# Patient Record
Sex: FEMALE | Race: BLACK OR AFRICAN AMERICAN | Employment: OTHER | URBAN - METROPOLITAN AREA
[De-identification: names, ages, dates, MRNs, and addresses within clinical notes are randomized per-mention and may not be internally consistent; named-entity substitution may affect disease eponyms.]

---

## 2018-05-14 ENCOUNTER — OFFICE VISIT (OUTPATIENT)
Dept: NEUROLOGY | Age: 38
End: 2018-05-14

## 2018-05-14 VITALS
WEIGHT: 236.3 LBS | OXYGEN SATURATION: 97 % | TEMPERATURE: 98.4 F | DIASTOLIC BLOOD PRESSURE: 60 MMHG | HEART RATE: 98 BPM | RESPIRATION RATE: 18 BRPM | BODY MASS INDEX: 39.37 KG/M2 | HEIGHT: 65 IN | SYSTOLIC BLOOD PRESSURE: 104 MMHG

## 2018-05-14 DIAGNOSIS — M54.2 CERVICALGIA: ICD-10-CM

## 2018-05-14 DIAGNOSIS — G43.009 MIGRAINE WITHOUT AURA AND WITHOUT STATUS MIGRAINOSUS, NOT INTRACTABLE: Primary | ICD-10-CM

## 2018-05-14 RX ORDER — TOPIRAMATE 25 MG/1
TABLET ORAL
Qty: 30 TAB | Refills: 6 | Status: SHIPPED | OUTPATIENT
Start: 2018-05-14 | End: 2018-08-31

## 2018-05-14 RX ORDER — METHYLPREDNISOLONE 4 MG/1
TABLET ORAL
Qty: 1 DOSE PACK | Refills: 0 | Status: SHIPPED | OUTPATIENT
Start: 2018-05-14 | End: 2018-05-14 | Stop reason: SDUPTHER

## 2018-05-14 RX ORDER — TOPIRAMATE 25 MG/1
TABLET ORAL
Qty: 30 TAB | Refills: 6 | Status: SHIPPED | OUTPATIENT
Start: 2018-05-14 | End: 2018-05-14 | Stop reason: SDUPTHER

## 2018-05-14 RX ORDER — SUMATRIPTAN 100 MG/1
100 TABLET, FILM COATED ORAL
Qty: 12 TAB | Refills: 6 | Status: SHIPPED | OUTPATIENT
Start: 2018-05-14 | End: 2018-08-31 | Stop reason: SDUPTHER

## 2018-05-14 RX ORDER — METHYLPREDNISOLONE 4 MG/1
TABLET ORAL
Qty: 1 DOSE PACK | Refills: 0 | Status: SHIPPED | OUTPATIENT
Start: 2018-05-14 | End: 2018-05-31

## 2018-05-14 RX ORDER — SUMATRIPTAN 100 MG/1
100 TABLET, FILM COATED ORAL
Qty: 12 TAB | Refills: 6 | Status: SHIPPED | OUTPATIENT
Start: 2018-05-14 | End: 2018-05-14 | Stop reason: SDUPTHER

## 2018-05-14 RX ORDER — CYCLOBENZAPRINE HCL 10 MG
TABLET ORAL
COMMUNITY
End: 2018-08-31

## 2018-05-14 RX ORDER — LISINOPRIL AND HYDROCHLOROTHIAZIDE 10; 12.5 MG/1; MG/1
TABLET ORAL DAILY
COMMUNITY
End: 2019-11-25 | Stop reason: SDUPTHER

## 2018-05-14 RX ORDER — METHOCARBAMOL 500 MG/1
TABLET, FILM COATED ORAL AS NEEDED
COMMUNITY
End: 2018-10-24 | Stop reason: SDUPTHER

## 2018-05-14 NOTE — PROGRESS NOTES
Migraine headaches. Neurology Consult      Subjective:      Rishi Green is a 42 y.o. female who comes in with an interesting pain picture. Says she has had migraines since college. Is I understand it ended up seeing Dr. 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 Apparently there was extensive testing done although I do not have the benefit of that. She ended up having childbirth around the time and got out of the routine follow-up with Dr. Sam Martini. Headaches are always right-sided with a very strong periorbital supraorbital location. Can get a random 3-5 headaches per week and since April has been more intractable than not. There can be nausea and a pressure-like feeling. Interestingly has a right neck strain component and this can be present for several days. Has used Fioricet and recent suggestions to start Topamax given the evolution of recent headaches. Headaches enhance by light noise and smells and diminished by lying down. Family history negative for headaches. Stress includes 3 kids and when out on what sounds like medical leave in recent times with low back pain as well. Is on CPAP for obstructive sleep apnea. Saw a chiropractor who did imaging of her neck and said that there was straightening of her neck and a scoliotic pattern to her lower back? He had some proposals for her in terms of treatment but she is thinking them over. Is very familiar with gabapentin which she has used for low back discomfort and I see Robaxin and Flexeril and hydrocodone in her medical tab. As far as her neck goes I hear nothing today that promotes a radiculopathy agenda. Current Outpatient Prescriptions   Medication Sig Dispense Refill    butalb/acetaminophen/caffeine (FIORICET PO) Take 2 Tabs by mouth every four (4) hours as needed.  methocarbamol (ROBAXIN) 500 mg tablet Take  by mouth four (4) times daily.       lisinopril-hydroCHLOROthiazide (Coye Louis) 10-12.5 mg per tablet Take  by mouth daily.  cyclobenzaprine (FLEXERIL) 10 mg tablet Take  by mouth three (3) times daily as needed for Muscle Spasm(s).  levonorgestrel (MIRENA) 20 mcg/24 hr (5 years) IUD 1 Device by IntraUTERine route once.  methylPREDNISolone (MEDROL DOSEPACK) 4 mg tablet Per package instructions. .. . 1 Dose Pack 0    SUMAtriptan (IMITREX) 100 mg tablet Take 1 Tab by mouth once as needed for Migraine for up to 1 dose. Indications: Migraine 12 Tab 6    topiramate (TOPAMAX) 25 mg tablet 1 po qd  Indications: MIGRAINE PREVENTION 30 Tab 6    HYDROcodone-acetaminophen (NORCO) 5-325 mg per tablet Take 2 Tabs by mouth every six (6) hours as needed. 30 Tab 0      No Known Allergies  Past Medical History:   Diagnosis Date    Anxiety     Essential hypertension     chronic pt takes labetolol    Headache     Musculoskeletal disorder     Scoliosis     Snoring       Past Surgical History:   Procedure Laterality Date     DELIVERY ONLY      HX CARPAL TUNNEL RELEASE      HX GYN      HX ORTHOPAEDIC      HX OTHER SURGICAL      left knee surgery 2007    NEUROLOGICAL PROCEDURE UNLISTED        Social History     Social History    Marital status:      Spouse name: N/A    Number of children: N/A    Years of education: N/A     Occupational History    Not on file.      Social History Main Topics    Smoking status: Never Smoker    Smokeless tobacco: Never Used    Alcohol use Yes      Comment: wine    Drug use: No    Sexual activity: Yes     Partners: Male     Other Topics Concern    Not on file     Social History Narrative      Family History   Problem Relation Age of Onset    Diabetes Mother     Hypertension Mother     Cancer Mother     Neuropathy Mother     Hypertension Father     Cancer Father     Cancer Maternal Grandfather     Stroke Maternal Grandfather     Diabetes Paternal Grandmother     Stroke Paternal Grandmother     Heart Disease Paternal Grandmother     Heart Disease Paternal Uncle       Visit Vitals    /60  Comment: supine    Pulse 98    Temp 98.4 °F (36.9 °C) (Oral)    Resp 18    Ht 5' 5\" (1.651 m)    Wt 107.2 kg (236 lb 4.8 oz)    LMP  (LMP Unknown)    SpO2 97%    Breastfeeding No    BMI 39.32 kg/m2        Review of Systems:   A comprehensive review of systems was negative except for that written in the HPI. Neuro Exam:     Appearance: The patient is well developed, well nourished, provides a coherent history and is in no acute distress. Mental Status: Oriented to time, place and person. Mood and affect appropriate. Cranial Nerves:   Intact visual fields. Fundi are benign. JOEY, EOM's full, no nystagmus, no ptosis. Facial sensation is normal. Corneal reflexes are intact. Facial movement is symmetric. Hearing is normal bilaterally. Palate is midline with normal sternocleidomastoid and trapezius muscles are normal. Tongue is midline. Motor:  5/5 strength in upper and lower proximal and distal muscles. Normal bulk and tone. No fasciculations. Reflexes:   Deep tendon reflexes 2+/4 and symmetrical.   Sensory:   Normal to touch, pinprick and vibration. Gait:  Normal gait. Tremor:   No tremor noted. Cerebellar:  No cerebellar signs present. Neurovascular:  Normal heart sounds and regular rhythm, peripheral pulses intact, and no carotid bruits. Neck range of motion actually looks incredibly decent but is slow to accomplish. Assessment:   Migraine headaches. Would like to intervene with Medrol Dosepak as an acute rescue attempt and beyond that Imitrex as a regular go to medicine which could be used with over-the-counter remedies if and as needed for a better combination effect. Topamax will be a preventative drug 25 mg nightly and needs to be patient as it builds up in her system. She will have to decide if she is going to follow the chiropractors advice as to her neck symptomatology.   Gabapentin may prove helpful with the neck pain component as well as the Medrol Dosepak. Revisit 2 months. Plan:   Revisit 2 months.   Signed by :  Victorina Vasques MD

## 2018-05-14 NOTE — MR AVS SNAPSHOT
[facility] 
 
 
 [facility] [address] [place] [address] [phone] Patient: Carmen Varela MRN: [id number]:[id number] Visit Information Date & Time Provider Department Dept. Phone Encounter #  
 5/14/2018  2:00 PM Ab Joshua MD [facility] Neurology [facility] [phone] [id number] Follow-up Instructions Return in about 2 months (around 7/14/2018). Upcoming Health Maintenance Date Due DTaP/Tdap/Td series (1 - Tdap) 5/16/1999 PAP AKA CERVICAL CYTOLOGY 5/16/1999 Influenza Age 5 to Adult 8/1/2018 Allergies as of 5/14/2018  Review Complete On: 5/14/2018 By: Ab Joshua MD  
 No Known Allergies Current Immunizations  Never Reviewed No immunizations on file. Not reviewed this visit You Were Diagnosed With   
  
 Codes Comments Migraine without aura and without status migrainosus, not intractable    -  Primary ICD-10-CM: G43.009 ICD-9-CM: 346.10 Cervicalgia     ICD-10-CM: M54.2 ICD-9-CM: 723.1 Vitals BP Pulse Temp Resp Height(growth percentile) Weight(growth percentile) 104/60 98 98.4 °F (36.9 °C) (Oral) 18 5' 5\" (1.651 m) 236 lb 4.8 oz (107.2 kg) LMP SpO2 Breastfeeding? BMI OB Status Smoking Status (LMP Unknown) 97% No 39.32 kg/m2 IUD Never Smoker Vitals History BMI and BSA Data Body Mass Index Body Surface Area  
 39.32 kg/m 2 2.22 m 2 Your Updated Medication List  
  
   
This list is accurate as of 5/14/18  2:57 PM.  Always use your most recent med list.  
  
  
  
  
 cyclobenzaprine 10 mg tablet Commonly known as:  FLEXERIL Take  by mouth three (3) times daily as needed for Muscle Spasm(s). FIORICET PO Take 2 Tabs by mouth every four (4) hours as needed. HYDROcodone-acetaminophen 5-325 mg per tablet Commonly known as:  Fredrica Hassan Take 2 Tabs by mouth every six (6) hours as needed. lisinopril-hydroCHLOROthiazide 10-12.5 mg per tablet Commonly known as:  Melissa Dun Take  by mouth daily. methylPREDNISolone 4 mg tablet Commonly known as:  Etka Pimple Per package instructions. ... MIRENA 20 mcg/24 hr (5 years) Iud  
Generic drug:  levonorgestrel 1 Device by IntraUTERine route once. ROBAXIN 500 mg tablet Generic drug:  methocarbamol Take  by mouth four (4) times daily. SUMAtriptan 100 mg tablet Commonly known as:  IMITREX Take 1 Tab by mouth once as needed for Migraine for up to 1 dose. Indications: Migraine  
  
 topiramate 25 mg tablet Commonly known as:  TOPAMAX 1 po qd  Indications: MIGRAINE PREVENTION Prescriptions Printed Refills  
 methylPREDNISolone (MEDROL DOSEPACK) 4 mg tablet 0 Sig: Per package instructions. ... Class: Print SUMAtriptan (IMITREX) 100 mg tablet 6 Sig: Take 1 Tab by mouth once as needed for Migraine for up to 1 dose. Indications: Migraine Class: Print Route: Oral  
 topiramate (TOPAMAX) 25 mg tablet 6 Si po qd  Indications: MIGRAINE PREVENTION Class: Print Follow-up Instructions Return in about 2 months (around 2018). Patient Instructions Information Regarding Testing and Medications If you have physican order for a test or a medication denied by your insurance company, this does not mean the test or medication is not appropriate for you as that is a medical decision, not a decision to be made by an insurance company representative or by an [facility] physician who has not interviewed and examined you. This is a decision to be made between you and your physician. The denial of services is a contractual matter between you and your insurance company, not an issue between your physician and the insurance company. If your test or medication is denied, you can take the following steps to help resolve the issue: 1.  File a complaint with the [facility] of [facility] regarding your insurance company's denial of services ordered for you. You can do this either by calling them directly or by completing an on-line complaint form on the makerist. This can be found at [email or web address] 2. Also file a formal complaint with your insurance company and ask to have the name of the person denying the service so that you may explore a legal option should you be harmed by this denial of service. Again, the fact the insurance company will not pay for the service does not mean it is not medically necessary and I would encourage you to follow through with the plan that was made with your physician 3. File a written complaint with your employer so your employer and benefit manager is aware of the poor coverage they are providing their employees. If you have medicare/medicaid, complain to your representative in the House and to your Alida Stokes. [place] [address] What is a living will? A living will is a legal form you use to write down the kind of care you want at the end of your life. It is used by the health professionals who will treat you if you aren't able to decide for yourself. If you put your wishes in writing, your loved ones and others will know what kind of care you want. They won't need to guess. This can ease your mind and be helpful to others. A living will is not the same as an estate or property will. An estate will explains what you want to happen with your money and property after you die. Is a living will a legal document? A living will is a legal document. Each state has its own laws about living urrutia. If you move to another state, make sure that your living will is legal in the state where you now live. Or you might use a universal form that has been approved by many states.  This kind of form can sometimes be completed and stored online. Your electronic copy will then be available wherever you have a connection to the Internet. In most cases, doctors will respect your wishes even if you have a form from a different state. · You don't need an  to complete a living will. But legal advice can be helpful if your state's laws are unclear, your health history is complicated, or your family can't agree on what should be in your living will. · You can change your living will at any time. Some people find that their wishes about end-of-life care change as their health changes. · In addition to making a living will, think about completing a medical power of  form. This form lets you name the person you want to make end-of-life treatment decisions for you (your \"health care agent\") if you're not able to. Many hospitals and nursing homes will give you the forms you need to complete a living will and a medical power of . · Your living will is used only if you can't make or communicate decisions for yourself anymore. If you become able to make decisions again, you can accept or refuse any treatment, no matter what you wrote in your living will. · Your state may offer an online registry. This is a place where you can store your living will online so the doctors and nurses who need to treat you can find it right away. What should you think about when creating a living will? Talk about your end-of-life wishes with your family members and your doctor. Let them know what you want. That way the people making decisions for you won't be surprised by your choices. Think about these questions as you make your living will: · Do you know enough about life support methods that might be used? If not, talk to your doctor so you know what might be done if you can't breathe on your own, your heart stops, or you're unable to swallow.  
· What things would you still want to be able to do after you receive life-support methods? Would you want to be able to walk? To speak? To eat on your own? To live without the help of machines? · If you have a choice, where do you want to be cared for? In your home? At a hospital or nursing home? · Do you want certain Nondenominational practices performed if you become very ill? · If you have a choice at the end of your life, where would you prefer to die? At home? In a hospital or nursing home? Somewhere else? · Would you prefer to be buried or cremated? · Do you want your organs to be donated after you die? What should you do with your living will? · Make sure that your family members and your health care agent have copies of your living will. · Give your doctor a copy of your living will to keep in your medical record. If you have more than one doctor, make sure that each one has a copy. · You may want to put a copy of your living will where it can be easily found. Where can you learn more? Go to http://sandhya-leroy.info/. Enter X976 in the search box to learn more about \"Learning About Living Ledy Kingsley. \" Current as of: September 24, 2016 Content Version: 11.4 © 2440-7614 [employer]. Care instructions adapted under license by [employer] (which disclaims liability or warranty for this information). If you have questions about a medical condition or this instruction, always ask your healthcare professional. [place] [address] any warranty or liability for your use of this information. Patient history reviewed and patient examined. Would like to start Topamax as a migraine preventative agent and would like to insert Medrol Dosepak as a rescue for inflammation of head and neck. Imitrex will be a more standard rescue and she will need to decide on the plan of action with the chiropractor. Gabapentin at home could will help the neck pain component as well as headaches. Introducing [facility]! Jose Alejandro Avelar introduces Events Core patient portal. Now you can access parts of your medical record, email your doctor's office, and request medication refills online. 1. In your internet browser, go to [email or web address] 2. Click on the First Time User? Click Here link in the Sign In box. You will see the New Member Sign Up page. 3. Enter your Events Core Access Code exactly as it appears below. You will not need to use this code after youve completed the sign-up process. If you do not sign up before the expiration date, you must request a new code. · Events Core Access Code: [email or web address] Expires: 8/12/2018  2:57 PM 
 
4. Enter the last four digits of your Social Security Number (xxxx) and Date of Birth (mm/dd/yyyy) as indicated and click Submit. You will be taken to the next sign-up page. 5. Create a Events Core ID. This will be your Events Core login ID and cannot be changed, so think of one that is secure and easy to remember. 6. Create a Events Core password. You can change your password at any time. 7. Enter your Password Reset Question and Answer. This can be used at a later time if you forget your password. 8. Enter your e-mail address. You will receive e-mail notification when new information is available in [address] 9. Click Sign Up. You can now view and download portions of your medical record. 10. Click the Download Summary menu link to download a portable copy of your medical information. If you have questions, please visit the Frequently Asked Questions section of the Events Core website. Remember, Events Core is NOT to be used for urgent needs. For medical emergencies, dial 911. Now available from your iPhone and Android! Please provide this summary of care documentation to your next provider. Your primary care clinician is listed as Any Vo. If you have any questions after today's visit, please call [phone].

## 2018-05-14 NOTE — PATIENT INSTRUCTIONS
Information Regarding Testing and Medications    If you have physican order for a test or a medication denied by your insurance company, this does not mean the test or medication is not appropriate for you as that is a medical decision, not a decision to be made by an insurance company representative or by an [facility] physician who has not interviewed and examined you. This is a decision to be made between you and your physician. The denial of services is a contractual matter between you and your insurance company, not an issue between your physician and the insurance company. If your test or medication is denied, you can take the following steps to help resolve the issue:    1. File a complaint with the [facility] regarding your insurance company's denial of services ordered for you. You can do this either by calling them directly or by completing an on-line complaint form on the Trifacta. This can be found at [email or web address]    2. Also file a formal complaint with your insurance company and ask to have the name of the person denying the service so that you may explore a legal option should you be harmed by this denial of service. Again, the fact the insurance company will not pay for the service does not mean it is not medically necessary and I would encourage you to follow through with the plan that was made with your physician    3. File a written complaint with your employer so your employer and benefit manager is aware of the poor coverage they are providing their employees. If you have medicare/medicaid, complain to your representative in the House and to your Alida Stokes. Learning About Living Jenny Rudolph  What is a living will? A living will is a legal form you use to write down the kind of care you want at the end of your life.  It is used by the health professionals who will treat you if you aren't able to decide for yourself. If you put your wishes in writing, your loved ones and others will know what kind of care you want. They won't need to guess. This can ease your mind and be helpful to others. A living will is not the same as an estate or property will. An estate will explains what you want to happen with your money and property after you die. Is a living will a legal document? A living will is a legal document. Each state has its own laws about living urrutia. If you move to another state, make sure that your living will is legal in the state where you now live. Or you might use a universal form that has been approved by many states. This kind of form can sometimes be completed and stored online. Your electronic copy will then be available wherever you have a connection to the Internet. In most cases, doctors will respect your wishes even if you have a form from a different state. · You don't need an  to complete a living will. But legal advice can be helpful if your state's laws are unclear, your health history is complicated, or your family can't agree on what should be in your living will. · You can change your living will at any time. Some people find that their wishes about end-of-life care change as their health changes. · In addition to making a living will, think about completing a medical power of  form. This form lets you name the person you want to make end-of-life treatment decisions for you (your \"health care agent\") if you're not able to. Many hospitals and nursing homes will give you the forms you need to complete a living will and a medical power of . · Your living will is used only if you can't make or communicate decisions for yourself anymore. If you become able to make decisions again, you can accept or refuse any treatment, no matter what you wrote in your living will. · Your state may offer an online registry.  This is a place where you can store your living will online so the doctors and nurses who need to treat you can find it right away. What should you think about when creating a living will? Talk about your end-of-life wishes with your family members and your doctor. Let them know what you want. That way the people making decisions for you won't be surprised by your choices. Think about these questions as you make your living will:  · Do you know enough about life support methods that might be used? If not, talk to your doctor so you know what might be done if you can't breathe on your own, your heart stops, or you're unable to swallow. · What things would you still want to be able to do after you receive life-support methods? Would you want to be able to walk? To speak? To eat on your own? To live without the help of machines? · If you have a choice, where do you want to be cared for? In your home? At a hospital or nursing home? · Do you want certain Mandaen practices performed if you become very ill? · If you have a choice at the end of your life, where would you prefer to die? At home? In a hospital or nursing home? Somewhere else? · Would you prefer to be buried or cremated? · Do you want your organs to be donated after you die? What should you do with your living will? · Make sure that your family members and your health care agent have copies of your living will. · Give your doctor a copy of your living will to keep in your medical record. If you have more than one doctor, make sure that each one has a copy. · You may want to put a copy of your living will where it can be easily found. Where can you learn more? Go to http://sandhya-leroy.info/. Enter W652 in the search box to learn more about \"Learning About Living Perroy. \"  Current as of: September 24, 2016  Content Version: 11.4  © 6305-5230 Healthwise, Incorporated.  Care instructions adapted under license by [employer] (which disclaims liability or warranty for this information). If you have questions about a medical condition or this instruction, always ask your healthcare professional. [address]  any warranty or liability for your use of this information. Patient history reviewed and patient examined. Would like to start Topamax as a migraine preventative agent and would like to insert Medrol Dosepak as a rescue for inflammation of head and neck. Imitrex will be a more standard rescue and she will need to decide on the plan of action with the chiropractor. Gabapentin at home could will help the neck pain component as well as headaches.

## 2018-05-29 ENCOUNTER — TELEPHONE (OUTPATIENT)
Dept: NEUROLOGY | Age: 38
End: 2018-05-29

## 2018-05-29 NOTE — TELEPHONE ENCOUNTER
----- Message from Zackery Belcher sent at 5/29/2018 10:43 AM EDT -----  Regarding: Dr. Wilner White Pt requested a call from the nurse regarding chronic pain in lower back and would like to know  the diagnostic process for MS or any other movement disorders. Best contact number [phone].

## 2018-05-31 ENCOUNTER — OFFICE VISIT (OUTPATIENT)
Dept: NEUROLOGY | Age: 38
End: 2018-05-31

## 2018-05-31 ENCOUNTER — TELEPHONE (OUTPATIENT)
Dept: NEUROLOGY | Age: 38
End: 2018-05-31

## 2018-05-31 VITALS
DIASTOLIC BLOOD PRESSURE: 84 MMHG | HEIGHT: 65 IN | WEIGHT: 236 LBS | BODY MASS INDEX: 39.32 KG/M2 | SYSTOLIC BLOOD PRESSURE: 122 MMHG

## 2018-05-31 DIAGNOSIS — M79.605 PAIN IN BOTH LOWER EXTREMITIES: ICD-10-CM

## 2018-05-31 DIAGNOSIS — M54.41 CHRONIC BILATERAL LOW BACK PAIN WITH BILATERAL SCIATICA: ICD-10-CM

## 2018-05-31 DIAGNOSIS — G89.29 CHRONIC BILATERAL LOW BACK PAIN WITH BILATERAL SCIATICA: ICD-10-CM

## 2018-05-31 DIAGNOSIS — M54.42 CHRONIC BILATERAL LOW BACK PAIN WITH BILATERAL SCIATICA: ICD-10-CM

## 2018-05-31 DIAGNOSIS — M79.604 PAIN IN BOTH LOWER EXTREMITIES: ICD-10-CM

## 2018-05-31 DIAGNOSIS — G43.909 MIGRAINE WITHOUT STATUS MIGRAINOSUS, NOT INTRACTABLE, UNSPECIFIED MIGRAINE TYPE: Primary | ICD-10-CM

## 2018-05-31 PROBLEM — E66.01 SEVERE OBESITY (BMI 35.0-39.9): Status: ACTIVE | Noted: 2018-05-31

## 2018-05-31 NOTE — TELEPHONE ENCOUNTER
----- Message from Domitila Leung sent at 5/31/2018  2:57 PM EDT -----  Regarding: NP Marycruz/Telephone  Pt requesting to speak with the nurse in regards to medication \"Gabapentin\" 300 MG. Pt inquiring on how many times should she be taking medication a day. Pt stated, she knows medication is to be taken as needed for pain inquiring on the maximin amount she could take per day. Best contact number [phone].

## 2018-05-31 NOTE — PATIENT INSTRUCTIONS
[facility]   Statement to Patients  April 1, 2014      In an effort to ensure the large volume of patient prescription refills is processed in the most efficient and expeditious manner, we are asking our patients to assist us by calling your Pharmacy for all prescription refills, this will include also your  Mail Order Pharmacy. The pharmacy will contact our office electronically to continue the refill process. Please do not wait until the last minute to call your pharmacy. We need at least 48 hours (2days) to fill prescriptions. We also encourage you to call your pharmacy before going to  your prescription to make sure it is ready. With regard to controlled substance prescription refill requests (narcotic refills) that need to be picked up at our office, we ask your cooperation by providing us with at least 72 hours (3days) notice that you will need a refill. We will not refill narcotic prescription refill requests after 4:00pm on any weekday, Monday through Thursday, or after 2:00pm on Fridays, or on the weekends. We encourage everyone to explore another way of getting your prescription refill request processed using [employer], our patient web portal through our electronic medical record system. [employer] is an efficient and effective way to communicate your medication request directly to the office and  downloadable as an sigrid on your smart phone . [employer] also features a review functionality that allows you to view your medication list as well as leave messages for your physician. Are you ready to get connected? If so please review the attatched instructions or speak to any of our staff to get you set up right away! Thank you so much for your cooperation. Should you have any questions please contact our Practice Administrator.     The Physicians and Staff,  [facility]

## 2018-05-31 NOTE — MR AVS SNAPSHOT
[id number] [facility] [address] 
[phone] Patient: Kwame Lucas MRN: [id number] Visit Information Date & Time Provider Department Dept. Phone Encounter #  
 5/31/2018  9:00 AM Michael Chapman  [facility] [phone] [id number] Follow-up Instructions Return after testing. Your Appointments 6/6/2018  9:00 AM  
PROCEDURE with EMG SCHEDULE [facility] Appt Note: EMG bilateral lower extr leathw [place] [address] [facility] [address] [facility] 99 [id number] [phone]  
  
   
 [place] [address]  
  
    
 6/21/2018 10:40 AM  
Follow Up with Jose Spencer MD  
[address] [facility] Appt Note: EMG, MRI results leathw  
 [address] [facility] [id number] [place] [address]  
[facility] [address] [place] [address] Upcoming Health Maintenance Date Due DTaP/Tdap/Td series (1 - Tdap) 5/16/1999 PAP AKA CERVICAL CYTOLOGY 5/16/1999 Influenza Age 5 to Adult 8/1/2018 Allergies as of 5/31/2018  Review Complete On: 5/31/2018 By: Albaro Cerda No Known Allergies Current Immunizations  Never Reviewed No immunizations on file. Not reviewed this visit You Were Diagnosed With   
  
 Codes Comments Migraine without status migrainosus, not intractable, unspecified migraine type    -  Primary ICD-10-CM: O02.380 ICD-9-CM: 346.90 Chronic bilateral low back pain with bilateral sciatica     ICD-10-CM: M54.42, M54.41, G89.29 ICD-9-CM: 724.2, 724.3, 338.29 Pain in both lower extremities     ICD-10-CM: M79.604, M79.605 ICD-9-CM: 729.5 Vitals BP Height(growth percentile) Weight(growth percentile) LMP BMI OB Status 122/84 5' 5\" (1.651 m) 236 lb (107 kg) (LMP Unknown) 39.27 kg/m2 IUD Smoking Status Never Smoker Vitals History BMI and BSA Data Body Mass Index Body Surface Area  
 39.27 kg/m 2 2.22 m 2 Preferred Pharmacy Pharmacy Name Phone [facility] [place], [address] [place] [address] Your Updated Medication List  
  
   
This list is accurate as of 5/31/18 10:16 AM.  Always use your most recent med list.  
  
  
  
  
 cyclobenzaprine 10 mg tablet Commonly known as:  FLEXERIL Take  by mouth three (3) times daily as needed for Muscle Spasm(s). FIORICET PO Take 2 Tabs by mouth every four (4) hours as needed. HYDROcodone-acetaminophen 5-325 mg per tablet Commonly known as:  [place] Take 2 Tabs by mouth every six (6) hours as needed. lisinopril-hydroCHLOROthiazide 10-12.5 mg per tablet Commonly known as:  Pamula Colla Take  by mouth daily. MIRENA 20 mcg/24 hr (5 years) Iud  
Generic drug:  levonorgestrel 1 Device by IntraUTERine route once. ROBAXIN 500 mg tablet Generic drug:  methocarbamol Take  by mouth four (4) times daily. SUMAtriptan 100 mg tablet Commonly known as:  IMITREX Take 1 Tab by mouth once as needed for Migraine for up to 1 dose. Indications: Migraine  
  
 topiramate 25 mg tablet Commonly known as:  TOPAMAX 1 po qd  Indications: MIGRAINE PREVENTION Follow-up Instructions Return after testing. To-Do List   
 05/31/2018 Neurology:  EMG LIMITED   
  
 05/31/2018 Imaging:  MRI BRAIN WO CONT Referral Information Referral ID Referred By Referred To  
  
 [id number] Johnna Ambriz [facility] Not Available Visits Status Start Date End Date 1 New Request 5/31/18 5/31/19  If your referral has a status of pending review or denied, additional information will be sent to support the outcome of this decision. Patient Instructions PRESCRIPTION REFILL POLICY [facility] Statement to Patients April 1, 2014 In an effort to ensure the large volume of patient prescription refills is processed in the most efficient and expeditious manner, we are asking our patients to assist us by calling your Pharmacy for all prescription refills, this will include also your  Mail Order Pharmacy. The pharmacy will contact our office electronically to continue the refill process. Please do not wait until the last minute to call your pharmacy. We need at least 48 hours (2days) to fill prescriptions. We also encourage you to call your pharmacy before going to  your prescription to make sure it is ready. With regard to controlled substance prescription refill requests (narcotic refills) that need to be picked up at our office, we ask your cooperation by providing us with at least 72 hours (3days) notice that you will need a refill. We will not refill narcotic prescription refill requests after 4:00pm on any weekday, Monday through Thursday, or after 2:00pm on Fridays, or on the weekends. We encourage everyone to explore another way of getting your prescription refill request processed using [employer], our patient web portal through our electronic medical record system. [employer] is an efficient and effective way to communicate your medication request directly to the office and  downloadable as an sigrid on your smart phone . [employer] also features a review functionality that allows you to view your medication list as well as leave messages for your physician. Are you ready to get connected? If so please review the attatched instructions or speak to any of our staff to get you set up right away! Thank you so much for your cooperation. Should you have any questions please contact our Practice Administrator. The Physicians and Staff,  [facility] Introducing \[facility] [place] [facility]\"" & HEALTH SERVICES! [facility] introduces [employer] patient portal. Now you can access parts of your medical record, email your doctor's office, and request medication refills online. 1. In your internet browser, go to [email or web address] 2. Click on the First Time User? Click Here link in the Sign In box. You will see the New Member Sign Up page. 3. Enter your Con[employer] Access Code exactly as it appears below. You will not need to use this code after youve completed the sign-up process. If you do not sign up before the expiration date, you must request a new code. · [employer] Access Code: [email or web address] Expires: 8/12/2018  2:57 PM 
 
4. Enter the last four digits of your Social Security Number (xxxx) and Date of Birth (mm/dd/yyyy) as indicated and click Submit. You will be taken to the next sign-up page. 5. Create a Cono-C ID. This will be your Con[employer] login ID and cannot be changed, so think of one that is secure and easy to remember. 6. Create a [employer] password. You can change your password at any time. 7. Enter your Password Reset Question and Answer. This can be used at a later time if you forget your password. 8. Enter your e-mail address. You will receive e-mail notification when new information is available in [address] 9. Click Sign Up. You can now view and download portions of your medical record. 10. Click the Download Summary menu link to download a portable copy of your medical information. If you have questions, please visit the Frequently Asked Questions section of the [employer] website. Remember, [employer] is NOT to be used for urgent needs. For medical emergencies, dial 911. Now available from your iPhone and Android! Please provide this summary of care documentation to your next provider. Your primary care clinician is listed as Pernell Ashley.  If you have any questions after today's visit, please call [phone].

## 2018-05-31 NOTE — TELEPHONE ENCOUNTER
Patient informed and states understanding.      Per NP:      Can take up to 300 mg TID     She can start with 300 mg twice a day and if not working cna add a lunch time dose  (Routing comment)

## 2018-05-31 NOTE — PROGRESS NOTES
Patient is here with complaints of back pain and pelvic pain that started 5 years ago. Patient took hydrocodone when she got here; before taking the pain was a 9/10 on pain scale. She sees Dr. Harish Boateng at [facility] and PCP for the back pain. She has been to [facility] pain specialists and had injections with that office. She had steroid injections with Dr. Rosibel Donaldson. She has also had physical therapy with [facility] physical therapy,Sammie Hillman(dry needling, manual manipulation, pelvic floor exercises) and is currently in PT with [facility]. She has tried water therapy through [facility]. She was told she hit a plateau with [facility] PT and that is why she switched to [facility]. Patient [facility] did xrays in 2015 and 2017 from [facility]. She was told she has a dislocated tail bone in both xray's which is why she had steroid injections. S1 injections did help the pain, but the other injections did not. Medial blocks were done by Jean Christy. Patient has appt with them June 25th to figure out next steps. Manual manipulation provided 30-40 minutes of relief. She has not seem very much improvement at all over the past 3 years. Pain progressively got worse after she had her daughter in 1. Patient was on Short Term Disability in 2015, 2017, and starting again April 2018. Patient would like to rule out MS and Pudendal Neuralgia.

## 2018-05-31 NOTE — PROGRESS NOTES
Rosalia Chinchilla is a 34 y.o. female who presents with the following  Chief Complaint   Patient presents with    Back Pain       HPI Patient comes in for a new complaint of low back pain, leg pain, falls, pelvic pain, instability. She has a extensive history of back issues that since April 9th have gotten a lot worse. She states she has been seeing Dr. Mal Velazco at [facility] and seen [facility] pain specialists and had injections that did not help. She has also had PT multiple occasions, dry needling, manual manipulation and pelvic floor exercises with no real relief. Currently doing PT and water therapy through [facility]. 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 Will attach reports to media file as brought in by patient. Pain and overall functioning has gotten worse recently with today coming into the office a 9/10 pain scale even after hydrocodone through her PCP. She was on Gabapentin 100 mg TID at one point and noticed no changes in pain relief. She is here today in a wheelchair and can not stand, sit, walk, or lie down for extended periods of time due to discomfort, pain, and radiation from lumbar spine into the legs, mostly on the lateral side of her hips to the backs of her legs and down the leg. She has pain, numbness, tingling, gait instability, balance trouble, falls. She feels pins and needles deep in her buttocks, legs, thighs. She has a history of a broken tailbone also. 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 Her migraines are actually a little better with the Topamax but still very painful and intense where she will get nausea, vomiting, dizziness, light and sound sensitivity. She has to wear her sunglasses anywhere she does due to visual disturbance and sensitivity making things worse.  Never has had a MRI brain. No Known Allergies    Current Outpatient Prescriptions   Medication Sig    butalb/acetaminophen/caffeine (FIORICET PO) Take 2 Tabs by mouth every four (4) hours as needed.  methocarbamol (ROBAXIN) 500 mg tablet Take  by mouth four (4) times daily.  lisinopril-hydroCHLOROthiazide (PRINZIDE, ZESTORETIC) 10-12.5 mg per tablet Take  by mouth daily.  levonorgestrel (MIRENA) 20 mcg/24 hr (5 years) IUD 1 Device by IntraUTERine route once.  SUMAtriptan (IMITREX) 100 mg tablet Take 1 Tab by mouth once as needed for Migraine for up to 1 dose. Indications: Migraine    topiramate (TOPAMAX) 25 mg tablet 1 po qd  Indications: MIGRAINE PREVENTION    HYDROcodone-acetaminophen (NORCO) 5-325 mg per tablet Take 2 Tabs by mouth every six (6) hours as needed.  cyclobenzaprine (FLEXERIL) 10 mg tablet Take  by mouth three (3) times daily as needed for Muscle Spasm(s). No current facility-administered medications for this visit.         History   Smoking Status    Never Smoker   Smokeless Tobacco    Never Used       Past Medical History:   Diagnosis Date    Anxiety     Essential hypertension     chronic pt takes labetolol    Headache     Musculoskeletal disorder     Scoliosis     Snoring        Past Surgical History:   Procedure Laterality Date     DELIVERY ONLY      HX CARPAL TUNNEL RELEASE      HX GYN      HX ORTHOPAEDIC      HX OTHER SURGICAL      left knee surgery 2007    NEUROLOGICAL PROCEDURE UNLISTED         Family History   Problem Relation Age of Onset    Diabetes Mother     Hypertension Mother     Cancer Mother     Neuropathy Mother     Hypertension Father     Cancer Father     Cancer Maternal Grandfather     Stroke Maternal Grandfather     Diabetes Paternal Grandmother     Stroke Paternal Grandmother     Heart Disease Paternal Grandmother     Heart Disease Paternal Uncle        Social History     Social History    Marital status:      Spouse name: N/A    Number of children: N/A    Years of education: N/A     Social History Main Topics    Smoking status: Never Smoker    Smokeless tobacco: Never Used    Alcohol use Yes      Comment: wine    Drug use: No    Sexual activity: Yes     Partners: Male     Other Topics Concern    None     Social History Narrative       Review of Systems   Constitutional: Positive for malaise/fatigue. Eyes: Positive for blurred vision, double vision and photophobia. Respiratory: Negative for shortness of breath and wheezing. Gastrointestinal: Positive for nausea and vomiting. Musculoskeletal: Positive for back pain and falls. Negative for neck pain. Neurological: Positive for dizziness, tingling, sensory change, weakness and headaches. Remainder of comprehensive review is negative. Physical Exam :    Visit Vitals    /84    Ht 5' 5\" (1.651 m)    Wt 107 kg (236 lb)    LMP  (LMP Unknown)    BMI 39.27 kg/m2               Results for orders placed or performed during the hospital encounter of 03/27/13   GYN RAPID GP B STREP   Result Value Ref Range    GrBStrep, External negative    RPR   Result Value Ref Range    RPR, External nonreactive    HEP B SURFACE AG   Result Value Ref Range    HBsAg, External negative    HIV-1 AB WESTERN BLOT CONFIRM ONLY   Result Value Ref Range    HIV, External negative    RUBELLA AB, IGM   Result Value Ref Range    Rubella, External immune    CBC WITH AUTOMATED DIFF   Result Value Ref Range    WBC 7.7 3.6 - 11.0 K/uL    RBC 3.52 (L) 3.80 - 5.20 M/uL    HGB 11.2 (L) 11.5 - 16.0 g/dL    HCT 32.7 (L) 35.0 - 47.0 %    MCV 92.9 80.0 - 99.0 FL    MCH 31.8 26.0 - 34.0 PG    MCHC 34.3 30.0 - 36.5 g/dL    RDW 14.8 (H) 11.5 - 14.5 %    PLATELET 699 479 - 856 K/uL    NEUTROPHILS 67 32 - 75 %    LYMPHOCYTES 24 12 - 49 %    MONOCYTES 8 5 - 13 %    EOSINOPHILS 1 0 - 7 %    BASOPHILS 0 0 - 1 %    ABS. NEUTROPHILS 5.2 1.8 - 8.0 K/UL    ABS. LYMPHOCYTES 1.8 0.8 - 3.5 K/UL    ABS. MONOCYTES 0.6 0.0 - 1.0 K/UL    ABS. EOSINOPHILS 0.1 0.0 - 0.4 K/UL    ABS. BASOPHILS 0.0 0.0 - 0.1 K/UL   TYPE, ABO & RH   Result Value Ref Range    ABO,Rh O positive        Orders Placed This Encounter    MRI BRAIN WO CONT     Standing Status:   Future     Standing Expiration Date:   6/30/2019     Order Specific Question:   Is Patient Allergic to Contrast Dye? Answer:   No     Order Specific Question:   Is Patient Pregnant? Answer:   No    EMG LIMITED     Both legs     Standing Status:   Future     Standing Expiration Date:   11/30/2018     Order Specific Question:   Reason for Exam:     Answer:   BLE       1. Migraine without status migrainosus, not intractable, unspecified migraine type    2. Chronic bilateral low back pain with bilateral sciatica    3. Pain in both lower extremities        Follow-up Disposition:  Return after testing. Migraines. Never had had a head MRI and had migraines for years without explanation. We will need to get an MRI brain to rule out stroke, lesions indicative of MS, tumors, posterior circulation. We will also want to get an EMG TO look at her legs looking at neuropathy vs radiculopathy of lumbar spine vs. Other causes of her extensive symptoms causing her so much trouble on a day to day basis. May need MRI lumbar spine as previous MRI showed dessication with impingement on nerve routes most likely surgically needing fixation but we will hold for now. We can try some higher Gabapentin dosing 300 mg BID PRN for breakthrough pain relief. We discussed her POC and she will follow with Dr. Janet Ferrara after testing.          This note will not be viewable in [employer]

## 2018-06-06 ENCOUNTER — OFFICE VISIT (OUTPATIENT)
Dept: NEUROLOGY | Age: 38
End: 2018-06-06

## 2018-06-06 DIAGNOSIS — M54.17 LUMBOSACRAL RADICULOPATHY: Primary | ICD-10-CM

## 2018-06-06 NOTE — PROGRESS NOTES
This was an elective EMG nerve conduction of both lower extremities and associated paraspinals. Patient history. Patient with persistent history of low back pain and followed by [facility] and [facility] pain specialists. This is manifest on both sides of the lower back and is posture and activity responsive. Has had physical therapy as well. Has had previous dry needling which may have helped at one-point? Currently on gabapentin lower dose which helps by degrees but has a low threshold for sedation. Has seen orthopedics as well and I cannot say I understand all the encounters or the evaluations/testing or conclusions drawn. Has had previous imaging of her low back via MRI which again I do not know the exact details. No recent trauma rash chills or fever. No background history of diabetes or known connective tissue diagnosis . Patient exam.  On my encounter on first visit she was alert cooperative fluent articulate upbeat behaviorally appropriate and following three-step commands. Cranial nerves II through XII normal.  Cerebellar testing finger-nose finger toe to finger intact. Motor 5/5 without exception sensibility intact to touch temperature and vibratory. Reflexes approaching +2 gait and transfers normally accomplished. EMG nerve conduction findings. 1.  The EMG portion with needle insertion and probing was unrevealing. There was no spontaneous activity evoked at any muscles including paraspinals. No acute denervation or chronic denervation/reinnervation or myopathic potentials. Some muscle group encounters were more painful than others but there was no compromise of exam quality based on this exam.  Motor unit recruitment morphology number time sequencing all appropriate. 2.  The nerve conduction portion appeared to be normal.    Impression: This is a normal EMG nerve conduction interrogation of both lower extremities and associated paraspinals.   Clinical correlation is advised.   VINOD PLAZA.

## 2018-06-06 NOTE — PROGRESS NOTES
EMG/ NCS Report  DR[facility]ATION  - [facility]  [facility]  [facility], [address] [place], [address]   Ph: [phone]   FAX: [phone]/ [phone]  Test Date:  2018    Patient: Donnie Mccoy : 1980 Physician: Shraddha Garay MD   Sex: Female Height: ' \" Ref Nicolás Arevalo   ID#: [id number] Weight:  lbs. Technician: Hafsa Wheatley     Patient History / Exam:  CC: ERIC. LOWER PAIN. EMG & NCV Findings:  Evaluation of the left Fibular motor and the right Fibular motor nerves showed normal distal onset latency (L3.2, R3.5 ms), normal amplitude (L4.0, R3.7 mV), normal conduction velocity (B Fib-Ankle, L44, R49 m/s), and normal conduction velocity (Poplt-B Fib, L67, R45 m/s). The left tibial motor and the right tibial motor nerves showed normal distal onset latency (L5.9, R4.8 ms), normal amplitude (L10.9, R11.3 mV), and normal conduction velocity (Knee-Ankle, L65, R51 m/s). The left Sup Fibular sensory nerve showed normal distal peak latency (2.4 ms), normal amplitude (9.1 µV), and normal conduction velocity (Lower leg-Lat ankle, 50 m/s). The right Sup Fibular sensory nerve showed normal distal peak latency (2.8 ms), reduced amplitude (4.9 µV), and normal conduction velocity (Lower leg-Lat ankle, 111 m/s). The left sural sensory and the right sural sensory nerves showed normal distal peak latency (L3.0, R2.9 ms) and normal amplitude (L21.2, R49.2 µV). All F Wave latencies were within normal limits. All F Wave left vs. right side latency differences were within normal limits. All H Reflex left vs. right side latency differences were within normal limits. All examined muscles (as indicated in the following table) showed no evidence of electrical instability.         Impression:        ___________________________  Nayeli Snider IV, MD      Nerve Conduction Studies  Anti Sensory Summary Table     Stim Site NR Peak (ms) Norm Peak (ms) P-T Amp (µV) Norm P-T Amp Site1 Site2 Dist (cm)   Left Sup Fibular Anti Sensory (Lat ankle)  29.6°C   Lower leg    2.4 <4.5 9.1 >5 Lower leg Lat ankle 10.0   Site 2    2.4  7.6       Site 3    2.4  4.9           2.5  9.5       Right Sup Fibular Anti Sensory (Lat ankle)  29°C   Lower leg    2.8 <4.5 4.9 >5 Lower leg Lat ankle 10.0   Site 2    2.7  7.1       Site 3    2.8  4.8       Left Sural Anti Sensory (Lat Mall)  31.8°C   Calf    3.0 <4.5 21.2 >4.0 Calf Lat Mall 14.0   Site 2    3.0  21.2       Site 3    3.0  23.2       Right Sural Anti Sensory (Lat Mall)  34.2°C   Calf    2.9 <4.5 49.2 >4.0 Calf Lat Mall 14.0   Site 2    2.9  47.2         Motor Summary Table     Stim Site NR Onset (ms) Norm Onset (ms) O-P Amp (mV) Norm O-P Amp Amp (Prev) (%) Site1 Site2 Dist (cm) Marky (m/s) Norm Marky (m/s)   Left Fibular Motor (Ext Dig Brev)  29.2°C   Ankle    3.2 <6.5 4.0 >2.6 100.0 Ankle Ext Dig Brev 8.0     B Fib    10.5  2.9  72.5 B Fib Ankle 32.0 44 >38   Poplt    12.0  2.6  89.7 Poplt B Fib 10.0 67 >42   Right Fibular Motor (Ext Dig Brev)  30.4°C   Ankle    3.5 <6.5 3.7 >2.6 100.0 Ankle Ext Dig Brev 8.0     B Fib    9.8  3.1  83.8 B Fib Ankle 31.0 49 >38   Poplt    12.0  2.3  74.2 Poplt B Fib 10.0 45 >42   Left Tibial Motor (Abd Prince Brev)  32.4°C   Ankle    5.9 <6.1 10.9 >5.3 100.0 Ankle Abd Prince Brev 8.0     Knee    11.6  7.2  66.1 Knee Ankle 37.0 65 >39   Right Tibial Motor (Abd Prince Brev)  32.7°C   Ankle    4.8 <6.1 11.3 >5.3 100.0 Ankle Abd Prince Brev 8.0     Knee    12.3  10.4  92.0 Knee Ankle 38.0 51 >39     F Wave Studies     NR F-Lat (ms) Lat Norm (ms) L-R F-Lat (ms) L-R Lat Norm   Left Tibial (Mrkrs) (Abd Hallucis)  30.8°C      48.63 <56 0.00 <5.7   Right Tibial (Mrkrs) (Abd Hallucis)  31.5°C      48.63 <56 0.00 <5.7     H Reflex Studies     NR H-Lat (ms) L-R H-Lat (ms) L-R Lat Norm   Left Tibial (Gastroc)  30°C      30.40 0.00 <2.0   Right Tibial (Gastroc)  30.6°C      30.40 0.00 <2.0     EMG     Side Muscle Nerve Root Ins Act Fibs Psw Recrt Duration Amp Poly Comment   Right Ext Dig Brev Dp Br Peron L5, S1 Nml Nml Nml Nml Nml Nml Nml    Right AntTibialis Dp Br Peron L4-5 Nml Nml Nml Nml Nml Nml Nml    Right MedGastroc Tibial S1-2 Nml Nml Nml Nml Nml Nml Nml    Right VastusMed Femoral L2-4 Nml Nml Nml Nml Nml Nml Nml    Right BicepsFemL Sciatic L5-S2 Nml Nml Nml Nml Nml Nml Nml    Left Ext Dig Brev Dp Br Peron L5, S1 Nml Nml Nml Nml Nml Nml Nml    Left AntTibialis Dp Br Peron L4-5 Nml Nml Nml Nml Nml Nml Nml    Left MedGastroc Tibial S1-2 Nml Nml Nml Nml Nml Nml Nml    Left VastusMed Femoral L2-4 Nml Nml Nml Nml Nml Nml Nml    Left BicepsFemL Sciatic L5-S2 Nml Nml Nml Nml Nml Nml Nml    Left Mid Lumb Parasp Rami L4,5 Nml Nml Nml Nml Nml Nml Nml    Right Mid Lumb Parasp Rami L4,5 Nml Nml Nml Nml Nml Nml Nml                Nerve Conduction Studies  Anti Sensory Left/Right Comparison     Stim Site L Lat (ms) R Lat (ms) L-R Lat (ms) L Amp (µV) R Amp (µV) L-R Amp (%) Site1 Site2 L Marky (m/s) R Marky (m/s) L-R Marky (m/s)   Sup Fibular Anti Sensory (Lat ankle)  29.6°C   Lower leg 2.0 0.9 1.1 9.1 4.9 46.2 Lower leg Lat ankle 50 111 61   Site 2 1.8   7.6          Site 3 1.9   4.9           1.9   9.5          Sural Anti Sensory (Lat Mall)  31.8°C   Calf 2.3 2.1 0.2 21.2 49.2 56.9 Calf Lat Mall 61 67 6   Site 2 2.3 2.2 0.1 21.2 47.2 55.1        Site 3 2.3   23.2            Motor Left/Right Comparison     Stim Site L Lat (ms) R Lat (ms) L-R Lat (ms) L Amp (mV) R Amp (mV) L-R Amp (%) Site1 Site2 L Marky (m/s) R Marky (m/s) L-R Marky (m/s)   Fibular Motor (Ext Dig Brev)  29.2°C   Ankle 3.2 3.5 0.3 4.0 3.7 7.5 Ankle Ext Dig Brev      B Fib 10.5 9.8 0.7 2.9 3.1 6.5 B Fib Ankle 44 49 5   Poplt 12.0 12.0 0.0 2.6 2.3 11.5 Poplt B Fib 67 45 22   Tibial Motor (Abd Prince Brev)  32.4°C   Ankle 5.9 4.8 1.1 10.9 11.3 3.5 Ankle Abd Prince Brev      Knee 11.6 12.3 0.7 7.2 10.4 30.8 Knee Ankle 65 51 14         Waveforms:

## 2018-06-13 ENCOUNTER — TELEPHONE (OUTPATIENT)
Dept: NEUROLOGY | Age: 38
End: 2018-06-13

## 2018-06-15 ENCOUNTER — HOSPITAL ENCOUNTER (OUTPATIENT)
Dept: MRI IMAGING | Age: 38
Discharge: HOME | End: 2018-06-15
Attending: NURSE PRACTITIONER
Payer: COMMERCIAL

## 2018-06-15 DIAGNOSIS — M54.42 CHRONIC BILATERAL LOW BACK PAIN WITH BILATERAL SCIATICA: ICD-10-CM

## 2018-06-15 DIAGNOSIS — M79.605 PAIN IN BOTH LOWER EXTREMITIES: ICD-10-CM

## 2018-06-15 DIAGNOSIS — M79.604 PAIN IN BOTH LOWER EXTREMITIES: ICD-10-CM

## 2018-06-15 DIAGNOSIS — M54.41 CHRONIC BILATERAL LOW BACK PAIN WITH BILATERAL SCIATICA: ICD-10-CM

## 2018-06-15 DIAGNOSIS — G43.909 MIGRAINE WITHOUT STATUS MIGRAINOSUS, NOT INTRACTABLE, UNSPECIFIED MIGRAINE TYPE: ICD-10-CM

## 2018-06-15 DIAGNOSIS — G89.29 CHRONIC BILATERAL LOW BACK PAIN WITH BILATERAL SCIATICA: ICD-10-CM

## 2018-06-15 PROCEDURE — 70551 MRI BRAIN STEM W/O DYE: CPT

## 2018-06-20 RX ORDER — GABAPENTIN 300 MG/1
300 CAPSULE ORAL
Qty: 180 CAP | Refills: 4 | Status: SHIPPED | OUTPATIENT
Start: 2018-06-20 | End: 2019-07-10

## 2018-06-21 ENCOUNTER — OFFICE VISIT (OUTPATIENT)
Dept: NEUROLOGY | Age: 38
End: 2018-06-21

## 2018-06-21 VITALS
HEART RATE: 101 BPM | RESPIRATION RATE: 20 BRPM | SYSTOLIC BLOOD PRESSURE: 137 MMHG | DIASTOLIC BLOOD PRESSURE: 82 MMHG | BODY MASS INDEX: 40.32 KG/M2 | OXYGEN SATURATION: 99 % | WEIGHT: 242 LBS | HEIGHT: 65 IN

## 2018-06-21 DIAGNOSIS — G43.009 MIGRAINE WITHOUT AURA AND WITHOUT STATUS MIGRAINOSUS, NOT INTRACTABLE: Primary | ICD-10-CM

## 2018-06-21 NOTE — MR AVS SNAPSHOT
[address] [id number] [address] [address] 
[phone] Patient: Mariia Briceno MRN: [id number] [facility]:[birth year] Visit Information Date & Time Provider Department Dept. Phone Encounter #  
 6/21/2018 10:40 AM Perry Lopez MD [address] [facility] [phone] [id number] Follow-up Instructions Return in about 6 months (around 12/21/2018). Follow-up and Disposition History Upcoming Health Maintenance Date Due DTaP/Tdap/Td series (1 - Tdap) 5/16/1999 PAP AKA CERVICAL CYTOLOGY 5/16/1999 Influenza Age 5 to Adult 8/1/2018 Allergies as of 6/21/2018  Review Complete On: 6/21/2018 By: Perry Lopez MD  
 No Known Allergies Current Immunizations  Never Reviewed No immunizations on file. Not reviewed this visit You Were Diagnosed With   
  
 Codes Comments Migraine without aura and without status migrainosus, not intractable    -  Primary ICD-10-CM: G43.009 ICD-9-CM: 346.10 Vitals BP Pulse Resp Height(growth percentile) Weight(growth percentile) SpO2  
 137/82 (!) 101 20 5' 5\" (1.651 m) 242 lb (109.8 kg) 99% BMI OB Status Smoking Status 40.27 kg/m2 IUD Never Smoker Vitals History BMI and BSA Data Body Mass Index Body Surface Area  
 40.27 kg/m 2 2.24 m 2 Preferred Pharmacy Pharmacy Name Phone [facility] [address] [phone] Your Updated Medication List  
  
   
This list is accurate as of 6/21/18 11:19 AM.  Always use your most recent med list.  
  
  
  
  
 cyclobenzaprine 10 mg tablet Commonly known as:  FLEXERIL Take  by mouth three (3) times daily as needed for Muscle Spasm(s). FIORICET PO Take 2 Tabs by mouth every four (4) hours as needed. gabapentin 300 mg capsule Commonly known as:  NEURONTIN  
 Take 1 Cap by mouth two (2) times daily as needed. HYDROcodone-acetaminophen 5-325 mg per tablet Commonly known as:  [address] Take 2 Tabs by mouth every six (6) hours as needed. lisinopril-hydroCHLOROthiazide 10-12.5 mg per tablet Commonly known as:  Viva Deal Take  by mouth daily. MIRENA 20 mcg/24 hr (5 years) Iud  
Generic drug:  levonorgestrel 1 Device by IntraUTERine route once. ROBAXIN 500 mg tablet Generic drug:  methocarbamol Take  by mouth four (4) times daily. SUMAtriptan 100 mg tablet Commonly known as:  IMITREX Take 1 Tab by mouth once as needed for Migraine for up to 1 dose. Indications: Migraine  
  
 topiramate 25 mg tablet Commonly known as:  TOPAMAX 1 po qd  Indications: MIGRAINE PREVENTION Follow-up Instructions Return in about 6 months (around 12/21/2018). Patient Instructions PRESCRIPTION REFILL POLICY [facility] Statement to Patients April 1, 2014 In an effort to ensure the large volume of patient prescription refills is processed in the most efficient and expeditious manner, we are asking our patients to assist us by calling your Pharmacy for all prescription refills, this will include also your  Mail Order Pharmacy. The pharmacy will contact our office electronically to continue the refill process. Please do not wait until the last minute to call your pharmacy. We need at least 48 hours (2days) to fill prescriptions. We also encourage you to call your pharmacy before going to  your prescription to make sure it is ready. With regard to controlled substance prescription refill requests (narcotic refills) that need to be picked up at our office, we ask your cooperation by providing us with at least 72 hours (3days) notice that you will need a refill.  
 
We will not refill narcotic prescription refill requests after 4:00pm on any weekday, Monday through Thursday, or after 2:00pm on Fridays, or on the weekends. We encourage everyone to explore another way of getting your prescription refill request processed using [employer], our patient web portal through our electronic medical record system. [employer] is an efficient and effective way to communicate your medication request directly to the office and  downloadable as an sigrid on your smart phone . [employer] also features a review functionality that allows you to view your medication list as well as leave messages for your physician. Are you ready to get connected? If so please review the attatched instructions or speak to any of our staff to get you set up right away! Thank you so much for your cooperation. Should you have any questions please contact our Practice Administrator. The Physicians and Staff,  [facility] [place] [address] What is a living will? A living will is a legal form you use to write down the kind of care you want at the end of your life. It is used by the health professionals who will treat you if you aren't able to decide for yourself. If you put your wishes in writing, your loved ones and others will know what kind of care you want. They won't need to guess. This can ease your mind and be helpful to others. A living will is not the same as an estate or property will. An estate will explains what you want to happen with your money and property after you die. Is a living will a legal document? A living will is a legal document. Each state has its own laws about living urrutia. If you move to another state, make sure that your living will is legal in the state where you now live. Or you might use a universal form that has been approved by many states. This kind of form can sometimes be completed and stored online. Your electronic copy will then be available wherever you have a connection to the Internet.  In most cases, doctors will respect your wishes even if you have a form from a different state. · You don't need an  to complete a living will. But legal advice can be helpful if your state's laws are unclear, your health history is complicated, or your family can't agree on what should be in your living will. · You can change your living will at any time. Some people find that their wishes about end-of-life care change as their health changes. · In addition to making a living will, think about completing a medical power of  form. This form lets you name the person you want to make end-of-life treatment decisions for you (your \"health care agent\") if you're not able to. Many hospitals and nursing homes will give you the forms you need to complete a living will and a medical power of . · Your living will is used only if you can't make or communicate decisions for yourself anymore. If you become able to make decisions again, you can accept or refuse any treatment, no matter what you wrote in your living will. · Your state may offer an online registry. This is a place where you can store your living will online so the doctors and nurses who need to treat you can find it right away. What should you think about when creating a living will? Talk about your end-of-life wishes with your family members and your doctor. Let them know what you want. That way the people making decisions for you won't be surprised by your choices. Think about these questions as you make your living will: · Do you know enough about life support methods that might be used? If not, talk to your doctor so you know what might be done if you can't breathe on your own, your heart stops, or you're unable to swallow. · What things would you still want to be able to do after you receive life-support methods? Would you want to be able to walk? To speak? To eat on your own? To live without the help of machines? · If you have a choice, where do you want to be cared for? In your home? At a hospital or nursing home? · Do you want certain Church practices performed if you become very ill? · If you have a choice at the end of your life, where would you prefer to die? At home? In a hospital or nursing home? Somewhere else? · Would you prefer to be buried or cremated? · Do you want your organs to be donated after you die? What should you do with your living will? · Make sure that your family members and your health care agent have copies of your living will. · Give your doctor a copy of your living will to keep in your medical record. If you have more than one doctor, make sure that each one has a copy. · You may want to put a copy of your living will where it can be easily found. Where can you learn more? Go to http://sandhya-leroy.info/. Enter P611 in the search box to learn more about \"Learning About Living Perrorebecca. \" Current as of: September 24, 2016 Content Version: 11.4 © 2229-2121 [employer]. Care instructions adapted under license by [employer] (which disclaims liability or warranty for this information). If you have questions about a medical condition or this instruction, always ask your healthcare professional. [address] any warranty or liability for your use of this information. Went over patient's recent test results and are very reassuring. Took time to discuss self-help measures such as reducing stimuli and noise in the environment which would make life more miserable with migraines. Positive physical distraction such as exercise and walking could certainly help. I think sumatriptan and is a smarter approach to migraine rescue and Fioricet. Does have some ancillary issues such as neck and shoulder discomfort and will pursue dry needling.   I think water therapy is an excellent idea as well. For the time being would keep the Topamax dosed as is. Minimize stress and getting better sleep are always persuasive arguments. Patient Instructions History Introducing \[facility] [place] [facility]\"" & HEALTH SERVICES! Berenice Barlow introduces DreamNotes patient portal. Now you can access parts of your medical record, email your doctor's office, and request medication refills online. 1. In your internet browser, go to [email or web address] 2. Click on the First Time User? Click Here link in the Sign In box. You will see the New Member Sign Up page. 3. Enter your DreamNotes Access Code exactly as it appears below. You will not need to use this code after youve completed the sign-up process. If you do not sign up before the expiration date, you must request a new code. · DreamNotes Access Code: [email or web address] Expires: 8/12/2018  2:57 PM 
 
4. Enter the last four digits of your Social Security Number (xxxx) and Date of Birth (mm/dd/yyyy) as indicated and click Submit. You will be taken to the next sign-up page. 5. Create a DreamNotes ID. This will be your DreamNotes login ID and cannot be changed, so think of one that is secure and easy to remember. 6. Create a DreamNotes password. You can change your password at any time. 7. Enter your Password Reset Question and Answer. This can be used at a later time if you forget your password. 8. Enter your e-mail address. You will receive e-mail notification when new information is available in [address] 9. Click Sign Up. You can now view and download portions of your medical record. 10. Click the Download Summary menu link to download a portable copy of your medical information. If you have questions, please visit the Frequently Asked Questions section of the DreamNotes website. Remember, DreamNotes is NOT to be used for urgent needs. For medical emergencies, dial 911. Now available from your iPhone and Android! Please provide this summary of care documentation to your next provider. Your primary care clinician is listed as Antonieta Miranda. If you have any questions after today's visit, please call [phone].

## 2018-06-21 NOTE — PATIENT INSTRUCTIONS
[address] [facility]   Statement to Patients  April 1, 2014      In an effort to ensure the large volume of patient prescription refills is processed in the most efficient and expeditious manner, we are asking our patients to assist us by calling your Pharmacy for all prescription refills, this will include also your  Mail Order Pharmacy. The pharmacy will contact our office electronically to continue the refill process. Please do not wait until the last minute to call your pharmacy. We need at least 48 hours (2days) to fill prescriptions. We also encourage you to call your pharmacy before going to  your prescription to make sure it is ready. With regard to controlled substance prescription refill requests (narcotic refills) that need to be picked up at our office, we ask your cooperation by providing us with at least 72 hours (3days) notice that you will need a refill. We will not refill narcotic prescription refill requests after 4:00pm on any weekday, Monday through Thursday, or after 2:00pm on Fridays, or on the weekends. We encourage everyone to explore another way of getting your prescription refill request processed using IntelligentM, our patient web portal through our electronic medical record system. IntelligentM is an efficient and effective way to communicate your medication request directly to the office and  downloadable as an sigrid on your smart phone . IntelligentM also features a review functionality that allows you to view your medication list as well as leave messages for your physician. Are you ready to get connected? If so please review the attatched instructions or speak to any of our staff to get you set up right away! Thank you so much for your cooperation. Should you have any questions please contact our Practice Administrator.     The Physicians and Staff,  [facility] [address]  What is a living will?    A living will is a legal form you use to write down the kind of care you want at the end of your life. It is used by the health professionals who will treat you if you aren't able to decide for yourself. If you put your wishes in writing, your loved ones and others will know what kind of care you want. They won't need to guess. This can ease your mind and be helpful to others. A living will is not the same as an estate or property will. An estate will explains what you want to happen with your money and property after you die. Is a living will a legal document? A living will is a legal document. Each state has its own laws about living urrutia. If you move to another state, make sure that your living will is legal in the state where you now live. Or you might use a universal form that has been approved by many states. This kind of form can sometimes be completed and stored online. Your electronic copy will then be available wherever you have a connection to the Internet. In most cases, doctors will respect your wishes even if you have a form from a different state. · You don't need an  to complete a living will. But legal advice can be helpful if your state's laws are unclear, your health history is complicated, or your family can't agree on what should be in your living will. · You can change your living will at any time. Some people find that their wishes about end-of-life care change as their health changes. · In addition to making a living will, think about completing a medical power of  form. This form lets you name the person you want to make end-of-life treatment decisions for you (your \"health care agent\") if you're not able to. Many hospitals and nursing homes will give you the forms you need to complete a living will and a medical power of . · Your living will is used only if you can't make or communicate decisions for yourself anymore.  If you become able to make decisions again, you can accept or refuse any treatment, no matter what you wrote in your living will. · Your state may offer an online registry. This is a place where you can store your living will online so the doctors and nurses who need to treat you can find it right away. What should you think about when creating a living will? Talk about your end-of-life wishes with your family members and your doctor. Let them know what you want. That way the people making decisions for you won't be surprised by your choices. Think about these questions as you make your living will:  · Do you know enough about life support methods that might be used? If not, talk to your doctor so you know what might be done if you can't breathe on your own, your heart stops, or you're unable to swallow. · What things would you still want to be able to do after you receive life-support methods? Would you want to be able to walk? To speak? To eat on your own? To live without the help of machines? · If you have a choice, where do you want to be cared for? In your home? At a hospital or nursing home? · Do you want certain Lutheran practices performed if you become very ill? · If you have a choice at the end of your life, where would you prefer to die? At home? In a hospital or nursing home? Somewhere else? · Would you prefer to be buried or cremated? · Do you want your organs to be donated after you die? What should you do with your living will? · Make sure that your family members and your health care agent have copies of your living will. · Give your doctor a copy of your living will to keep in your medical record. If you have more than one doctor, make sure that each one has a copy. · You may want to put a copy of your living will where it can be easily found. Where can you learn more? Go to http://sandhya-leroy.info/. Enter L075 in the search box to learn more about \"Learning About Living Perkrunal. \"  Current as of: September 24, 2016  Content Version: 11.4  © 5754-7998 [employer]. Care instructions adapted under license by [employer] (which disclaims liability or warranty for this information). If you have questions about a medical condition or this instruction, always ask your healthcare professional. [address] any warranty or liability for your use of this information. Went over patient's recent test results and are very reassuring. Took time to discuss self-help measures such as reducing stimuli and noise in the environment which would make life more miserable with migraines. Positive physical distraction such as exercise and walking could certainly help. I think sumatriptan and is a smarter approach to migraine rescue and Fioricet. Does have some ancillary issues such as neck and shoulder discomfort and will pursue dry needling. I think water therapy is an excellent idea as well. For the time being would keep the Topamax dosed as is. Minimize stress and getting better sleep are always persuasive arguments.

## 2018-06-21 NOTE — PROGRESS NOTES
Neurology Consult      Subjective:      Chandler Amaya is a 34 y.o. female who comes in today with recurrent migraine headache history. Went over the normal EMG nerve conduction results recently obtained. Brain MRI was reported normal as well. Seem to be very self-assured on these 2 accounts. Says she does some careful scrutiny of the environment to minimize her discomfort with headaches. They goes to wearing earplugs sunglasses and similar points of reference. Says she also has chronic issues with neck and shoulder discomfort for which she will be heading to dry needling. Was asking if she needed an increase in her Topamax which is simply 25 mg daily. I suggested if she is making positive inroads consistently with self-help measures mentioned above that for now we could keep the dosing as is. Also was inquiring about the Botox and mention to her the insurance requirements as to what constitutes chronic migraine headaches by number and type. Says she does water therapy which I think is fantastic and sees a massage specialist which helps as well. Is not making as much progress with the physical therapist who gives her exercises and range of motion. It may be that she has reached a point of maximum improvement on that score. Cannot think of anything else to mention at this time and does think the sumatriptan and is a much better rescue remedy than the Fioricet. I would have to agree. Minimize stress get good sleep etc. revisit 6 months. Current Outpatient Prescriptions   Medication Sig Dispense Refill    gabapentin (NEURONTIN) 300 mg capsule Take 1 Cap by mouth two (2) times daily as needed. 180 Cap 4    methocarbamol (ROBAXIN) 500 mg tablet Take  by mouth four (4) times daily.  lisinopril-hydroCHLOROthiazide (PRINZIDE, ZESTORETIC) 10-12.5 mg per tablet Take  by mouth daily.  levonorgestrel (MIRENA) 20 mcg/24 hr (5 years) IUD 1 Device by IntraUTERine route once.       SUMAtriptan (IMITREX) 100 mg tablet Take 1 Tab by mouth once as needed for Migraine for up to 1 dose. Indications: Migraine 12 Tab 6    topiramate (TOPAMAX) 25 mg tablet 1 po qd  Indications: MIGRAINE PREVENTION 30 Tab 6    HYDROcodone-acetaminophen (NORCO) 5-325 mg per tablet Take 2 Tabs by mouth every six (6) hours as needed. 30 Tab 0    butalb/acetaminophen/caffeine (FIORICET PO) Take 2 Tabs by mouth every four (4) hours as needed.  cyclobenzaprine (FLEXERIL) 10 mg tablet Take  by mouth three (3) times daily as needed for Muscle Spasm(s). No Known Allergies  Past Medical History:   Diagnosis Date    Anxiety     Essential hypertension     chronic pt takes labetolol    Headache     Musculoskeletal disorder     Scoliosis     Snoring       Past Surgical History:   Procedure Laterality Date     DELIVERY ONLY      HX CARPAL TUNNEL RELEASE      HX GYN      HX ORTHOPAEDIC      HX OTHER SURGICAL      left knee surgery 2007    NEUROLOGICAL PROCEDURE UNLISTED        Social History     Social History    Marital status:      Spouse name: N/A    Number of children: N/A    Years of education: N/A     Occupational History    Not on file.      Social History Main Topics    Smoking status: Never Smoker    Smokeless tobacco: Never Used    Alcohol use Yes      Comment: wine    Drug use: No    Sexual activity: Yes     Partners: Male     Other Topics Concern    Not on file     Social History Narrative      Family History   Problem Relation Age of Onset    Diabetes Mother     Hypertension Mother     Cancer Mother     Neuropathy Mother     Hypertension Father     Cancer Father     Cancer Maternal Grandfather     Stroke Maternal Grandfather     Diabetes Paternal Grandmother     Stroke Paternal Grandmother     Heart Disease Paternal Grandmother     Heart Disease Paternal Uncle       Visit Vitals    /82    Pulse (!) 101    Resp 20    Ht 5' 5\" (1.651 m)    Wt 109.8 kg (242 lb)  SpO2 99%    BMI 40.27 kg/m2        Review of Systems:   A comprehensive review of systems was negative except for that written in the HPI. Neuro Exam:     Appearance: The patient is well developed, well nourished, provides a coherent history and is in no acute distress. Mental Status: Oriented to time, place and person. Mood and affect appropriate. Cranial Nerves:   Intact visual fields. Fundi are benign but wore sunglasses as is baseline photophobic with headaches. . JOEY, EOM's full, no nystagmus, no ptosis. Facial sensation is normal. Corneal reflexes are intact. Facial movement is symmetric. Hearing is normal bilaterally. Palate is midline with normal sternocleidomastoid and trapezius muscles are normal. Tongue is midline. Motor:  5/5 strength in upper and lower proximal and distal muscles. Normal bulk and tone. No fasciculations. Reflexes:   Deep tendon reflexes 2+/4 and symmetrical.   Sensory:   Normal to touch, pinprick and vibration. Gait:  Normal gait. Tremor:   No tremor noted. Cerebellar:  No cerebellar signs present. Neurovascular:  Normal heart sounds and regular rhythm, peripheral pulses intact, and no carotid bruits. Assessment:   Recurrent migraines. Please see history present illness. Patient seem to be satisfied with normal test results as it went to EMG nerve conduction assessment and brain MRI. Will suggest a revisit in 6 months. Plan:   Revisit 6 months.   Signed by :  Victorina Vasques MD

## 2018-06-28 ENCOUNTER — TELEPHONE (OUTPATIENT)
Dept: NEUROLOGY | Age: 38
End: 2018-06-28

## 2018-06-28 NOTE — TELEPHONE ENCOUNTER
----- Message from Lizbeth Kent sent at 6/28/2018 12:51 PM EDT -----  Regarding: Dr. Radha Cano  Pt stated she would like a call from the nurse because she is experiencing nausea(only when irritated by light) and daily headaches and would like to know if she should be taking more of the medications(\"Topamax and Imitrex\")  Best contact number [phone].

## 2018-06-29 ENCOUNTER — TELEPHONE (OUTPATIENT)
Dept: NEUROLOGY | Age: 38
End: 2018-06-29

## 2018-06-29 NOTE — TELEPHONE ENCOUNTER
Patient reports severe headache which is unresolved with all rescue medication. +nausea, +vomiting and light sensitivity. Patient instructed to obtain ER evaluation at this point.   kelton

## 2018-06-29 NOTE — TELEPHONE ENCOUNTER
----- Message from Lele Paul sent at 6/29/2018  4:02 PM EDT -----  Regarding: Dr. Rauqel Arroyo  Pt calling back requesting for the nurse to call her back. Pt lost the call due to signal. Best contact number (H)[phone].

## 2018-06-29 NOTE — TELEPHONE ENCOUNTER
----- Message from Kake Wilber sent at 6/29/2018 10:08 AM EDT -----  Regarding: Dr. Polly Mclaughlin  Pt requesting to speak the nurse in regards to serve headache and nausea. Pt stated, she has been experiencing this for 3 days now. This is the second request the first request sent yesterday Thursday 06/28/18 with no response from the office. Pt inquiring on what could be done in regards to this matter.  Best contact number [phone]

## 2018-06-29 NOTE — TELEPHONE ENCOUNTER
----- Message from Suzy Newell sent at 6/29/2018 10:08 AM EDT -----  Regarding: Dr. Fransisca Ceja  Pt requesting to speak the nurse in regards to serve headache and nausea. Pt stated, she has been experiencing this for 3 days now. This is the second request the first request sent yesterday Thursday 06/28/18 with no response from the office. Pt inquiring on what could be done in regards to this matter.  Best contact number [phone]

## 2018-08-30 ENCOUNTER — TELEPHONE (OUTPATIENT)
Dept: NEUROLOGY | Age: 38
End: 2018-08-30

## 2018-08-30 NOTE — TELEPHONE ENCOUNTER
Patient wanted to ask if notes were received from ER visit @ [facility]. Patient is having increased migraines. Wanted to discuss Rx and what to do to treat migraines.  [phone]

## 2018-08-31 ENCOUNTER — OFFICE VISIT (OUTPATIENT)
Dept: NEUROLOGY | Age: 38
End: 2018-08-31

## 2018-08-31 VITALS
DIASTOLIC BLOOD PRESSURE: 86 MMHG | SYSTOLIC BLOOD PRESSURE: 122 MMHG | HEIGHT: 65 IN | WEIGHT: 242 LBS | BODY MASS INDEX: 40.32 KG/M2

## 2018-08-31 DIAGNOSIS — G43.919 INTRACTABLE MIGRAINE WITHOUT STATUS MIGRAINOSUS, UNSPECIFIED MIGRAINE TYPE: ICD-10-CM

## 2018-08-31 DIAGNOSIS — M54.17 LUMBOSACRAL RADICULOPATHY: Primary | ICD-10-CM

## 2018-08-31 RX ORDER — TOPIRAMATE 100 MG/1
TABLET, FILM COATED ORAL
Qty: 90 TAB | Refills: 1 | Status: SHIPPED | OUTPATIENT
Start: 2018-08-31 | End: 2020-04-28

## 2018-08-31 RX ORDER — KETOROLAC TROMETHAMINE 10 MG/1
10 TABLET, FILM COATED ORAL
Refills: 0 | COMMUNITY
Start: 2018-08-29 | End: 2019-10-11 | Stop reason: SDUPTHER

## 2018-08-31 RX ORDER — TOPIRAMATE 100 MG/1
TABLET, FILM COATED ORAL
Qty: 30 TAB | Refills: 5 | Status: SHIPPED | OUTPATIENT
Start: 2018-08-31 | End: 2018-08-31

## 2018-08-31 RX ORDER — DIAZEPAM 5 MG/1
1 TABLET ORAL AS NEEDED
COMMUNITY
Start: 2018-06-30 | End: 2019-05-28

## 2018-08-31 RX ORDER — SUMATRIPTAN 100 MG/1
100 TABLET, FILM COATED ORAL
Qty: 36 TAB | Refills: 1 | Status: SHIPPED | OUTPATIENT
Start: 2018-08-31 | End: 2018-08-31

## 2018-08-31 NOTE — PROGRESS NOTES
Patient has been seeing ortho for her left knee. They wanted her to try celebrex but she is trying tumeric and glycosamine first.     Headaches were better at first, but have gotten worse. She went to the ER 7/21/18.

## 2018-08-31 NOTE — PROGRESS NOTES
Sveta Mcfadden is a 34 y.o. female who presents with the following  Chief Complaint   Patient presents with    Follow-up       HPI Patient comes in for a new complaint of daily, chronic, intractable migraines. Having daily migraines lasting 24 hours and longer. Having a total of 30 migraines a month and 30 days a month with headaches. Has been to the ER a few times since last visit due to intractable migraines. She is currently on BP medication, has tried Lexapro, is on Gabapentin, Topamax with chronic daily migraine. Imitrex does dull the migraines but is not a full rescue therapy. She is also using Toradol if this does not help. She has a extensive history of back issues that since April 9th have gotten a lot worse. She states she has been seeing Dr. Mary Carmona at [facility] and seen [facility] pain specialists and had injections that did not help. She has also had PT multiple occasions, dry needling, manual manipulation and pelvic floor exercises with no real relief. Currently doing PT and water therapy through [facility]. 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 Will attach reports to media file as brought in by patient. Pain and overall functioning has gotten worse recently with today coming into the office a 9/10 pain scale even after hydrocodone through her PCP. She was on Gabapentin 300 mg TID at one point and noticed no changes in pain relief and it makes her tired, feeling slower. She is lying on the table today in visible head pain and can not stand, sit, walk, or lie down for extended periods of time due to discomfort, pain, and radiation from lumbar spine into the legs, mostly on the lateral side of her hips to the backs of her legs and down the leg. She has pain, numbness, tingling, gait instability, balance trouble, falls. She feels pins and needles deep in her buttocks, legs, thighs.   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 Migraines have been worse recently also and she can not work due to this either. Screens, sociailzing make things worse. With migraines she has nausea, vomiting, dizziness, light and sound sensitivity. She has to wear her sunglasses anywhere she does due to visual disturbance and sensitivity making things worse. No Known Allergies    Current Outpatient Prescriptions   Medication Sig    ketorolac (TORADOL) 10 mg tablet Take 10 mg by mouth every six (6) hours as needed.  diazePAM (VALIUM) 5 mg tablet Take 1 Tab by mouth as needed.  onabotulinumtoxinA (BOTOX) 200 unit injection INJECT 155 UNITS IN NECK AND FACE INTRAMUSCULARLY AT 31 FDA APPROVED SITES EVERY 12 WEEKS FOR CHRONIC MIGRAINE.  topiramate (TOPAMAX) 100 mg tablet Take 1 tablet by mouth nightly.  SUMAtriptan (IMITREX) 100 mg tablet Take 1 Tab by mouth once as needed for Migraine for up to 1 dose. Indications: Migraine    gabapentin (NEURONTIN) 300 mg capsule Take 1 Cap by mouth two (2) times daily as needed.  methocarbamol (ROBAXIN) 500 mg tablet Take  by mouth as needed.  lisinopril-hydroCHLOROthiazide (PRINZIDE, ZESTORETIC) 10-12.5 mg per tablet Take  by mouth daily.  levonorgestrel (MIRENA) 20 mcg/24 hr (5 years) IUD 1 Device by IntraUTERine route once.  HYDROcodone-acetaminophen (NORCO) 5-325 mg per tablet Take 2 Tabs by mouth every six (6) hours as needed. No current facility-administered medications for this visit.         History   Smoking Status    Never Smoker   Smokeless Tobacco    Never Used       Past Medical History:   Diagnosis Date    Anxiety     Essential hypertension     chronic pt takes labetolol    Headache     Musculoskeletal disorder     Scoliosis     Snoring        Past Surgical History:   Procedure Laterality Date     DELIVERY ONLY      HX CARPAL TUNNEL RELEASE      HX GYN      HX ORTHOPAEDIC      HX OTHER SURGICAL      left knee surgery 2007    NEUROLOGICAL PROCEDURE UNLISTED         Family History   Problem Relation Age of Onset    Diabetes Mother     Hypertension Mother     Cancer Mother     Neuropathy Mother     Hypertension Father     Cancer Father     Cancer Maternal Grandfather     Stroke Maternal Grandfather     Diabetes Paternal Grandmother     Stroke Paternal Grandmother     Heart Disease Paternal Grandmother     Heart Disease Paternal Uncle        Social History     Social History    Marital status:      Spouse name: N/A    Number of children: N/A    Years of education: N/A     Social History Main Topics    Smoking status: Never Smoker    Smokeless tobacco: Never Used    Alcohol use Yes      Comment: wine    Drug use: No    Sexual activity: Yes     Partners: Male     Other Topics Concern    None     Social History Narrative       Review of Systems   Eyes: Positive for blurred vision, double vision and photophobia. Cardiovascular: Negative for chest pain and palpitations. Gastrointestinal: Positive for nausea. Negative for vomiting. Neurological: Positive for tingling, sensory change and headaches. Negative for dizziness and tremors. Remainder of comprehensive review is negative. Physical Exam :    Visit Vitals    /86    Ht 5' 5\" (1.651 m)    Wt 109.8 kg (242 lb)    BMI 40.27 kg/m2       General: Well defined, nourished, and groomed individual in no acute distress.    Neck: Supple, nontender, no bruits, no pain with resistance to active range of motion.    Heart: Regular rate and rhythm, no murmurs, rub, or gallop. Normal S1S2.   Lungs: Clear to auscultation bilaterally with equal chest expansion, no cough, no wheeze  Musculoskeletal: Extremities revealed no edema and had full range of motion of joints.    Psych: lights dimmed, visible pain with head     NEUROLOGICAL EXAMINATION:    Mental Status: Alert and oriented to person, place, and time    Cranial Nerves:    II, III, IV, VI: unable to assess due to current HA. Lying on table with lights out   V-XII: Hearing is grossly intact. Facial features are symmetric, with normal sensation and strength. The palate rises symmetrically and the tongue protrudes midline. Sternocleidomastoids 5/5. Motor Examination: Normal tone, bulk, and strength, 5/5 muscle strength throughout. Coordination: could not assess due to current symptoms     Gait and Station: Steady while walking. Normal arm swing. No pronator drift. No muscle wasting or fasiculations noted. Results for orders placed or performed during the hospital encounter of 03/27/13   GYN RAPID GP B STREP   Result Value Ref Range    GrBStrep, External negative    RPR   Result Value Ref Range    RPR, External nonreactive    HEP B SURFACE AG   Result Value Ref Range    HBsAg, External negative    HIV-1 AB WESTERN BLOT CONFIRM ONLY   Result Value Ref Range    HIV, External negative    RUBELLA AB, IGM   Result Value Ref Range    Rubella, External immune    CBC WITH AUTOMATED DIFF   Result Value Ref Range    WBC 7.7 3.6 - 11.0 K/uL    RBC 3.52 (L) 3.80 - 5.20 M/uL    HGB 11.2 (L) 11.5 - 16.0 g/dL    HCT 32.7 (L) 35.0 - 47.0 %    MCV 92.9 80.0 - 99.0 FL    MCH 31.8 26.0 - 34.0 PG    MCHC 34.3 30.0 - 36.5 g/dL    RDW 14.8 (H) 11.5 - 14.5 %    PLATELET 683 746 - 601 K/uL    NEUTROPHILS 67 32 - 75 %    LYMPHOCYTES 24 12 - 49 %    MONOCYTES 8 5 - 13 %    EOSINOPHILS 1 0 - 7 %    BASOPHILS 0 0 - 1 %    ABS. NEUTROPHILS 5.2 1.8 - 8.0 K/UL    ABS. LYMPHOCYTES 1.8 0.8 - 3.5 K/UL    ABS. MONOCYTES 0.6 0.0 - 1.0 K/UL    ABS. EOSINOPHILS 0.1 0.0 - 0.4 K/UL    ABS.  BASOPHILS 0.0 0.0 - 0.1 K/UL   TYPE, ABO & RH   Result Value Ref Range    ABO,Rh O positive        Orders Placed This Encounter    REFERRAL TO NEUROLOGY     Referral Priority:   Routine     Referral Type:   Consultation     Referral Reason:   Specialty Services Required     Referred to Provider:   Keke Forrest MD    ketorolac (TORADOL) 10 mg tablet     Sig: Take 10 mg by mouth every six (6) hours as needed. Refill:  0    diazePAM (VALIUM) 5 mg tablet     Sig: Take 1 Tab by mouth as needed.  DISCONTD: topiramate (TOPAMAX) 100 mg tablet     Sig: Take 1 tablet by mouth nightly. Dispense:  30 Tab     Refill:  5    onabotulinumtoxinA (BOTOX) 200 unit injection     Sig: INJECT 155 UNITS IN NECK AND FACE INTRAMUSCULARLY AT 31 FDA APPROVED SITES EVERY 12 WEEKS FOR CHRONIC MIGRAINE. Dispense:  1 Vial     Refill:  5    topiramate (TOPAMAX) 100 mg tablet     Sig: Take 1 tablet by mouth nightly. Dispense:  90 Tab     Refill:  1    SUMAtriptan (IMITREX) 100 mg tablet     Sig: Take 1 Tab by mouth once as needed for Migraine for up to 1 dose. Indications: Migraine     Dispense:  36 Tab     Refill:  1       1. Lumbosacral radiculopathy    2. Intractable migraine without status migrainosus, unspecified migraine type        Follow-up Disposition:  Return botox. Migraines are worse recently in regards to being more painful, causing ER visit. Still having daily, chronic migraines in the form of 30 days a month with a migraine lasting all day and night. She is currently on BP medication, AED, and has tried SSRI without relief. We discussed botox for prevention of migraines per FDA approval and we will proceed with this for her migraine prevention medication as this should help significantly. Will also increase Topamax to 100 mg nightly to see if this can help acutely. Continue imitrex for rescue and toradol tablets from ER for even better rescue if Imitrex does not work. We discussed botox and she has no questions. Follow after. PE was unable to be fully done due to current pain and symptoms patient is experiencing.        This note will not be viewable in [employer]

## 2018-08-31 NOTE — PATIENT INSTRUCTIONS
10 [facility]   Statement to Patients  April 1, 2014      In an effort to ensure the large volume of patient prescription refills is processed in the most efficient and expeditious manner, we are asking our patients to assist us by calling your Pharmacy for all prescription refills, this will include also your  Mail Order Pharmacy. The pharmacy will contact our office electronically to continue the refill process. Please do not wait until the last minute to call your pharmacy. We need at least 48 hours (2days) to fill prescriptions. We also encourage you to call your pharmacy before going to  your prescription to make sure it is ready. With regard to controlled substance prescription refill requests (narcotic refills) that need to be picked up at our office, we ask your cooperation by providing us with at least 72 hours (3days) notice that you will need a refill. We will not refill narcotic prescription refill requests after 4:00pm on any weekday, Monday through Thursday, or after 2:00pm on Fridays, or on the weekends. We encourage everyone to explore another way of getting your prescription refill request processed using [employer], our patient web portal through our electronic medical record system. [employer] is an efficient and effective way to communicate your medication request directly to the office and  downloadable as an sigrid on your smart phone . [employer] also features a review functionality that allows you to view your medication list as well as leave messages for your physician. Are you ready to get connected? If so please review the attatched instructions or speak to any of our staff to get you set up right away! Thank you so much for your cooperation. Should you have any questions please contact our Practice Administrator.     The Physicians and Staff,  [facility]
None

## 2018-08-31 NOTE — MR AVS SNAPSHOT
[address] [address] [facility] [address] [facility] [id number] [phone] Patient: More Kim MRN: [id number] Visit Information Date & Time Provider Department Dept. Phone Encounter #  
 2018 10:30 AM James Boyer NP [facility]rology [facility] [phone] [id number] Follow-up Instructions Return botox. Your Appointments 2018 10:40 AM  
Follow Up with Keke Forrest MD  
[address] [facility] Appt Note: follow up migraines    kelton   18  
 [address] [place] [address] [phone] [address]  
[place] [address] [address] [facility] [address] Upcoming Health Maintenance Date Due DTaP/Tdap/Td series (1 - Tdap) 1999 PAP AKA CERVICAL CYTOLOGY 1999 Influenza Age 5 to Adult 2018 Allergies as of 2018  Review Complete On: 2018 By: Aleshia Stein No Known Allergies Current Immunizations  Never Reviewed No immunizations on file. Not reviewed this visit You Were Diagnosed With   
  
 Codes Comments Migraine with aura and with status migrainosus, not intractable    -  Primary ICD-10-CM: G43. [place] ICD-9-CM: 346.03 Lumbosacral radiculopathy     ICD-10-CM: M54.17 ICD-9-CM: 724.4 Vitals BP Height(growth percentile) Weight(growth percentile) BMI OB Status Smoking Status 122/86 5' 5\" (1.651 m) 242 lb (109.8 kg) 40.27 kg/m2 IUD Never Smoker BMI and BSA Data Body Mass Index Body Surface Area  
 40.27 kg/m 2 2.24 m 2 Preferred Pharmacy Pharmacy Name Phone Jannette Elmore, [facility] [phone] Your Updated Medication List  
  
   
This list is accurate as of 18 11:04 AM.  Always use your most recent med list.  
  
  
  
  
 diazePAM 5 mg tablet Commonly known as:  VALIUM Take 1 Tab by mouth as needed. gabapentin 300 mg capsule Commonly known as:  NEURONTIN Take 1 Cap by mouth two (2) times daily as needed. HYDROcodone-acetaminophen 5-325 mg per tablet Commonly known as:  [place] Take 2 Tabs by mouth every six (6) hours as needed. ketorolac 10 mg tablet Commonly known as:  TORADOL Take 10 mg by mouth every six (6) hours as needed. lisinopril-hydroCHLOROthiazide 10-12.5 mg per tablet Commonly known as:  Lucetta Cristian Take  by mouth daily. MIRENA 20 mcg/24 hr (5 years) Iud  
Generic drug:  levonorgestrel 1 Device by IntraUTERine route once. onabotulinumtoxinA 200 unit injection Commonly known as:  BOTOX INJECT 155 UNITS IN NECK AND FACE INTRAMUSCULARLY AT 31 FDA APPROVED SITES EVERY 12 WEEKS FOR CHRONIC MIGRAINE. ROBAXIN 500 mg tablet Generic drug:  methocarbamol Take  by mouth as needed. SUMAtriptan 100 mg tablet Commonly known as:  IMITREX Take 1 Tab by mouth once as needed for Migraine for up to 1 dose. Indications: Migraine  
  
 topiramate 100 mg tablet Commonly known as:  TOPAMAX Take 1 tablet by mouth nightly. Prescriptions Printed Refills  
 onabotulinumtoxinA (BOTOX) 200 unit injection 5 Sig: INJECT 155 UNITS IN NECK AND FACE INTRAMUSCULARLY AT 31 FDA APPROVED SITES EVERY 12 WEEKS FOR CHRONIC MIGRAINE. Class: Print Prescriptions Sent to Pharmacy Refills  
 topiramate (TOPAMAX) 100 mg tablet 1 Sig: Take 1 tablet by mouth nightly. Class: Normal  
 Pharmacy: [address] [place] [address], [address] [place] [address] Ph #: [phone] SUMAtriptan (IMITREX) 100 mg tablet 1 Sig: Take 1 Tab by mouth once as needed for Migraine for up to 1 dose. Indications: Migraine Class: Normal  
 Pharmacy: [address] [place] [address], [address] [place] [address] Ph #: [phone]  Route: Oral  
  
 We Performed the Following REFERRAL TO NEUROLOGY [VKS19 Custom] Comments:  
 botox Follow-up Instructions Return botox. Referral Information Referral ID Referred By Referred To  
  
 [id number] MD [address] [address] [address] Phone: [phone] Fax: [phone] Visits Status Start Date End Date 1 New Request 8/31/18 8/31/19 If your referral has a status of pending review or denied, additional information will be sent to support the outcome of this decision. Patient Instructions PRESCRIPTION REFILL POLICY [facility] Statement to Patients April 1, 2014 In an effort to ensure the large volume of patient prescription refills is processed in the most efficient and expeditious manner, we are asking our patients to assist us by calling your Pharmacy for all prescription refills, this will include also your  Mail Order Pharmacy. The pharmacy will contact our office electronically to continue the refill process. Please do not wait until the last minute to call your pharmacy. We need at least 48 hours (2days) to fill prescriptions. We also encourage you to call your pharmacy before going to  your prescription to make sure it is ready. With regard to controlled substance prescription refill requests (narcotic refills) that need to be picked up at our office, we ask your cooperation by providing us with at least 72 hours (3days) notice that you will need a refill. We will not refill narcotic prescription refill requests after 4:00pm on any weekday, Monday through Thursday, or after 2:00pm on Fridays, or on the weekends.   
  
We encourage everyone to explore another way of getting your prescription refill request processed using [employer], our patient web portal through our electronic medical record system. [employer] is an efficient and effective way to communicate your medication request directly to the office and  downloadable as an sigrid on your smart phone . [employer] also features a review functionality that allows you to view your medication list as well as leave messages for your physician. Are you ready to get connected? If so please review the attatched instructions or speak to any of our staff to get you set up right away! Thank you so much for your cooperation. Should you have any questions please contact our Practice Administrator. The Physicians and Staff,  [facility] Introducing \[facility]\"" & [facility] SERVICES! [facility] introduces [employer] patient portal. Now you can access parts of your medical record, email your doctor's office, and request medication refills online. 1. In your internet browser, go to [email or web address] 2. Click on the First Time User? Click Here link in the Sign In box. You will see the New Member Sign Up page. 3. Enter your Wik[employer]on Access Code exactly as it appears below. You will not need to use this code after youve completed the sign-up process. If you do not sign up before the expiration date, you must request a new code. · [employer] Access Code: [email or web address] Expires: 11/29/2018 11:04 AM 
 
4. Enter the last four digits of your Social Security Number (xxxx) and Date of Birth (mm/dd/yyyy) as indicated and click Submit. You will be taken to the next sign-up page. 5. Create a [email or web address] ID. This will be your Wik[employer]on login ID and cannot be changed, so think of one that is secure and easy to remember. 6. Create a [employer] password. You can change your password at any time. 7. Enter your Password Reset Question and Answer. This can be used at a later time if you forget your password. 8. Enter your e-mail address. You will receive e-mail notification when new information is available in [address] 9. Click Sign Up. You can now view and download portions of your medical record. 10. Click the Download Summary menu link to download a portable copy of your medical information. If you have questions, please visit the Frequently Asked Questions section of the [employer] website. Remember, [employer] is NOT to be used for urgent needs. For medical emergencies, dial 911. Now available from your iPhone and Android! Please provide this summary of care documentation to your next provider. Your primary care clinician is listed as Brooklynn Bishop. If you have any questions after today's visit, please call [phone].

## 2018-09-18 ENCOUNTER — TELEPHONE (OUTPATIENT)
Dept: NEUROLOGY | Age: 38
End: 2018-09-18

## 2018-09-18 NOTE — TELEPHONE ENCOUNTER
----- Message from Lara Barahona sent at 9/18/2018 11:54 AM EDT -----  Regarding: Dr. Chema Gilmore Telephone  Patient would like to schedule an appt for Botox.  Contact is [phone]

## 2018-09-21 NOTE — TELEPHONE ENCOUNTER
Called [facility]. Patient needs to contact them to finish enrollment before delivery can be scheduled. Called patient and provided her with number to [facility] [phone]. She will call now and call us back once her part is completed for us to set up delivery. She also wanted to know which treatment options she discussed with NP at last office visit for her back. She wanted to have a clear understanding before she agreed to anything.

## 2018-09-24 NOTE — TELEPHONE ENCOUNTER
----- Message from [facility] sent at 9/21/2018  3:08 PM EDT -----  Regarding: Dr. Heather Shaw  Contact: [phone]  Pt wanted to let the doctor know that her \"Botox\" should be delivered on 9/25? Pt would like a call back to schedule her injection.

## 2018-09-25 NOTE — TELEPHONE ENCOUNTER
botox 200 units received from [facility]  [phone]    Referred to dr. Bhupinder Ramirez for botox inj  QW#[id number]  Refills 5    Received ES PA approval letter regarding Botox [facility]  Auth# [id number]  Effective 9/5/18-3/4/19    [place] update on [id number]

## 2018-10-24 ENCOUNTER — TELEPHONE (OUTPATIENT)
Dept: NEUROLOGY | Age: 38
End: 2018-10-24

## 2018-10-24 ENCOUNTER — OFFICE VISIT (OUTPATIENT)
Dept: NEUROLOGY | Age: 38
End: 2018-10-24

## 2018-10-24 VITALS
SYSTOLIC BLOOD PRESSURE: 114 MMHG | BODY MASS INDEX: 40.32 KG/M2 | HEIGHT: 65 IN | WEIGHT: 242 LBS | DIASTOLIC BLOOD PRESSURE: 78 MMHG

## 2018-10-24 DIAGNOSIS — G43.009 MIGRAINE WITHOUT AURA AND WITHOUT STATUS MIGRAINOSUS, NOT INTRACTABLE: Primary | ICD-10-CM

## 2018-10-24 RX ORDER — METHOCARBAMOL 500 MG/1
500 TABLET, FILM COATED ORAL
Qty: 90 TAB | Refills: 0 | Status: SHIPPED | OUTPATIENT
Start: 2018-10-24 | End: 2018-12-05 | Stop reason: SDUPTHER

## 2018-10-24 NOTE — TELEPHONE ENCOUNTER
Patient requesting fill of robaxin due to neck strain. She was getting dry needling done, but she is done with that. She states that she had discussed seeing someone in this office for back pain at last visit and wanted to get more information on that on who would be the specialist for that. Sitting causes her severe pain. Steroid injections have not been helping. Hydrocodone is the only medication that she can take and still be able to function. She wants to know what neurology can offer her or if she should go somewhere else like [facility] or [facility].

## 2018-10-24 NOTE — PATIENT INSTRUCTIONS
10 [facility]   Statement to Patients  April 1, 2014      In an effort to ensure the large volume of patient prescription refills is processed in the most efficient and expeditious manner, we are asking our patients to assist us by calling your Pharmacy for all prescription refills, this will include also your  Mail Order Pharmacy. The pharmacy will contact our office electronically to continue the refill process. Please do not wait until the last minute to call your pharmacy. We need at least 48 hours (2days) to fill prescriptions. We also encourage you to call your pharmacy before going to  your prescription to make sure it is ready. With regard to controlled substance prescription refill requests (narcotic refills) that need to be picked up at our office, we ask your cooperation by providing us with at least 72 hours (3days) notice that you will need a refill. We will not refill narcotic prescription refill requests after 4:00pm on any weekday, Monday through Thursday, or after 2:00pm on Fridays, or on the weekends. We encourage everyone to explore another way of getting your prescription refill request processed using "[employer]", our patient web portal through our electronic medical record system. "[employer]" is an efficient and effective way to communicate your medication request directly to the office and  downloadable as an sigrid on your smart phone . "[employer]" also features a review functionality that allows you to view your medication list as well as leave messages for your physician. Are you ready to get connected? If so please review the attatched instructions or speak to any of our staff to get you set up right away! Thank you so much for your cooperation. Should you have any questions please contact our Practice Administrator.     The Physicians and Staff,  [facility]

## 2018-10-24 NOTE — PROCEDURES
[facility] [place]  OFFICE PROCEDURE PROGRESS NOTE        Chart reviewed for the following:   Reina Martin MD, have reviewed the History, Physical and updated the Allergic reactions for [place] performed immediately prior to start of procedure:   Reina Martin MD, have performed the following reviews on Zaira Miller prior to the start of the procedure:            * Patient was identified by name and date of birth   * Agreement on procedure being performed was verified  * Risks and Benefits explained to the patient  * Procedure site verified and marked as necessary  * Patient was positioned for comfort  * Consent was signed and verified     Time: 1426  Date of procedure: 10/24/2018  Procedure performed by:  George He MD  Provider assisted by: None  Patient assisted by: self  How tolerated by patient: tolerated the procedure well with no complications  Comments: None    Botox Injection Note    Indication:   Patient has chronic migraine, and has tried/ failed multiple headache preventive medications (see clinic notes for details). She presents her first round of Botox Injection to reduce overall headache frequency, including migraines. Procedure:    Botox concentration: 200 units in 4 ml of preservative-free normal saline.   31 sites injections, distribution as follow         Units/site Sites Sides subtotal  Procerus     5  1 1 5        5   1 2 10  Frontalis     5  2 2 20  Temporalis    5  4 2 40  Occipitalis    5  3 2 30   Upper cervical paraspinalis  5  2 2 20   Trapezius    5  3 2 30    200 units Botox were reconstituted, 155 units injected as above and the remainder was unavoidably wasted    Patient tolerated procedure well.      _____________________________  George He M.D.

## 2018-10-25 NOTE — TELEPHONE ENCOUNTER
Attempted to contact patient. Mail box full    Per NP:  May need to consider surgery if injetions did not help. Could send her to a surigcal evaluation if she would like. Or she can see a neuro or orthopedic spine surgeon to look at other options?  (Routing comment)

## 2018-12-05 ENCOUNTER — OFFICE VISIT (OUTPATIENT)
Dept: NEUROLOGY | Age: 38
End: 2018-12-05

## 2018-12-05 VITALS
SYSTOLIC BLOOD PRESSURE: 130 MMHG | DIASTOLIC BLOOD PRESSURE: 82 MMHG | WEIGHT: 225 LBS | BODY MASS INDEX: 37.49 KG/M2 | HEIGHT: 65 IN

## 2018-12-05 DIAGNOSIS — M51.36 LUMBAR DEGENERATIVE DISC DISEASE: ICD-10-CM

## 2018-12-05 DIAGNOSIS — M53.3 COCCYDYNIA: Primary | ICD-10-CM

## 2018-12-05 DIAGNOSIS — G43.919 INTRACTABLE MIGRAINE WITHOUT STATUS MIGRAINOSUS, UNSPECIFIED MIGRAINE TYPE: ICD-10-CM

## 2018-12-05 RX ORDER — HYDROCODONE BITARTRATE AND ACETAMINOPHEN 5; 325 MG/1; MG/1
TABLET ORAL
Qty: 60 TAB | Refills: 0 | Status: SHIPPED | OUTPATIENT
Start: 2018-12-05 | End: 2020-05-26

## 2018-12-05 RX ORDER — METHOCARBAMOL 500 MG/1
500 TABLET, FILM COATED ORAL
Qty: 90 TAB | Refills: 5 | Status: SHIPPED | OUTPATIENT
Start: 2018-12-05 | End: 2020-03-16

## 2018-12-05 NOTE — PROGRESS NOTES
Regulo Cast is a 34 y.o. female who presents with the following  Chief Complaint   Patient presents with    Follow-up       HPI  Patient comes in for a follow up for daily, chronic, intractable migraines. Having daily migraines lasting 24 hours and longer. Having a total of 30 migraines a month and 30 days a month with headaches. Has been to the ER a few times due to intractable migraines. She is post botox # 1. It has helped decrease the pain and duration some and she has been happy with this so far. She is currently on BP medication, has tried Lexapro, is on Gabapentin, Topamax with chronic daily migraine but is noticing since increasing to 100 mg her cognitive functioning has slowed some. Wants ot try to come back off of this. Imitrex does dull the migraines but is not a full rescue therapy. She is also using Toradol if this does not help. She has a extensive history of back issues that since April 9th have gotten a lot worse. She states she has been going to [facility] and has seen Dr. Michelle Deleon for an [facility] without relief. She has also had PT multiple occasions, dry needling, manual manipulation and pelvic floor exercises with no real relief. Currently doing PT and water therapy through [facility]. 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 She also states she has a broken tailbone which has never healed. She even went to [facility] a few years back due to this with no real relief. Will attach reports to media file as brought in by patient. Pain and overall functioning has gotten worse recently with today coming into the office a 9/10 pain scale even after hydrocodone through her PCP. Her dosing of this is 5-325 and usually takes 1 tablet a day. Sometimes she needs to take 2 and this is on a bad day. Does not do this all the time. she is having trouble finding someone to manage her pain medications now and pain management is where she wants to get an evaluation to how things can be helped. She is lying on the table today, stretching, in obvious low back pain and leg pain. The lights are dimmed and she just does not feel good. She has used Valium in the past but this does knock her out. Robaxin can help. She was on Gabapentin 300 mg TID at one point and noticed no changes in pain relief and it makes her tired, feeling slower. She is lying on the table today in visible head pain and can not stand, sit, walk, or lie down for extended periods of time due to discomfort, pain, and radiation from lumbar spine into the legs, mostly on the lateral side of her hips to the backs of her legs and down the leg. She has pain, numbness, tingling, gait instability, balance trouble, falls. She feels pins and needles deep in her buttocks, legs, thighs. 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 Migraines have been worse recently also and she can not work due to this either. Screens, sociailzing make things worse. With migraines she has nausea, vomiting, dizziness, light and sound sensitivity. She has to wear her sunglasses anywhere she does due to visual disturbance and sensitivity making things worse. No Known Allergies    Current Outpatient Medications   Medication Sig    erenumab-aooe (AIMOVIG AUTOINJECTOR) 70 mg/mL atIn 1 Syringe by SubCUTAneous route every twenty-eight (28) days.  HYDROcodone-acetaminophen (NORCO) 5-325 mg per tablet Take 1 tablet by mouth BID PRN    methocarbamol (ROBAXIN) 500 mg tablet Take 1 Tab by mouth three (3) times daily as needed.  ketorolac (TORADOL) 10 mg tablet Take 10 mg by mouth every six (6) hours as needed.  diazePAM (VALIUM) 5 mg tablet Take 1 Tab by mouth as needed.     onabotulinumtoxinA (BOTOX) 200 unit injection INJECT 155 UNITS IN NECK AND FACE INTRAMUSCULARLY AT 31 FDA APPROVED SITES EVERY 12 WEEKS FOR CHRONIC MIGRAINE.  topiramate (TOPAMAX) 100 mg tablet Take 1 tablet by mouth nightly.  gabapentin (NEURONTIN) 300 mg capsule Take 1 Cap by mouth two (2) times daily as needed.  lisinopril-hydroCHLOROthiazide (PRINZIDE, ZESTORETIC) 10-12.5 mg per tablet Take  by mouth daily.  levonorgestrel (MIRENA) 20 mcg/24 hr (5 years) IUD 1 Device by IntraUTERine route once. No current facility-administered medications for this visit. Social History     Tobacco Use   Smoking Status Never Smoker   Smokeless Tobacco Never Used       Past Medical History:   Diagnosis Date    Anxiety     Essential hypertension     chronic pt takes labetolol    Headache     Musculoskeletal disorder     Scoliosis     Snoring        Past Surgical History:   Procedure Laterality Date     DELIVERY ONLY      HX CARPAL TUNNEL RELEASE      HX GYN      HX ORTHOPAEDIC      HX OTHER SURGICAL      left knee surgery 2007    NEUROLOGICAL PROCEDURE UNLISTED         Family History   Problem Relation Age of Onset    Diabetes Mother     Hypertension Mother     Cancer Mother     Neuropathy Mother     Hypertension Father     Cancer Father     Cancer Maternal Grandfather     Stroke Maternal Grandfather     Diabetes Paternal Grandmother     Stroke Paternal Grandmother     Heart Disease Paternal Grandmother     Heart Disease Paternal Uncle        Social History     Socioeconomic History    Marital status:      Spouse name: Not on file    Number of children: Not on file    Years of education: Not on file    Highest education level: Not on file   Tobacco Use    Smoking status: Never Smoker    Smokeless tobacco: Never Used   Substance and Sexual Activity    Alcohol use: Yes     Comment: wine    Drug use: No    Sexual activity: Yes     Partners: Male       Review of Systems   Constitutional: Positive for malaise/fatigue.    Eyes: Positive for blurred vision, double vision and photophobia. Respiratory: Negative for shortness of breath and wheezing. Cardiovascular: Negative for chest pain and palpitations. Gastrointestinal: Positive for nausea. Negative for vomiting. Musculoskeletal: Positive for back pain. Negative for falls. Neurological: Positive for tingling, sensory change, weakness and headaches. Negative for dizziness, tremors, seizures and loss of consciousness. Remainder of comprehensive review is negative. Physical Exam :    Visit Vitals  /82   Ht 5' 5\" (1.651 m)   Wt 102.1 kg (225 lb)   BMI 37.44 kg/m²       General: Well defined, nourished, and groomed individual in no acute distress.    Neck: Supple, nontender, no bruits, no pain with resistance to active range of motion.    Musculoskeletal: Extremities revealed no edema and had full range of motion of joints.    Psych: lights dimmed, visible pain with head. Sunglasses on,      NEUROLOGICAL EXAMINATION:    Mental Status: Alert and oriented to person, place, and time     Cranial Nerves:    II, III, IV, VI: unable to assess due to current HA. Lying on table with lights out   V-XII: Hearing is grossly intact. Facial features are symmetric, with normal sensation and strength. The palate rises symmetrically and the tongue protrudes midline.  Sternocleidomastoids 5/5.      Motor Examination: Normal tone, bulk, and strength, 4/5 muscle strength throughout.      Coordination: could not assess due to current symptoms      Gait and Station: Steady while walking           Results for orders placed or performed during the hospital encounter of 03/27/13   GYN RAPID GP B STREP   Result Value Ref Range    GrBStrep, External negative    RPR   Result Value Ref Range    RPR, External nonreactive    HEP B SURFACE AG   Result Value Ref Range    HBsAg, External negative    HIV-1 AB WESTERN BLOT CONFIRM ONLY   Result Value Ref Range    HIV, External negative    RUBELLA AB, IGM   Result Value Ref Range    Rubella, External immune    CBC WITH AUTOMATED DIFF   Result Value Ref Range    WBC 7.7 3.6 - 11.0 K/uL    RBC 3.52 (L) 3.80 - 5.20 M/uL    HGB 11.2 (L) 11.5 - 16.0 g/dL    HCT 32.7 (L) 35.0 - 47.0 %    MCV 92.9 80.0 - 99.0 FL    MCH 31.8 26.0 - 34.0 PG    MCHC 34.3 30.0 - 36.5 g/dL    RDW 14.8 (H) 11.5 - 14.5 %    PLATELET 244 804 - 870 K/uL    NEUTROPHILS 67 32 - 75 %    LYMPHOCYTES 24 12 - 49 %    MONOCYTES 8 5 - 13 %    EOSINOPHILS 1 0 - 7 %    BASOPHILS 0 0 - 1 %    ABS. NEUTROPHILS 5.2 1.8 - 8.0 K/UL    ABS. LYMPHOCYTES 1.8 0.8 - 3.5 K/UL    ABS. MONOCYTES 0.6 0.0 - 1.0 K/UL    ABS. EOSINOPHILS 0.1 0.0 - 0.4 K/UL    ABS. BASOPHILS 0.0 0.0 - 0.1 K/UL   TYPE, ABO & RH   Result Value Ref Range    ABO,Rh O positive        Orders Placed This Encounter    REFERRAL TO PAIN MANAGEMENT     Referral Priority:   Routine     Referral Type:   Consultation     Referral Reason:   Specialty Services Required     Referred to Provider:   Rodney Hatchet, MD     Number of Visits Requested:   1    erenumab-aooe (AIMOVIG AUTOINJECTOR) 70 mg/mL atIn     Si Syringe by SubCUTAneous route every twenty-eight (28) days. Dispense:  1 Syringe     Refill:  5    HYDROcodone-acetaminophen (NORCO) 5-325 mg per tablet     Sig: Take 1 tablet by mouth BID PRN     Dispense:  60 Tab     Refill:  0    methocarbamol (ROBAXIN) 500 mg tablet     Sig: Take 1 Tab by mouth three (3) times daily as needed. Dispense:  90 Tab     Refill:  5       1. Coccydynia    2. Lumbar degenerative disc disease    3. Intractable migraine without status migrainosus, unspecified migraine type        Follow-up Disposition:  Return in about 2 months (around 2019). Migraines are a little better with botox# 1. We discussed  Needing # 2 to fully assess progress as this is how it was studied through the FDA in order to figure out how effective. We will be due to get this in end of January.  Still having daily, chronic migraines in the form of 30 days a month but not as painful or lasting as long. Migraines have decreased but the day to day headaches are still lingering. She is currently on BP medication, AED, and has tried SSRI without relief. Will come off Topamax due to cognitive issues. Continue imitrex for rescue and toradol tablets from ER for even better rescue if Imitrex does not work. We discussed Aimovig and will start this today in adjunct with Botox as she has had to go onto long term disability for work due to her head, back symptoms and is unable to function properly and effectively on a day to day basis. We discussed goals of care. We will want aggressive therapy. PE was unable to be fully done due to current pain and symptoms patient is experiencing. Send to Dr. Houston Vences to evaluate and help treat chronic pain but will refill [place] 5-325 to be able to be taking BID PRN if needed as this does give her the best relief.        This note will not be viewable in [facility]

## 2018-12-05 NOTE — PATIENT INSTRUCTIONS
[facility]   Statement to Patients  April 1, 2014      In an effort to ensure the large volume of patient prescription refills is processed in the most efficient and expeditious manner, we are asking our patients to assist us by calling your Pharmacy for all prescription refills, this will include also your  Mail Order Pharmacy. The pharmacy will contact our office electronically to continue the refill process. Please do not wait until the last minute to call your pharmacy. We need at least 48 hours (2days) to fill prescriptions. We also encourage you to call your pharmacy before going to  your prescription to make sure it is ready. With regard to controlled substance prescription refill requests (narcotic refills) that need to be picked up at our office, we ask your cooperation by providing us with at least 72 hours (3days) notice that you will need a refill. We will not refill narcotic prescription refill requests after 4:00pm on any weekday, Monday through Thursday, or after 2:00pm on Fridays, or on the weekends. We encourage everyone to explore another way of getting your prescription refill request processed using [employer], our patient web portal through our electronic medical record system. [employer] is an efficient and effective way to communicate your medication request directly to the office and  downloadable as an sigrid on your smart phone . [employer] also features a review functionality that allows you to view your medication list as well as leave messages for your physician. Are you ready to get connected? If so please review the attatched instructions or speak to any of our staff to get you set up right away! Thank you so much for your cooperation. Should you have any questions please contact our Practice Administrator.     The Physicians and Staff,  [facility]

## 2018-12-10 ENCOUNTER — TELEPHONE (OUTPATIENT)
Dept: NEUROLOGY | Age: 38
End: 2018-12-10

## 2018-12-13 NOTE — TELEPHONE ENCOUNTER
PA submitted via Cover My Meds. Request returned as favorable/approved.  (Key: [id number]) [id number]

## 2018-12-19 ENCOUNTER — TELEPHONE (OUTPATIENT)
Dept: NEUROLOGY | Age: 38
End: 2018-12-19

## 2018-12-19 NOTE — TELEPHONE ENCOUNTER
Dr. Odilon Mcduffie called from [address] [place] [address] and [facility]. He said he needs to speak with you regarding the patient. I told him you needed to know more than he's requesting a call back but he wouldn't tell me specifically what it was.

## 2018-12-20 NOTE — TELEPHONE ENCOUNTER
Per NP:  Discussed pain management and appointment with Dr. Norberto Lombardo.  No further questions  (Routing comment)

## 2019-01-08 NOTE — TELEPHONE ENCOUNTER
----- Message from Elizabeth Pace sent at 1/8/2019  1:28 PM EST -----  Regarding: MARLYN Foote/ dakota  The pt is requesting a callback regarding her injection for her migraines. She stated she is overdue for the injection, but doesn't know where to get the next one from.       Best contact number is [phone]

## 2019-01-09 NOTE — TELEPHONE ENCOUNTER
R/t call to patient. NP wanted her to continue with botox but we have not gotten her medication in from [facility]. She is going to call today to give permission to ship. She also knows that she was approved for aimovig but did not think script was sent. Informed her script was sent.

## 2019-01-15 NOTE — TELEPHONE ENCOUNTER
Called [facility] pharmacy. Spoke with Mary. Patient verification matched with phone number. Address does not match. Scheduled botox delivery for 01/22/19. However medication was rejected and states it needs a new PA. Informed them of approval on file until 3/4/19.  They will review denial.

## 2019-01-22 ENCOUNTER — TELEPHONE (OUTPATIENT)
Dept: NEUROLOGY | Age: 39
End: 2019-01-22

## 2019-01-22 NOTE — TELEPHONE ENCOUNTER
Attempted to contact patient to schedule botox appt with Dr. Teresita Batista. Patient was due 1/16/19. She is now overdue.  [id number] good until 03/2019

## 2019-01-28 NOTE — TELEPHONE ENCOUNTER
----- Message from Rangel Dey sent at 1/25/2019  4:06 PM EST -----  Regarding: MARLYN Joel/Telephone  Pt is requesting an appt for Botox and a follow up appt.     Best contact # [phone]

## 2019-01-30 ENCOUNTER — HOSPITAL ENCOUNTER (OUTPATIENT)
Dept: GENERAL RADIOLOGY | Age: 39
Discharge: HOME | End: 2019-01-30
Attending: OTOLARYNGOLOGY
Payer: COMMERCIAL

## 2019-01-30 DIAGNOSIS — R13.10 DYSPHAGIA: ICD-10-CM

## 2019-01-30 PROCEDURE — 74220 X-RAY XM ESOPHAGUS 1CNTRST: CPT

## 2019-01-31 NOTE — TELEPHONE ENCOUNTER
Pt is calling in ref to getting a status on a return call for Botox injection appt  Pt is having head aches  [facility] # [phone]

## 2019-02-01 NOTE — TELEPHONE ENCOUNTER
Contacted patient to set her up for a botox appt. We have her in for Feb. 11, 2019 at 2:20pm. Patient verbalized understanding.

## 2019-02-06 ENCOUNTER — TELEPHONE (OUTPATIENT)
Dept: NEUROLOGY | Age: 39
End: 2019-02-06

## 2019-02-06 NOTE — TELEPHONE ENCOUNTER
Pt is calling to get a status on a call back. She has questions in reference to functioning, wants to speak to Dr Ana Rosa Sebastian nurse.  She is aware of her appt on on 2/11  Pt is very upset that she doesn't get a return call, has called several times  Best # [phone]

## 2019-02-11 ENCOUNTER — OFFICE VISIT (OUTPATIENT)
Dept: NEUROLOGY | Age: 39
End: 2019-02-11

## 2019-02-11 VITALS
HEIGHT: 65 IN | SYSTOLIC BLOOD PRESSURE: 136 MMHG | OXYGEN SATURATION: 97 % | HEART RATE: 111 BPM | RESPIRATION RATE: 20 BRPM | WEIGHT: 225 LBS | DIASTOLIC BLOOD PRESSURE: 90 MMHG | BODY MASS INDEX: 37.49 KG/M2

## 2019-02-11 DIAGNOSIS — G43.919 INTRACTABLE MIGRAINE WITHOUT STATUS MIGRAINOSUS, UNSPECIFIED MIGRAINE TYPE: Primary | ICD-10-CM

## 2019-02-11 NOTE — PROCEDURES
[facility] NE[facility]OLOG[facility]IN[facility] [place]  OFFICE PROCEDURE PROGRESS NOTE        Chart reviewed for the following:   Alanis Durant MD, have reviewed the History, Physical and updated the Allergic reactions for Jillian performed immediately prior to start of procedure:   Alanis Durant MD, have performed the following reviews on Roe Montanez prior to the start of the procedure:            * Patient was identified by name and date of birth   * Agreement on procedure being performed was verified  * Risks and Benefits explained to the patient  * Procedure site verified and marked as necessary  * Patient was positioned for comfort  * Consent was signed and verified     Time: 1450  Date of procedure: 2/11/2019  Procedure performed by:  Kemi Interiano MD  Provider assisted by: None  Patient assisted by: self  How tolerated by patient: tolerated the procedure well with no complications  Comments: None    Botox Injection Note    Indication:   Patient has chronic migraine, and has tried/ failed multiple headache preventive medications (see clinic notes for details). She presents her 2nd round of Botox Injection to reduce overall headache frequency, including migraines. Procedure:    Botox concentration: 200 units in 4 ml of preservative-free normal saline.   31 sites injections, distribution as follow         Units/site Sites Sides subtotal  Procerus     5  1 1 5        5   1 2 10  Frontalis     5  2 2 20  Temporalis    5  4 2 40  Occipitalis    5  3 2 30   Upper cervical paraspinalis  5  2 2 20   Trapezius    5  3 2 30    200 units Botox were reconstituted, 155 units injected as above and the remainder was unavoidably wasted    Patient tolerated procedure well.      _____________________________  Kemi Interiano M.D.

## 2019-02-11 NOTE — PROGRESS NOTES
Botox Procedure  Pain scale prior to procedure  5 /10  Pain scale post procedure     /10  Patient states ? 30 headaches a month & lasting  ? 4 hrs to a couple of days  Been treated for headaches greater than 6 months  Consent signed     Instructions post injection discussed with patient   1. Do not lay flat for 4 hrs  2. Do not press on injection sites up to 24 hrs.         What to expect  Possible bleeding and/or swelling  at injection sites      Patient verbalizes understanding

## 2019-02-22 ENCOUNTER — TELEPHONE (OUTPATIENT)
Dept: NEUROLOGY | Age: 39
End: 2019-02-22

## 2019-02-22 NOTE — TELEPHONE ENCOUNTER
----- Message from Mena Short sent at 2/21/2019  3:46 PM EST -----  Regarding: Dr. Trisha Thompson([facility]) is requesting a call back from Dr. Pinky De Leon or nurse in reference to service request faxed to practice. Needs diagnosis code. Donna best contact [phone].

## 2019-02-22 NOTE — TELEPHONE ENCOUNTER
R/t call to [facility]. Attempted to provide diagnosis code, but they are not able to take dx code verbally form will have to be resent in. Asked if it was worth it to resend if the program will be discontinued as of march 1st. She states that they have not been informed of the discontinuation.      Will send script locally and inform patient to sign up for co pay card in order to start medication

## 2019-02-25 DIAGNOSIS — G43.919 INTRACTABLE MIGRAINE WITHOUT STATUS MIGRAINOSUS, UNSPECIFIED MIGRAINE TYPE: ICD-10-CM

## 2019-04-08 ENCOUNTER — OFFICE VISIT (OUTPATIENT)
Dept: NEUROLOGY | Age: 39
End: 2019-04-08

## 2019-04-08 VITALS
DIASTOLIC BLOOD PRESSURE: 68 MMHG | SYSTOLIC BLOOD PRESSURE: 130 MMHG | RESPIRATION RATE: 20 BRPM | OXYGEN SATURATION: 98 % | WEIGHT: 233.6 LBS | BODY MASS INDEX: 38.92 KG/M2 | HEIGHT: 65 IN | HEART RATE: 89 BPM

## 2019-04-08 DIAGNOSIS — M79.10 MYALGIA: ICD-10-CM

## 2019-04-08 DIAGNOSIS — G43.709 CHRONIC MIGRAINE WITHOUT AURA WITHOUT STATUS MIGRAINOSUS, NOT INTRACTABLE: Primary | ICD-10-CM

## 2019-04-08 RX ORDER — DULOXETIN HYDROCHLORIDE 60 MG/1
60 CAPSULE, DELAYED RELEASE ORAL DAILY
COMMUNITY

## 2019-04-08 NOTE — PROGRESS NOTES
Chronic migraine headaches. Neurology Consult      Subjective:      Karen Montes is a 34 y.o. female who comes in today and is followed by nurse practitioner Tello for chronic migraines. Has had not to distant Botox update and is on Aimovig 70 mg every 30 days. Is here today because she has this all over body pain and she calls it. When more details are summoned it appears that she has discrete tender points over the nap of the neck and across the shoulders and down the lower part of the back and on the inside of both legs and both proximal arms. I am wondering whether this is fibromyalgia or a similar approximation? Will refer to rheumatology and good luck. Current Outpatient Medications   Medication Sig Dispense Refill    DULoxetine (CYMBALTA) 60 mg capsule Take 60 mg by mouth daily.  erenumab-aooe (AIMOVIG AUTOINJECTOR) 70 mg/mL atIn 1 Syringe by SubCUTAneous route every twenty-eight (28) days. 1 Syringe 5    HYDROcodone-acetaminophen (NORCO) 5-325 mg per tablet Take 1 tablet by mouth BID PRN 60 Tab 0    methocarbamol (ROBAXIN) 500 mg tablet Take 1 Tab by mouth three (3) times daily as needed. 90 Tab 5    ketorolac (TORADOL) 10 mg tablet Take 10 mg by mouth every six (6) hours as needed. 0    diazePAM (VALIUM) 5 mg tablet Take 1 Tab by mouth as needed.  onabotulinumtoxinA (BOTOX) 200 unit injection INJECT 155 UNITS IN NECK AND FACE INTRAMUSCULARLY AT 31 FDA APPROVED SITES EVERY 12 WEEKS FOR CHRONIC MIGRAINE. 1 Vial 5    lisinopril-hydroCHLOROthiazide (PRINZIDE, ZESTORETIC) 10-12.5 mg per tablet Take  by mouth daily.  levonorgestrel (MIRENA) 20 mcg/24 hr (5 years) IUD 1 Device by IntraUTERine route once.  onabotulinumtoxinA (BOTOX) 200 unit injection Inject IM into 31 FDA Approved Sites 200 Units 0    topiramate (TOPAMAX) 100 mg tablet Take 1 tablet by mouth nightly.  90 Tab 1    gabapentin (NEURONTIN) 300 mg capsule Take 1 Cap by mouth two (2) times daily as needed.  180 Cap 4      No Known Allergies  Past Medical History:   Diagnosis Date    Anxiety     Essential hypertension     chronic pt takes labetolol    Headache     Musculoskeletal disorder     Scoliosis     Snoring       Past Surgical History:   Procedure Laterality Date     DELIVERY ONLY      HX CARPAL TUNNEL RELEASE      HX GYN      HX ORTHOPAEDIC      HX OTHER SURGICAL      left knee surgery     NEUROLOGICAL PROCEDURE UNLISTED      [place] CHEMODERVATE FACIAL/TRIGEM/CERV MUSC MIGRAINE  10/24/2018      Social History     Socioeconomic History    Marital status:      Spouse name: Not on file    Number of children: Not on file    Years of education: Not on file    Highest education level: Not on file   Occupational History    Not on file   Social Needs    Financial resource strain: Not on file    Food insecurity:     Worry: Not on file     Inability: Not on file    Transportation needs:     Medical: Not on file     Non-medical: Not on file   Tobacco Use    Smoking status: Never Smoker    Smokeless tobacco: Never Used   Substance and Sexual Activity    Alcohol use: Yes     Comment: wine    Drug use: No    Sexual activity: Yes     Partners: Male   Lifestyle    Physical activity:     Days per week: Not on file     Minutes per session: Not on file    Stress: Not on file   Relationships    Social connections:     Talks on phone: Not on file     Gets together: Not on file     Attends Adventism service: Not on file     Active member of club or organization: Not on file     Attends meetings of clubs or organizations: Not on file     Relationship status: Not on file    Intimate partner violence:     Fear of current or ex partner: Not on file     Emotionally abused: Not on file     Physically abused: Not on file     Forced sexual activity: Not on file   Other Topics Concern    Not on file   Social History Narrative    Not on file      Family History   Problem Relation Age of Onset    Diabetes Mother     Hypertension Mother     Cancer Mother     Neuropathy Mother     Hypertension Father     Cancer Father     Cancer Maternal Grandfather     Stroke Maternal Grandfather     Diabetes Paternal Grandmother     Stroke Paternal Grandmother     Heart Disease Paternal Grandmother     Heart Disease Paternal Uncle       Visit Vitals  /68   Pulse 89   Resp 20   Ht 5' 5\" (1.651 m)   Wt 106 kg (233 lb 9.6 oz)   SpO2 98%   BMI 38.87 kg/m²        Review of Systems:   A comprehensive review of systems was negative except for that written in the HPI. Neuro Exam:     Appearance: The patient is well developed, well nourished, provides a coherent history and is in no acute distress. Mental Status: Oriented to time, place and person. Mood and affect appropriate. Cranial Nerves:   Intact visual fields. Fundi are benign. JOEY, EOM's full, no nystagmus, no ptosis. Facial sensation is normal. Corneal reflexes are intact. Facial movement is symmetric. Hearing is normal bilaterally. Palate is midline with normal sternocleidomastoid and trapezius muscles are normal. Tongue is midline. Motor:  5/5 strength in upper and lower proximal and distal muscles. Normal bulk and tone. No fasciculations. Reflexes:   Deep tendon reflexes 2+/4 and symmetrical.   Sensory:   Normal to touch, pinprick and vibration. Gait:  Normal gait. Tremor:   No tremor noted. Cerebellar:  No cerebellar signs present. Neurovascular:  Normal heart sounds and regular rhythm, peripheral pulses intact, and no carotid bruits. Consistently and repeatedly tender over the nap of the neck and shoulders and inside of both arms proper, in the lower part of the back on both sides and the inside of both legs etc.            Assessment:   Chronic migraine headaches. Patient will follow up with nurse practitioner Tello and currently on Botox and Aimovig. Multifocal myalgia and tender points as mentioned above. [facility] these pain points and see if there is anything that even closely approximates fibromyalgia or a mimicker. Plan:   As planned above.   Signed by :  Chelsie Hurley MD

## 2019-04-08 NOTE — LETTER
4/8/19 Patient: Freddy Schulz YOB: 1980 Date of Visit: 4/8/2019 Joan Moody MD 
[facility] [address] [id number] VIA Facsimile: [phone] Dear Joan Moody MD, Thank you for referring Ms. Freddy Schulz to [facility] for evaluation. My notes for this consultation are attached. If you have questions, please do not hesitate to call me. I look forward to following your patient along with you.  
 
 
Sincerely, 
 
Supa Godwin MD

## 2019-04-08 NOTE — PATIENT INSTRUCTIONS
[facility]   Statement to Patients  April 1, 2014      In an effort to ensure the large volume of patient prescription refills is processed in the most efficient and expeditious manner, we are asking our patients to assist us by calling your Pharmacy for all prescription refills, this will include also your  Mail Order Pharmacy. The pharmacy will contact our office electronically to continue the refill process. Please do not wait until the last minute to call your pharmacy. We need at least 48 hours (2days) to fill prescriptions. We also encourage you to call your pharmacy before going to  your prescription to make sure it is ready. With regard to controlled substance prescription refill requests (narcotic refills) that need to be picked up at our office, we ask your cooperation by providing us with at least 72 hours (3days) notice that you will need a refill. We will not refill narcotic prescription refill requests after 4:00pm on any weekday, Monday through Thursday, or after 2:00pm on Fridays, or on the weekends. We encourage everyone to explore another way of getting your prescription refill request processed using LiB, our patient web portal through our electronic medical record system. LiB is an efficient and effective way to communicate your medication request directly to the office and  downloadable as an sigrid on your smart phone . LiB also features a review functionality that allows you to view your medication list as well as leave messages for your physician. Are you ready to get connected? If so please review the attatched instructions or speak to any of our staff to get you set up right away! Thank you so much for your cooperation. Should you have any questions please contact our Practice Administrator.     The Physicians and Staff,  [facility]  What is a living will?    A living will is a legal form you use to write down the kind of care you want at the end of your life. It is used by the health professionals who will treat you if you aren't able to decide for yourself. If you put your wishes in writing, your loved ones and others will know what kind of care you want. They won't need to guess. This can ease your mind and be helpful to others. A living will is not the same as an estate or property will. An estate will explains what you want to happen with your money and property after you die. Is a living will a legal document? A living will is a legal document. Each state has its own laws about living urrutia. If you move to another state, make sure that your living will is legal in the state where you now live. Or you might use a universal form that has been approved by many states. This kind of form can sometimes be completed and stored online. Your electronic copy will then be available wherever you have a connection to the Internet. In most cases, doctors will respect your wishes even if you have a form from a different state. · You don't need an  to complete a living will. But legal advice can be helpful if your state's laws are unclear, your health history is complicated, or your family can't agree on what should be in your living will. · You can change your living will at any time. Some people find that their wishes about end-of-life care change as their health changes. · In addition to making a living will, think about completing a medical power of  form. This form lets you name the person you want to make end-of-life treatment decisions for you (your \"health care agent\") if you're not able to. Many hospitals and nursing homes will give you the forms you need to complete a living will and a medical power of . · Your living will is used only if you can't make or communicate decisions for yourself anymore.  If you become able to make decisions again, you can accept or refuse any treatment, no matter what you wrote in your living will. · Your state may offer an online registry. This is a place where you can store your living will online so the doctors and nurses who need to treat you can find it right away. What should you think about when creating a living will? Talk about your end-of-life wishes with your family members and your doctor. Let them know what you want. That way the people making decisions for you won't be surprised by your choices. Think about these questions as you make your living will:  · Do you know enough about life support methods that might be used? If not, talk to your doctor so you know what might be done if you can't breathe on your own, your heart stops, or you're unable to swallow. · What things would you still want to be able to do after you receive life-support methods? Would you want to be able to walk? To speak? To eat on your own? To live without the help of machines? · If you have a choice, where do you want to be cared for? In your home? At a hospital or nursing home? · Do you want certain Mandaen practices performed if you become very ill? · If you have a choice at the end of your life, where would you prefer to die? At home? In a hospital or nursing home? Somewhere else? · Would you prefer to be buried or cremated? · Do you want your organs to be donated after you die? What should you do with your living will? · Make sure that your family members and your health care agent have copies of your living will. · Give your doctor a copy of your living will to keep in your medical record. If you have more than one doctor, make sure that each one has a copy. · You may want to put a copy of your living will where it can be easily found. Where can you learn more? Go to http://sandhya-leroy.info/. Enter F980 in the search box to learn more about \"Learning About Living Perkrunal. \"  Current as of: April 18, 2018  Content Version: 11.9  © 2963-9969 [employer]. Care instructions adapted under license by [employer] (which disclaims liability or warranty for this information). If you have questions about a medical condition or this instruction, always ask your healthcare professional. [address] any warranty or liability for your use of this information. Patient history reviewed and patient examined. Will follow up with Jem Burton for chronic migraines and on the migratory body aches tender points etc. would recommend an updated rheumatology opinion. Will refer to [facility] rheumatology.

## 2019-04-17 ENCOUNTER — TELEPHONE (OUTPATIENT)
Dept: NEUROLOGY | Age: 39
End: 2019-04-17

## 2019-04-26 ENCOUNTER — TELEPHONE (OUTPATIENT)
Dept: NEUROLOGY | Age: 39
End: 2019-04-26

## 2019-04-26 NOTE — TELEPHONE ENCOUNTER
----- Message from Brissa Counter sent at 4/25/2019  3:43 PM EDT -----  Regarding: NP Marycruz/Telephone  Pt is requesting a call back from NP Marycruz or nurse in reference to pain. Having extreme pain from eye to neck. Nausea; having difficulty sitting up. Want to knw what medication to be taken until Botox injection.      Pt best contact [phone]

## 2019-04-26 NOTE — TELEPHONE ENCOUNTER
Received botox 200 units from [facility] [phone]  #[id number]  3 refills remaining.

## 2019-04-29 RX ORDER — METHYLPREDNISOLONE 4 MG/1
TABLET ORAL
Qty: 1 DOSE PACK | Refills: 0 | Status: SHIPPED | OUTPATIENT
Start: 2019-04-29 | End: 2019-07-10

## 2019-04-29 RX ORDER — METHYLPREDNISOLONE 4 MG/1
4 TABLET ORAL
Qty: 4 TAB | Refills: 0 | Status: SHIPPED | OUTPATIENT
Start: 2019-04-29 | End: 2019-07-10

## 2019-04-29 NOTE — TELEPHONE ENCOUNTER
I can try a medrol dose pack, but was in office and I examined with [facility] body aches and pains and did not make a confident finding, thus referral to rheumatology.  ursula

## 2019-05-17 ENCOUNTER — TELEPHONE (OUTPATIENT)
Dept: NEUROLOGY | Age: 39
End: 2019-05-17

## 2019-05-17 NOTE — TELEPHONE ENCOUNTER
T/C contact Isiah Spoke with [facility] Review RN regarding Both Botox injection [id number] and Botox  ( [facility])   Per Jared Mortensen Clinical Review RN it will;be best to cancel the request versus denying request due to lack of clinical information regarding S/P Botox Injection +/- response to treatment for mandatory  reduction in the number of  Migraines     PA case cancelled pending patient Follow up appointment       Clinical staff informed of this matter

## 2019-05-21 NOTE — TELEPHONE ENCOUNTER
[place] APPROVED for Botox [id number] &  after updated clinical information given. Effective dates 05/21/19 - 05/21/20. Case # [id number]. Please send Botox rx to [facility] as soon as possible. Called patient and informed her of approval. Patient requested we send for Botox as soon as possible. Would like to try and get in this week if possible.

## 2019-05-22 NOTE — TELEPHONE ENCOUNTER
Contacted patient to let her know that I can get her scheduled for her botox. She verbalized understanding and has scheduled an appt for 5/29/19 @1:40pm with .

## 2019-05-27 ENCOUNTER — APPOINTMENT (OUTPATIENT)
Dept: CT IMAGING | Age: 39
End: 2019-05-27
Attending: PHYSICIAN ASSISTANT
Payer: COMMERCIAL

## 2019-05-27 ENCOUNTER — HOSPITAL ENCOUNTER (EMERGENCY)
Age: 39
Discharge: HOME | End: 2019-05-28
Attending: EMERGENCY MEDICINE
Payer: COMMERCIAL

## 2019-05-27 ENCOUNTER — APPOINTMENT (OUTPATIENT)
Dept: GENERAL RADIOLOGY | Age: 39
End: 2019-05-27
Attending: PHYSICIAN ASSISTANT
Payer: COMMERCIAL

## 2019-05-27 DIAGNOSIS — M62.838 NECK MUSCLE SPASM: Primary | ICD-10-CM

## 2019-05-27 DIAGNOSIS — G89.29 OTHER CHRONIC PAIN: ICD-10-CM

## 2019-05-27 LAB
APPEARANCE UR: CLEAR
BACTERIA URNS QL MICRO: NEGATIVE /HPF
BASOPHILS # BLD: 0 K/UL (ref 0–0.1)
BASOPHILS NFR BLD: 0 % (ref 0–1)
BILIRUB UR QL: NEGATIVE
COLOR UR: ABNORMAL
DIFFERENTIAL METHOD BLD: NORMAL
EOSINOPHIL # BLD: 0.1 K/UL (ref 0–0.4)
EOSINOPHIL NFR BLD: 1 % (ref 0–7)
EPITH CASTS URNS QL MICRO: ABNORMAL /LPF
ERYTHROCYTE [DISTWIDTH] IN BLOOD BY AUTOMATED COUNT: 14.2 % (ref 11.5–14.5)
GLUCOSE UR STRIP.AUTO-MCNC: NEGATIVE MG/DL
HCT VFR BLD AUTO: 42.1 % (ref 35–47)
HGB BLD-MCNC: 13.8 G/DL (ref 11.5–16)
HGB UR QL STRIP: ABNORMAL
HYALINE CASTS URNS QL MICRO: ABNORMAL /LPF (ref 0–5)
IMM GRANULOCYTES # BLD AUTO: 0 K/UL (ref 0–0.04)
IMM GRANULOCYTES NFR BLD AUTO: 0 % (ref 0–0.5)
KETONES UR QL STRIP.AUTO: NEGATIVE MG/DL
LEUKOCYTE ESTERASE UR QL STRIP.AUTO: NEGATIVE
LYMPHOCYTES # BLD: 3.5 K/UL (ref 0.8–3.5)
LYMPHOCYTES NFR BLD: 40 % (ref 12–49)
MCH RBC QN AUTO: 31.6 PG (ref 26–34)
MCHC RBC AUTO-ENTMCNC: 32.8 G/DL (ref 30–36.5)
MCV RBC AUTO: 96.3 FL (ref 80–99)
MONOCYTES # BLD: 0.6 K/UL (ref 0–1)
MONOCYTES NFR BLD: 6 % (ref 5–13)
NEUTS SEG # BLD: 4.6 K/UL (ref 1.8–8)
NEUTS SEG NFR BLD: 53 % (ref 32–75)
NITRITE UR QL STRIP.AUTO: NEGATIVE
NRBC # BLD: 0 K/UL (ref 0–0.01)
NRBC BLD-RTO: 0 PER 100 WBC
PH UR STRIP: 7 [PH] (ref 5–8)
PLATELET # BLD AUTO: 265 K/UL (ref 150–400)
PMV BLD AUTO: 9.8 FL (ref 8.9–12.9)
PROT UR STRIP-MCNC: NEGATIVE MG/DL
RBC # BLD AUTO: 4.37 M/UL (ref 3.8–5.2)
RBC #/AREA URNS HPF: ABNORMAL /HPF (ref 0–5)
SP GR UR REFRACTOMETRY: 1.01 (ref 1–1.03)
UR CULT HOLD, URHOLD: NORMAL
UROBILINOGEN UR QL STRIP.AUTO: 1 EU/DL (ref 0.2–1)
WBC # BLD AUTO: 8.8 K/UL (ref 3.6–11)
WBC URNS QL MICRO: ABNORMAL /HPF (ref 0–4)

## 2019-05-27 PROCEDURE — 85652 RBC SED RATE AUTOMATED: CPT

## 2019-05-27 PROCEDURE — 72128 CT CHEST SPINE W/O DYE: CPT

## 2019-05-27 PROCEDURE — 80053 COMPREHEN METABOLIC PANEL: CPT

## 2019-05-27 PROCEDURE — 93005 ELECTROCARDIOGRAM TRACING: CPT

## 2019-05-27 PROCEDURE — 36415 COLL VENOUS BLD VENIPUNCTURE: CPT

## 2019-05-27 PROCEDURE — 84484 ASSAY OF TROPONIN QUANT: CPT

## 2019-05-27 PROCEDURE — 72125 CT NECK SPINE W/O DYE: CPT

## 2019-05-27 PROCEDURE — 99284 EMERGENCY DEPT VISIT MOD MDM: CPT

## 2019-05-27 PROCEDURE — 83735 ASSAY OF MAGNESIUM: CPT

## 2019-05-27 PROCEDURE — 71046 X-RAY EXAM CHEST 2 VIEWS: CPT

## 2019-05-27 PROCEDURE — 86140 C-REACTIVE PROTEIN: CPT

## 2019-05-27 PROCEDURE — 85025 COMPLETE CBC W/AUTO DIFF WBC: CPT

## 2019-05-27 PROCEDURE — 81001 URINALYSIS AUTO W/SCOPE: CPT

## 2019-05-27 PROCEDURE — 72131 CT LUMBAR SPINE W/O DYE: CPT

## 2019-05-27 RX ORDER — KETOROLAC TROMETHAMINE 30 MG/ML
15 INJECTION, SOLUTION INTRAMUSCULAR; INTRAVENOUS
Status: COMPLETED | OUTPATIENT
Start: 2019-05-27 | End: 2019-05-28

## 2019-05-27 RX ORDER — DIAZEPAM 10 MG/2ML
2 INJECTION INTRAMUSCULAR
Status: COMPLETED | OUTPATIENT
Start: 2019-05-27 | End: 2019-05-28

## 2019-05-28 VITALS
TEMPERATURE: 97.6 F | OXYGEN SATURATION: 97 % | SYSTOLIC BLOOD PRESSURE: 146 MMHG | DIASTOLIC BLOOD PRESSURE: 79 MMHG | RESPIRATION RATE: 18 BRPM | HEART RATE: 79 BPM

## 2019-05-28 LAB
ALBUMIN SERPL-MCNC: 3.6 G/DL (ref 3.5–5)
ALBUMIN/GLOB SERPL: 0.9 {RATIO} (ref 1.1–2.2)
ALP SERPL-CCNC: 90 U/L (ref 45–117)
ALT SERPL-CCNC: 45 U/L (ref 12–78)
ANION GAP SERPL CALC-SCNC: 7 MMOL/L (ref 5–15)
AST SERPL-CCNC: 15 U/L (ref 15–37)
ATRIAL RATE: 77 BPM
BILIRUB SERPL-MCNC: 0.2 MG/DL (ref 0.2–1)
BUN SERPL-MCNC: 13 MG/DL (ref 6–20)
BUN/CREAT SERPL: 16 (ref 12–20)
CALCIUM SERPL-MCNC: 8.8 MG/DL (ref 8.5–10.1)
CALCULATED P AXIS, ECG09: 67 DEGREES
CALCULATED R AXIS, ECG10: 41 DEGREES
CALCULATED T AXIS, ECG11: 8 DEGREES
CHLORIDE SERPL-SCNC: 104 MMOL/L (ref 97–108)
CO2 SERPL-SCNC: 26 MMOL/L (ref 21–32)
CREAT SERPL-MCNC: 0.79 MG/DL (ref 0.55–1.02)
CRP SERPL-MCNC: 1.09 MG/DL (ref 0–0.6)
DIAGNOSIS, 93000: NORMAL
ERYTHROCYTE [SEDIMENTATION RATE] IN BLOOD: 12 MM/HR (ref 0–20)
GLOBULIN SER CALC-MCNC: 4.2 G/DL (ref 2–4)
GLUCOSE SERPL-MCNC: 108 MG/DL (ref 65–100)
MAGNESIUM SERPL-MCNC: 2.3 MG/DL (ref 1.6–2.4)
P-R INTERVAL, ECG05: 152 MS
POTASSIUM SERPL-SCNC: 4.2 MMOL/L (ref 3.5–5.1)
PROT SERPL-MCNC: 7.8 G/DL (ref 6.4–8.2)
Q-T INTERVAL, ECG07: 372 MS
QRS DURATION, ECG06: 78 MS
QTC CALCULATION (BEZET), ECG08: 420 MS
SODIUM SERPL-SCNC: 137 MMOL/L (ref 136–145)
TROPONIN I SERPL-MCNC: <0.05 NG/ML
VENTRICULAR RATE, ECG03: 77 BPM

## 2019-05-28 PROCEDURE — 74011250636 HC RX REV CODE- 250/636: Performed by: PHYSICIAN ASSISTANT

## 2019-05-28 PROCEDURE — 74011000250 HC RX REV CODE- 250: Performed by: PHYSICIAN ASSISTANT

## 2019-05-28 PROCEDURE — 96374 THER/PROPH/DIAG INJ IV PUSH: CPT

## 2019-05-28 PROCEDURE — 96375 TX/PRO/DX INJ NEW DRUG ADDON: CPT

## 2019-05-28 PROCEDURE — 96361 HYDRATE IV INFUSION ADD-ON: CPT

## 2019-05-28 RX ORDER — DIAZEPAM 5 MG/1
5 TABLET ORAL
Qty: 15 TAB | Refills: 0 | Status: SHIPPED | OUTPATIENT
Start: 2019-05-28 | End: 2020-05-26 | Stop reason: SDUPTHER

## 2019-05-28 RX ORDER — LIDOCAINE 50 MG/G
PATCH TOPICAL
Qty: 15 EACH | Refills: 0 | Status: SHIPPED | OUTPATIENT
Start: 2019-05-28

## 2019-05-28 RX ORDER — LIDOCAINE 50 MG/G
PATCH TOPICAL
Status: DISCONTINUED
Start: 2019-05-28 | End: 2019-05-28 | Stop reason: HOSPADM

## 2019-05-28 RX ORDER — LIDOCAINE 50 MG/G
1 PATCH TOPICAL
Status: DISCONTINUED | OUTPATIENT
Start: 2019-05-28 | End: 2019-05-28 | Stop reason: HOSPADM

## 2019-05-28 RX ADMIN — SODIUM CHLORIDE 1000 ML: 900 INJECTION, SOLUTION INTRAVENOUS at 00:27

## 2019-05-28 RX ADMIN — Medication 2 MG: at 00:21

## 2019-05-28 RX ADMIN — KETOROLAC TROMETHAMINE 15 MG: 30 INJECTION, SOLUTION INTRAMUSCULAR at 00:19

## 2019-05-28 NOTE — ED PROVIDER NOTES
36 yo F with hx of HTN, anxiety, chronic pain, and scoliosis here for \"pain all over my body\". Pain from to [facility] to feet\". States she has seen multiple specialist for same over the past 5 years with \"no answers\". Spasms in neck are newest complaint. Taking Robaxin and Hydrocodone (took Hydrocodone in ER waiting room)  Has been evaluated by PCP, Neuro, Ortho and in PT [facility]. States some pain in chest wall today; no exertional CP or diaphoresis. Denies joint swelling, SOB, abd pain, flank pain, urinary symptoms. The history is provided by the patient. Back Pain    Associated symptoms include leg pain. Pertinent negatives include no fever. Neck Pain    Associated symptoms include leg pain. Leg Pain    Associated symptoms include back pain and neck pain.         Past Medical History:   Diagnosis Date    Anxiety     Essential hypertension     chronic pt takes labetolol    Headache     Musculoskeletal disorder     Scoliosis     Snoring        Past Surgical History:   Procedure Laterality Date     DELIVERY ONLY      HX CARPAL TUNNEL RELEASE      HX GYN      HX ORTHOPAEDIC      HX OTHER SURGICAL      left knee surgery     NEUROLOGICAL PROCEDURE UNLISTED      [place] CHEMODERVATE FACIAL/TRIGEM/CERV MUSC MIGRAINE  10/24/2018         Family History:   Problem Relation Age of Onset    Diabetes Mother     Hypertension Mother     Cancer Mother     Neuropathy Mother     Hypertension Father     Cancer Father     Cancer Maternal Grandfather     Stroke Maternal Grandfather     Diabetes Paternal Grandmother     Stroke Paternal Grandmother     Heart Disease Paternal Grandmother     Heart Disease Paternal Uncle        Social History     Socioeconomic History    Marital status:      Spouse name: Not on file    Number of children: Not on file    Years of education: Not on file    Highest education level: Not on file   Occupational History    Not on file Social Needs    Financial resource strain: Not on file    Food insecurity:     Worry: Not on file     Inability: Not on file    Transportation needs:     Medical: Not on file     Non-medical: Not on file   Tobacco Use    Smoking status: Never Smoker    Smokeless tobacco: Never Used   Substance and Sexual Activity    Alcohol use: Yes     Comment: wine    Drug use: No    Sexual activity: Yes     Partners: Male   Lifestyle    Physical activity:     Days per week: Not on file     Minutes per session: Not on file    Stress: Not on file   Relationships    Social connections:     Talks on phone: Not on file     Gets together: Not on file     Attends Shinto service: Not on file     Active member of club or organization: Not on file     Attends meetings of clubs or organizations: Not on file     Relationship status: Not on file    Intimate partner violence:     Fear of current or ex partner: Not on file     Emotionally abused: Not on file     Physically abused: Not on file     Forced sexual activity: Not on file   Other Topics Concern    Not on file   Social History Narrative    Not on file         ALLERGIES: Morphine    Review of Systems   Constitutional: Negative for activity change and fever. HENT: Negative for facial swelling. Eyes: Negative for discharge. Respiratory: Negative for cough. Cardiovascular: Negative for leg swelling. Gastrointestinal: Negative for abdominal distention. Musculoskeletal: Positive for back pain, myalgias and neck pain. Skin: Negative for color change. Neurological: Negative for seizures and syncope. Psychiatric/Behavioral: Negative for behavioral problems. Vitals:    05/27/19 2143   Pulse: (!) 101   SpO2: 98%            Physical Exam   Constitutional: She is oriented to person, place, and time. She appears well-developed and well-nourished. She appears distressed. HENT:   Head: Normocephalic and atraumatic.    Right Ear: External ear normal.   Left Ear: External ear normal.   Nose: Nose normal.   Mouth/Throat: Oropharynx is clear and moist.   Eyes: Pupils are equal, round, and reactive to light. Conjunctivae and EOM are normal. Right eye exhibits no discharge. Left eye exhibits no discharge. Neck: Normal range of motion. Neck supple. Cardiovascular: Normal rate, regular rhythm, normal heart sounds and intact distal pulses. Pulmonary/Chest: Effort normal and breath sounds normal.   Abdominal: Soft. Bowel sounds are normal. She exhibits no distension. There is no tenderness. There is no rebound and no guarding. Musculoskeletal: Normal range of motion. She exhibits no edema. Cervical back: She exhibits tenderness. She exhibits no swelling and no edema. Thoracic back: She exhibits tenderness. She exhibits no swelling and no edema. Lumbar back: She exhibits tenderness. She exhibits no swelling and no edema. Neurological: She is alert and oriented to person, place, and time. She has normal strength. No cranial nerve deficit or sensory deficit. Coordination normal.   Skin: Skin is warm and dry. No rash noted. No joint swelling noted    Psychiatric: She has a normal mood and affect. Her behavior is normal. Judgment and thought content normal.   Nursing note and vitals reviewed. MDM  Number of Diagnoses or Management Options  Neck muscle spasm:   Other chronic pain:      Amount and/or Complexity of Data Reviewed  Clinical lab tests: ordered and reviewed  Tests in the radiology section of CPT®: ordered and reviewed  Discuss the patient with other providers: yes  Independent visualization of images, tracings, or specimens: yes           Procedures    Patient has been reassessed. Feeling better. Reviewed labs, medications and radiographics with patient. Upset there is no answer to her pain but pt aware pain x 5 years with no specific dx; pt aware and agreeable to followup.  VANESSA Betancourt    Discussed case with attending Physician. Agrees with care and will D/C with follow up. Patient's results have been reviewed with them. Patient and/or family have verbally conveyed their understanding and agreement of the patient's signs, symptoms, diagnosis, treatment and prognosis and additionally agree to follow up as recommended or return to the Emergency Room should their condition change prior to follow-up. Discharge instructions have also been provided to the patient with some educational information regarding their diagnosis as well a list of reasons why they would want to return to the ER prior to their follow-up appointment should their condition change.   Demetrice Gaitan, PA

## 2019-05-28 NOTE — DISCHARGE INSTRUCTIONS
Patient Education        Neck Spasm: Care Instructions  Your Care Instructions  A neck spasm is sudden tightness and pain in your neck muscles. A spasm may be caused by some activities or repeated movements. For example, you may be more likely to have a neck spasm if you slouch, paint a ceiling, work at a computer, or sleep with your neck twisted. But the cause isn't always clear. Home treatment includes using heat or ice, taking over-the-counter (OTC) pain medicines, and avoiding activities that may lead to neck pain. Gentle stretching, or treatments such as massage or manipulation, may also help ease a neck spasm. For a neck spasm that doesn't get better with home care, your doctor may prescribe medicine. He or she may also suggest exercise or physical therapy to help strengthen or relax your neck muscles. Follow-up care is a key part of your treatment and safety. Be sure to make and go to all appointments, and call your doctor if you are having problems. It's also a good idea to know your test results and keep a list of the medicines you take. How can you care for yourself at home? · To relieve pain, use heat or ice (whichever feels better) on the affected area. ? Put a warm water bottle, a heating pad set on low, or a warm cloth on your neck. Put a thin cloth between the heating pad and your skin. Do not go to sleep with a heating pad on your skin. ? Try ice or a cold pack on the area for 10 to 20 minutes at a time. Put a thin cloth between the ice and your skin. · Ask your doctor if you can take acetaminophen (such as Tylenol) or nonsteroidal anti-inflammatory drugs, such as ibuprofen or naproxen. Your doctor can prescribe stronger medicines if needed. Be safe with medicines. Read and follow all instructions on the label. · Stretch your muscles every day, especially before and after exercise and at bedtime. Regular stretching can help relax your muscles.   · Try to find a pillow and a position in bed that help improve your night's rest.  · Try to stay active. It's best to start activity slowly. If an exercise makes your pain worse, stop doing it. When should you call for help? Call 911 anytime you think you may need emergency care. For example, call if:    · You are unable to move an arm or a leg at all.   [facility] your doctor now or seek immediate medical care if:    · You have new or worse symptoms in your arms, legs, belly, or buttocks. Symptoms may include:  ? Numbness or tingling. ? Weakness. ? Pain.     · You lose bladder or bowel control.    Watch closely for changes in your health, and be sure to contact your doctor if:    · You do not get better as expected. Where can you learn more? Go to http://sandhya-leroy.info/. Enter C052 in the search box to learn more about \"Neck Spasm: Care Instructions. \"  Current as of: September 20, 2018  Content Version: 11.9  © 9145-3619 [employer]. Care instructions adapted under license by [employer] (which disclaims liability or warranty for this information). If you have questions about a medical condition or this instruction, always ask your healthcare professional. [place] [address] any warranty or liability for your use of this information.

## 2019-05-28 NOTE — ED TRIAGE NOTES
Pt arrives d/t pain throughout her whole body. Pt states pain is shooting from her head to her foot. Pt has been taking muscle relaxers and pain medication without relief. Pt has seen a neurologist and PCP for this unknown issue Pt has been dealing with this issue for 5-6 years.

## 2019-05-29 ENCOUNTER — TELEPHONE (OUTPATIENT)
Dept: NEUROLOGY | Age: 39
End: 2019-05-29

## 2019-05-29 ENCOUNTER — OFFICE VISIT (OUTPATIENT)
Dept: NEUROLOGY | Age: 39
End: 2019-05-29

## 2019-05-29 VITALS
WEIGHT: 233 LBS | BODY MASS INDEX: 38.82 KG/M2 | RESPIRATION RATE: 20 BRPM | HEIGHT: 65 IN | DIASTOLIC BLOOD PRESSURE: 84 MMHG | HEART RATE: 103 BPM | OXYGEN SATURATION: 98 % | SYSTOLIC BLOOD PRESSURE: 128 MMHG

## 2019-05-29 DIAGNOSIS — G43.709 CHRONIC MIGRAINE WITHOUT AURA WITHOUT STATUS MIGRAINOSUS, NOT INTRACTABLE: Primary | ICD-10-CM

## 2019-05-29 NOTE — TELEPHONE ENCOUNTER
Patient has been having extreme neck pain and it is radiating up and down the patient spine. Patient states that this has been happening for the past month and the last two weeks it has increased in intensity. Pain medication and muscle relaxants do not seem to be working. She states that she went to [facility] and they did a CT of the head/neck. She would like you to review it. She has also been to rehab at [facility] for over a year and she states that it does not help. She is going to do some type of water therapy that in the past has helped with the pain. The patient would like to know if you want to get a MRI before her next appt. 7/10/19. She was also wondering if a referral to Neurosurgery would be something that needs to be considered.     Please advise

## 2019-05-29 NOTE — PROGRESS NOTES
Botox Procedure  Pain scale prior to procedure   /10  Pain scale post procedure     /10  Patient states ? 14 headaches a month & lasting  ? 12-24 hrs  Been treated for headaches greater than 6 months  Consent signed     Instructions post injection discussed with patient   1. Do not lay flat for 4 hrs  2. Do not press on injection sites up to 24 hrs.         What to expect  Possible bleeding and/or swelling  at injection sites      Patient verbalizes understanding

## 2019-05-29 NOTE — PROCEDURES
[facility] NEUROLOGY CLINIC [place]  OFFICE PROCEDURE PROGRESS NOTE        Chart reviewed for the following:   Lashon Henderson MD, have reviewed the History, Physical and updated the Allergic reactions for Rossside performed immediately prior to start of procedure:   Lashon Henderson MD, have performed the following reviews on Nadeem Nash prior to the start of the procedure:            * Patient was identified by name and date of birth   * Agreement on procedure being performed was verified  * Risks and Benefits explained to the patient  * Procedure site verified and marked as necessary  * Patient was positioned for comfort  * Consent was signed and verified     Time: 9300  Date of procedure: 5/29/2019  Procedure performed by:  Heladio Van MD  Provider assisted by: None  Patient assisted by: self  How tolerated by patient: tolerated the procedure well with no complications  Comments: None    Botox Injection Note    Indication:   Patient has chronic migraine, and has tried/ failed multiple headache preventive medications (see clinic notes for details). She presents for repeat Botox Injection to reduce overall headache frequency, including migraines. Procedure:    Botox concentration: 200 units in 4 ml of preservative-free normal saline.   31 sites injections, distribution as follow         Units/site Sites Sides subtotal  Procerus     5  1 1 5        5   1 2 10  Frontalis     5  2 2 20  Temporalis    5  4 2 40  Occipitalis    5  3 2 30   Upper cervical paraspinalis  5  2 2 20   Trapezius    5  3 2 30    200 units Botox were reconstituted, 155 units injected as above and the remainder was unavoidably wasted    Patient tolerated procedure well.      _____________________________  Heladio Van M.D.

## 2019-05-30 NOTE — TELEPHONE ENCOUNTER
All the CT's looked good. Nothing surgical to look at but she can see a neurosurgery or orthopedic spine if she wants to be referred for further testing or evaluation.

## 2019-07-10 ENCOUNTER — OFFICE VISIT (OUTPATIENT)
Dept: NEUROLOGY | Age: 39
End: 2019-07-10

## 2019-07-10 VITALS
HEIGHT: 65 IN | OXYGEN SATURATION: 97 % | SYSTOLIC BLOOD PRESSURE: 136 MMHG | BODY MASS INDEX: 38.82 KG/M2 | HEART RATE: 95 BPM | DIASTOLIC BLOOD PRESSURE: 92 MMHG | WEIGHT: 233 LBS

## 2019-07-10 DIAGNOSIS — G43.919 INTRACTABLE MIGRAINE WITHOUT STATUS MIGRAINOSUS, UNSPECIFIED MIGRAINE TYPE: Primary | ICD-10-CM

## 2019-07-10 DIAGNOSIS — M51.36 DEGENERATIVE DISC DISEASE, LUMBAR: ICD-10-CM

## 2019-07-10 DIAGNOSIS — R29.2 HYPERREFLEXIA: ICD-10-CM

## 2019-07-10 DIAGNOSIS — R29.898 WEAKNESS OF BOTH LEGS: ICD-10-CM

## 2019-07-10 DIAGNOSIS — M79.7 FIBROMYALGIA: ICD-10-CM

## 2019-07-10 RX ORDER — PREGABALIN 75 MG/1
75 CAPSULE ORAL 2 TIMES DAILY
Qty: 60 CAP | Refills: 5 | Status: SHIPPED | OUTPATIENT
Start: 2019-07-10 | End: 2019-10-21 | Stop reason: SDUPTHER

## 2019-07-10 NOTE — PROGRESS NOTES
Follow up after botox. Currently has migraine. Lights dimmed to help. Took aimovig on the 3rd this month. Normally takes it on the 9th. Last week of the month is the worst. Cluster headaches return. Not taking gabapentin. Light and noise is the biggest trigger.

## 2019-07-10 NOTE — PROGRESS NOTES
Twila Stanton is a 37 y.o. female who presents with the following  Chief Complaint   Patient presents with    Migraine       HPI       Patient comes in for a follow up for daily, chronic, intractable migraines. Having daily migraines lasting 24 hours and longer. Having a total of 30 migraines a month and 30 days a month with headaches. Has been to the ER a few times due to intractable migraines. She is post botox # 2. Was off a few weeks. It has helped decrease the pain and duration some and she has been happy with this so far. She is currently on BP medication, has tried Lexapro, is on Gabapentin, Topamax with chronic daily migraine but is noticing since increasing to 100 mg her cognitive functioning has slowed some. Wants to try to come back off of this. Imitrex does dull the migraines but is not a full rescue therapy. She is also using Toradol if this does not help.       She has a extensive history of back issues that since April 9th have gotten a lot worse. She states she has been going to [facility] and has seen Dr. Sondra Boyce for an [facility] without relief. She has also had PT multiple occasions, dry needling, manual manipulation and pelvic floor exercises with no real relief. Currently doing PT and water therapy through [facility]. 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 She also states she has a broken tailbone which has never healed. She even went to [facility] a few years back due to this with no real relief. Will attach reports to media file as brought in by patient. Pain and overall functioning has gotten worse recently with today coming into the office a 9/10 pain scale even after hydrocodone through her PCP. Her dosing of this is 5-325 and usually takes 1 tablet a day. Sometimes she needs to take 2 and this is on a bad day. Does not do this all the time. she is having trouble finding someone to manage her pain medications now and pain management is where she wants to get an evaluation to how things can be helped. She is lying on the table today, stretching, in obvious low back pain and leg pain. The lights are dimmed and she just does not feel good. She has used Valium in the past but this does knock her out. Robaxin can help. She was on Gabapentin 300 mg TID at one point and noticed no changes in pain relief and it makes her tired, feeling slower. Never tried Lyrica. Does want a MRI lumbar spine and has been falling more, legs are weak.      She is lying on the table today in visible head pain and can not stand, sit, walk, or lie down for extended periods of time due to discomfort, pain, and radiation from lumbar spine into the legs, mostly on the lateral side of her hips to the backs of her legs and down the leg. She has pain, numbness, tingling, gait instability, balance trouble, falls. She feels pins and needles deep in her buttocks, legs, thighs.  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 Migraines have been worse recently also and she can not work due to this either. Screens, sociailzing make things worse.  With migraines she has nausea, vomiting, dizziness, light and sound sensitivity. She has to wear her sunglasses anywhere she does due to visual disturbance and sensitivity making things worse. Allergies   Allergen Reactions    Corticosteroids (Glucocorticoids) Anxiety    Morphine Itching and Nausea and Vomiting       Current Outpatient Medications   Medication Sig    erenumab-aooe (AIMOVIG AUTOINJECTOR) 140 mg/mL injection 1 mL by SubCUTAneous route every thirty (30) days.  pregabalin (LYRICA) 75 mg capsule Take 1 Cap by mouth two (2) times a day. Max Daily Amount: 150 mg.    erenumab-aooe (AIMOVIG AUTOINJECTOR) 70 mg/mL injection 1 mL by SubCUTAneous route every thirty (30) days.  diazePAM (VALIUM) 5 mg tablet Take 1 Tab by mouth every eight (8) hours as needed (spasm). Max Daily Amount: 15 mg.    lidocaine (LIDODERM) 5 % Apply patch to the affected area for 12 hours a day and remove for 12 hours a day.  DULoxetine (CYMBALTA) 60 mg capsule Take 60 mg by mouth daily.  erenumab-aooe (AIMOVIG AUTOINJECTOR) 70 mg/mL atIn 1 Syringe by SubCUTAneous route every twenty-eight (28) days.  HYDROcodone-acetaminophen (NORCO) 5-325 mg per tablet Take 1 tablet by mouth BID PRN    methocarbamol (ROBAXIN) 500 mg tablet Take 1 Tab by mouth three (3) times daily as needed.  ketorolac (TORADOL) 10 mg tablet Take 10 mg by mouth every six (6) hours as needed.  onabotulinumtoxinA (BOTOX) 200 unit injection INJECT 155 UNITS IN NECK AND FACE INTRAMUSCULARLY AT 31 FDA APPROVED SITES EVERY 12 WEEKS FOR CHRONIC MIGRAINE.  topiramate (TOPAMAX) 100 mg tablet Take 1 tablet by mouth nightly.  lisinopril-hydroCHLOROthiazide (PRINZIDE, ZESTORETIC) 10-12.5 mg per tablet Take  by mouth daily.  levonorgestrel (MIRENA) 20 mcg/24 hr (5 years) IUD 1 Device by IntraUTERine route once. No current facility-administered medications for this visit.         Social History     Tobacco Use   Smoking Status Never Smoker   Smokeless Tobacco Never Used       Past Medical History:   Diagnosis Date    Anxiety     Essential hypertension     chronic pt takes labetolol    Headache     Musculoskeletal disorder     Scoliosis     Snoring        Past Surgical History:   Procedure Laterality Date     DELIVERY ONLY      HX CARPAL TUNNEL RELEASE      HX GYN      HX ORTHOPAEDIC      HX OTHER SURGICAL      left knee surgery     NEUROLOGICAL PROCEDURE UNLISTED      GA CHEMODERVATE FACIAL/TRIGEM/CERV MUSC MIGRAINE  10/24/2018       Family History   Problem Relation Age of Onset    Diabetes Mother     Hypertension Mother     Cancer Mother     Neuropathy Mother     Hypertension Father     Cancer Father     Cancer Maternal Grandfather     Stroke Maternal Grandfather     Diabetes Paternal Grandmother     Stroke Paternal Grandmother     Heart Disease Paternal Grandmother     Heart Disease Paternal Uncle        Social History     Socioeconomic History    Marital status:      Spouse name: Not on file    Number of children: Not on file    Years of education: Not on file    Highest education level: Not on file   Tobacco Use    Smoking status: Never Smoker    Smokeless tobacco: Never Used   Substance and Sexual Activity    Alcohol use: Yes     Comment: wine    Drug use: No    Sexual activity: Yes     Partners: Male       Review of Systems   Eyes: Positive for blurred vision, double vision and photophobia. Respiratory: Negative for shortness of breath and wheezing. Cardiovascular: Negative for chest pain and palpitations. Gastrointestinal: Positive for nausea. Negative for vomiting. Musculoskeletal: Positive for back pain, falls and joint pain. Neurological: Positive for tingling, sensory change and headaches. Negative for dizziness, tremors, seizures and loss of consciousness. Remainder of comprehensive review is negative. Physical Exam :    Visit Vitals  BP (!) 136/92   Pulse 95   Ht 5' 5\" (1.651 m)   Wt 105.7 kg (233 lb)   SpO2 97%   BMI 38.77 kg/m²       General: Well defined, nourished, and groomed individual in no acute distress.    Neck: Supple, nontender, no bruits, no pain with resistance to active range of motion.    Musculoskeletal: Extremities revealed no edema and had full range of motion of joints.    Psych: Good mood and bright affect    NEUROLOGICAL EXAMINATION:    Mental Status: Alert and oriented to person, place, and time    Cranial Nerves:    II, III, IV, VI: not assessed due to patient condition   Extra-ocular movements are full and fluid. Fundoscopic exam was benign, no ptosis or nystagmus.    V-XII: Hearing is grossly intact.  Facial features are symmetric, with normal sensation and strength. The palate rises symmetrically and the tongue protrudes midline. Sternocleidomastoids 5/5. Motor Examination: Normal tone, bulk, and strength, 3/5 muscle strength throughout bilateral LE     Coordination: Finger to nose was normal. No resting or intention tremor    Gait and Station: Steady while walking. Normal arm swing. No pronator drift. No muscle wasting or fasiculations noted. Reflexes: hyperreflexia     Neurosensory Exam:  Stocking glove sensory loss to temperature, vibration, and pinprick to the thighs. Results for orders placed or performed during the hospital encounter of 05/27/19   URINE CULTURE HOLD SAMPLE   Result Value Ref Range    Urine culture hold        URINE ON HOLD IN MICROBIOLOGY DEPT FOR 3 DAYS. IF UNPRESERVED URINE IS SUBMITTED, IT CANNOT BE USED FOR ADDITIONAL TESTING AFTER 24 HRS, RECOLLECTION WILL BE REQUIRED. CBC WITH AUTOMATED DIFF   Result Value Ref Range    WBC 8.8 3.6 - 11.0 K/uL    RBC 4.37 3.80 - 5.20 M/uL    HGB 13.8 11.5 - 16.0 g/dL    HCT 42.1 35.0 - 47.0 %    MCV 96.3 80.0 - 99.0 FL    MCH 31.6 26.0 - 34.0 PG    MCHC 32.8 30.0 - 36.5 g/dL    RDW 14.2 11.5 - 14.5 %    PLATELET 574 175 - 483 K/uL    MPV 9.8 8.9 - 12.9 FL    NRBC 0.0 0  WBC    ABSOLUTE NRBC 0.00 0.00 - 0.01 K/uL    NEUTROPHILS 53 32 - 75 %    LYMPHOCYTES 40 12 - 49 %    MONOCYTES 6 5 - 13 %    EOSINOPHILS 1 0 - 7 %    BASOPHILS 0 0 - 1 %    IMMATURE GRANULOCYTES 0 0.0 - 0.5 %    ABS. NEUTROPHILS 4.6 1.8 - 8.0 K/UL    ABS. LYMPHOCYTES 3.5 0.8 - 3.5 K/UL    ABS. MONOCYTES 0.6 0.0 - 1.0 K/UL    ABS. EOSINOPHILS 0.1 0.0 - 0.4 K/UL    ABS. BASOPHILS 0.0 0.0 - 0.1 K/UL    ABS. IMM.  GRANS. 0.0 0.00 - 0.04 K/UL    DF AUTOMATED     METABOLIC PANEL, COMPREHENSIVE   Result Value Ref Range    Sodium 137 136 - 145 mmol/L    Potassium 4.2 3.5 - 5.1 mmol/L    Chloride 104 97 - 108 mmol/L    CO2 26 21 - 32 mmol/L    Anion gap 7 5 - 15 mmol/L    Glucose 108 (H) 65 - 100 mg/dL    BUN 13 6 - 20 MG/DL    Creatinine 0.79 0.55 - 1.02 MG/DL    BUN/Creatinine ratio 16 12 - 20      GFR est AA >60 >60 ml/min/1.73m2    GFR est non-AA >60 >60 ml/min/1.73m2    Calcium 8.8 8.5 - 10.1 MG/DL    Bilirubin, total 0.2 0.2 - 1.0 MG/DL    ALT (SGPT) 45 12 - 78 U/L    AST (SGOT) 15 15 - 37 U/L    Alk.  phosphatase 90 45 - 117 U/L    Protein, total 7.8 6.4 - 8.2 g/dL    Albumin 3.6 3.5 - 5.0 g/dL    Globulin 4.2 (H) 2.0 - 4.0 g/dL    A-G Ratio 0.9 (L) 1.1 - 2.2     SED RATE (ESR)   Result Value Ref Range    Sed rate, automated 12 0 - 20 mm/hr   C REACTIVE PROTEIN, QT   Result Value Ref Range    C-Reactive protein 1.09 (H) 0.00 - 0.60 mg/dL   URINALYSIS W/MICROSCOPIC   Result Value Ref Range    Color YELLOW/STRAW      Appearance CLEAR CLEAR      Specific gravity 1.015 1.003 - 1.030      pH (UA) 7.0 5.0 - 8.0      Protein NEGATIVE  NEG mg/dL    Glucose NEGATIVE  NEG mg/dL    Ketone NEGATIVE  NEG mg/dL    Bilirubin NEGATIVE  NEG      Blood MODERATE (A) NEG      Urobilinogen 1.0 0.2 - 1.0 EU/dL    Nitrites NEGATIVE  NEG      Leukocyte Esterase NEGATIVE  NEG      WBC 0-4 0 - 4 /hpf    RBC 5-10 0 - 5 /hpf    Epithelial cells FEW FEW /lpf    Bacteria NEGATIVE  NEG /hpf    Hyaline cast 2-5 0 - 5 /lpf   MAGNESIUM   Result Value Ref Range    Magnesium 2.3 1.6 - 2.4 mg/dL   TROPONIN I   Result Value Ref Range    Troponin-I, Qt. <0.05 <0.05 ng/mL   EKG, 12 LEAD, INITIAL   Result Value Ref Range    Ventricular Rate 77 BPM    Atrial Rate 77 BPM    P-R Interval 152 ms    QRS Duration 78 ms    Q-T Interval 372 ms    QTC Calculation (Bezet) 420 ms    Calculated P Axis 67 degrees    Calculated R Axis 41 degrees    Calculated T Axis 8 degrees    Diagnosis       Normal sinus rhythm  No previous ECGs available  Confirmed by Shashi Molina MD, Trey Merritt ([id number]) on 5/28/2019 5:33:48 PM         Orders Placed This Encounter    MRI LUMB SPINE W WO CONT     Standing Status:   Future     Standing Expiration Date:   8/10/2020 Order Specific Question:   Is Patient Allergic to Contrast Dye? Answer:   No     Order Specific Question:   Is Patient Pregnant? Answer:   No     Order Specific Question:   STAT Creatinine as indicated     Answer: Yes    erenumab-aooe (AIMOVIG AUTOINJECTOR) 140 mg/mL injection     Si mL by SubCUTAneous route every thirty (30) days. Dispense:  1 Each     Refill:  5    pregabalin (LYRICA) 75 mg capsule     Sig: Take 1 Cap by mouth two (2) times a day. Max Daily Amount: 150 mg. Dispense:  60 Cap     Refill:  5    erenumab-aooe (AIMOVIG AUTOINJECTOR) 70 mg/mL injection     Si mL by SubCUTAneous route every thirty (30) days. Dispense:  1 Each     Refill:  0       1. Intractable migraine without status migrainosus, unspecified migraine type    2. Fibromyalgia    3. Degenerative disc disease, lumbar    4. Weakness of both legs    5. Hyperreflexia          Migraines are better but has noticed things got worse with lapse of botox X 2 weeks. Keep this for prevention. Keep Aimovig but increase to 140 mg every 30 days as CGRP as different then botox. Try Lyrica 75 mg BID for fibromyalgia and nerve pain. Gabapentin made her feel worse. MRI lumbar spine to look at herniation, radiculopathy, DJD.              This note will not be viewable in [employer]

## 2019-08-29 ENCOUNTER — TELEPHONE (OUTPATIENT)
Dept: NEUROLOGY | Age: 39
End: 2019-08-29

## 2019-09-05 ENCOUNTER — TELEPHONE (OUTPATIENT)
Dept: NEUROLOGY | Age: 39
End: 2019-09-05

## 2019-09-06 NOTE — TELEPHONE ENCOUNTER
----- Message from Elena Barron sent at 9/6/2019  8:56 AM EDT -----  Regarding: Dr. Nellie Roy  Pt returning a missed call. Best contact [phone].

## 2019-09-09 ENCOUNTER — OFFICE VISIT (OUTPATIENT)
Dept: NEUROLOGY | Age: 39
End: 2019-09-09

## 2019-09-09 VITALS
OXYGEN SATURATION: 98 % | BODY MASS INDEX: 38.77 KG/M2 | HEART RATE: 107 BPM | HEIGHT: 65 IN | SYSTOLIC BLOOD PRESSURE: 140 MMHG | RESPIRATION RATE: 20 BRPM | DIASTOLIC BLOOD PRESSURE: 96 MMHG

## 2019-09-09 DIAGNOSIS — M54.17 LUMBOSACRAL RADICULOPATHY: ICD-10-CM

## 2019-09-09 DIAGNOSIS — G43.919 INTRACTABLE MIGRAINE WITHOUT STATUS MIGRAINOSUS, UNSPECIFIED MIGRAINE TYPE: Primary | ICD-10-CM

## 2019-09-09 NOTE — PROCEDURES
[facility] NEUROLOGY CLINIC [place]  OFFICE PROCEDURE PROGRESS NOTE        Chart reviewed for the following:   Roseann He MD, have reviewed the History, Physical and updated the Allergic reactions for Rossside performed immediately prior to start of procedure:   Roseann He MD, have performed the following reviews on Marco Senior prior to the start of the procedure:            * Patient was identified by name and date of birth   * Agreement on procedure being performed was verified  * Risks and Benefits explained to the patient  * Procedure site verified and marked as necessary  * Patient was positioned for comfort  * Consent was signed and verified     Time: 5455  Date of procedure: 9/9/2019  Procedure performed by:  Ricky Sanches MD  Provider assisted by: None  Patient assisted by: self  How tolerated by patient: tolerated the procedure well with no complications  Comments: None    Botox Injection Note    Indication:   Patient has chronic migraine, and has tried/ failed multiple headache preventive medications (see clinic notes for details). She presents for repeat Botox Injection to reduce overall headache frequency, including migraines. Procedure:    Botox concentration: 200 units in 4 ml of preservative-free normal saline.   31 sites injections, distribution as follow         Units/site Sites Sides subtotal  Procerus     5  1 1 5        5   1 2 10  Frontalis     5  2 2 20  Temporalis    5  4 2 40  Occipitalis    5  3 2 30   Upper cervical paraspinalis  5  2 2 20   Trapezius    5  3 2 30    Trapezius    5  2 2 20    Additional Botox was injected in the amounts/ locations above due to myofascial pain in these areas triggering migraine    200 units Botox were reconstituted, 175 units injected as above and the remainder was unavoidably wasted    Patient tolerated procedure well.      _____________________________  Ricky Sanches M.D.

## 2019-09-14 ENCOUNTER — APPOINTMENT (OUTPATIENT)
Dept: CT IMAGING | Age: 39
End: 2019-09-14
Attending: EMERGENCY MEDICINE
Payer: COMMERCIAL

## 2019-09-14 ENCOUNTER — HOSPITAL ENCOUNTER (EMERGENCY)
Age: 39
Discharge: HOME | End: 2019-09-15
Attending: EMERGENCY MEDICINE | Admitting: EMERGENCY MEDICINE
Payer: COMMERCIAL

## 2019-09-14 ENCOUNTER — APPOINTMENT (OUTPATIENT)
Dept: GENERAL RADIOLOGY | Age: 39
End: 2019-09-14
Attending: EMERGENCY MEDICINE
Payer: COMMERCIAL

## 2019-09-14 DIAGNOSIS — R42 DISEQUILIBRIUM: Primary | ICD-10-CM

## 2019-09-14 DIAGNOSIS — G43.809 OTHER MIGRAINE WITHOUT STATUS MIGRAINOSUS, NOT INTRACTABLE: ICD-10-CM

## 2019-09-14 DIAGNOSIS — R53.1 WEAKNESS: ICD-10-CM

## 2019-09-14 LAB
ANION GAP BLD CALC-SCNC: 17 MMOL/L (ref 10–20)
BUN BLD-MCNC: 7 MG/DL (ref 9–20)
CA-I BLD-MCNC: 1.24 MMOL/L (ref 1.12–1.32)
CHLORIDE BLD-SCNC: 100 MMOL/L (ref 98–107)
CO2 BLD-SCNC: 27 MMOL/L (ref 21–32)
COMMENT, HOLDF: NORMAL
CREAT BLD-MCNC: 0.7 MG/DL (ref 0.6–1.3)
GLUCOSE BLD-MCNC: 79 MG/DL (ref 65–100)
HCT VFR BLD CALC: 42 % (ref 35–47)
POTASSIUM BLD-SCNC: 3.9 MMOL/L (ref 3.5–5.1)
SAMPLES BEING HELD,HOLD: NORMAL
SERVICE CMNT-IMP: ABNORMAL
SODIUM BLD-SCNC: 138 MMOL/L (ref 136–145)

## 2019-09-14 PROCEDURE — 74011250636 HC RX REV CODE- 250/636: Performed by: EMERGENCY MEDICINE

## 2019-09-14 PROCEDURE — 93005 ELECTROCARDIOGRAM TRACING: CPT

## 2019-09-14 PROCEDURE — 84703 CHORIONIC GONADOTROPIN ASSAY: CPT

## 2019-09-14 PROCEDURE — 36415 COLL VENOUS BLD VENIPUNCTURE: CPT

## 2019-09-14 PROCEDURE — 80047 BASIC METABLC PNL IONIZED CA: CPT

## 2019-09-14 PROCEDURE — 99285 EMERGENCY DEPT VISIT HI MDM: CPT

## 2019-09-14 PROCEDURE — 84484 ASSAY OF TROPONIN QUANT: CPT

## 2019-09-14 PROCEDURE — 71045 X-RAY EXAM CHEST 1 VIEW: CPT

## 2019-09-14 PROCEDURE — 96374 THER/PROPH/DIAG INJ IV PUSH: CPT

## 2019-09-14 RX ORDER — KETOROLAC TROMETHAMINE 30 MG/ML
15 INJECTION, SOLUTION INTRAMUSCULAR; INTRAVENOUS
Status: COMPLETED | OUTPATIENT
Start: 2019-09-14 | End: 2019-09-14

## 2019-09-14 RX ORDER — MECLIZINE HCL 12.5 MG 12.5 MG/1
25 TABLET ORAL
Status: COMPLETED | OUTPATIENT
Start: 2019-09-14 | End: 2019-09-14

## 2019-09-14 RX ORDER — SODIUM CHLORIDE 0.9 % (FLUSH) 0.9 %
10 SYRINGE (ML) INJECTION
Status: COMPLETED | OUTPATIENT
Start: 2019-09-14 | End: 2019-09-15

## 2019-09-14 RX ADMIN — MECLIZINE 25 MG: 12.5 TABLET ORAL at 23:14

## 2019-09-14 RX ADMIN — KETOROLAC TROMETHAMINE 15 MG: 30 INJECTION, SOLUTION INTRAMUSCULAR at 23:20

## 2019-09-15 ENCOUNTER — APPOINTMENT (OUTPATIENT)
Dept: CT IMAGING | Age: 39
End: 2019-09-15
Attending: EMERGENCY MEDICINE
Payer: COMMERCIAL

## 2019-09-15 VITALS
HEART RATE: 82 BPM | OXYGEN SATURATION: 98 % | DIASTOLIC BLOOD PRESSURE: 78 MMHG | SYSTOLIC BLOOD PRESSURE: 118 MMHG | TEMPERATURE: 98.4 F | RESPIRATION RATE: 16 BRPM

## 2019-09-15 LAB
ATRIAL RATE: 91 BPM
CALCULATED P AXIS, ECG09: 65 DEGREES
CALCULATED R AXIS, ECG10: 46 DEGREES
CALCULATED T AXIS, ECG11: 18 DEGREES
DIAGNOSIS, 93000: NORMAL
HCG SERPL QL: NEGATIVE
P-R INTERVAL, ECG05: 150 MS
Q-T INTERVAL, ECG07: 360 MS
QRS DURATION, ECG06: 78 MS
QTC CALCULATION (BEZET), ECG08: 442 MS
TROPONIN I SERPL-MCNC: <0.05 NG/ML
VENTRICULAR RATE, ECG03: 91 BPM

## 2019-09-15 PROCEDURE — 70496 CT ANGIOGRAPHY HEAD: CPT

## 2019-09-15 PROCEDURE — 74011000258 HC RX REV CODE- 258: Performed by: RADIOLOGY

## 2019-09-15 PROCEDURE — 70450 CT HEAD/BRAIN W/O DYE: CPT

## 2019-09-15 PROCEDURE — 74011250636 HC RX REV CODE- 250/636: Performed by: EMERGENCY MEDICINE

## 2019-09-15 PROCEDURE — 70498 CT ANGIOGRAPHY NECK: CPT

## 2019-09-15 PROCEDURE — 74011636320 HC RX REV CODE- 636/320: Performed by: RADIOLOGY

## 2019-09-15 RX ORDER — MECLIZINE HYDROCHLORIDE 25 MG/1
25 TABLET ORAL
Qty: 20 TAB | Refills: 0 | Status: SHIPPED | OUTPATIENT
Start: 2019-09-15 | End: 2019-12-25

## 2019-09-15 RX ORDER — SILVER NITRATE 38.21; 12.74 MG/1; MG/1
STICK TOPICAL
Status: DISCONTINUED
Start: 2019-09-15 | End: 2019-09-15 | Stop reason: HOSPADM

## 2019-09-15 RX ADMIN — SODIUM CHLORIDE 100 ML: 900 INJECTION, SOLUTION INTRAVENOUS at 01:07

## 2019-09-15 RX ADMIN — IOPAMIDOL 100 ML: 755 INJECTION, SOLUTION INTRAVENOUS at 01:07

## 2019-09-15 RX ADMIN — Medication 10 ML: at 01:07

## 2019-09-15 RX ADMIN — SODIUM CHLORIDE 1000 ML: 900 INJECTION, SOLUTION INTRAVENOUS at 00:47

## 2019-09-15 RX ADMIN — SODIUM CHLORIDE 1000 ML: 900 INJECTION, SOLUTION INTRAVENOUS at 00:46

## 2019-09-15 NOTE — DISCHARGE INSTRUCTIONS

## 2019-09-15 NOTE — ED NOTES
Bedside and Verbal shift change report given to Robert Everett RN (oncoming nurse) by Henrine Gaucher, RN (offgoing nurse). Report included the following information SBAR, ED Summary, MAR and Recent Results.

## 2019-09-15 NOTE — ED PROVIDER NOTES
This patient comes in with a chief complaint of lightheadedness and disequilibrium. The lightheadedness began yesterday. Earlier this morning, she started having some disequilibrium. There were no symptoms of vertigo. This started while riding in a car. She gets motion sickness while riding in a car very frequently. She is a chronic pain patient. She has migraine headaches on the right side of her head every day. She currently is having a migraine and it does not feel like anything else. She takes Aimovig for her migraines as well as Botox injections. She had both this week. No fever or chills. No diplopia or blurry vision. Lights, sounds and smells make her head worse. Headache is not the real reason that she is here though. Yesterday, she had a mild sore throat but that has resolved. She felt unwell for most of the day. She also had the lightheadedness. Appetite was normal.  No focal numbness or weakness. No issues with visual disturbance, tinnitus or hearing loss. In addition, all day long she has had some soreness to her midsternal area of the chest.  This goes to the left shoulder. It is worse whenever she presses on her own chest.  No shortness of breath. She tried no medication for her other symptoms that brought her here. She also has a history of hypertension. She takes a low-dose lisinopril once per day, she thinks 10 mg. She is compliant with that. She takes no blood pressure medicine at night. For her headache right now, she typically would take Toradol at home. She is quite concerned about her blood pressure. Usually her systolic is 075 or below. Yesterday while at gynecology getting her annual exam, she had a systolic of 514. These elevated blood pressures occurred today too. That concerns her as well. No abdominal pain. No leg swelling. No other issues right now.         She also tells me that 2 weeks ago, she had some lightheadedness and disequilibrium symptoms similar to today but much milder. She went to [facility], had some testing done and was sent home. She did have what sounds to be a noncontrast head CT. Old chart reviewed. Last ED visit was May of this year.   It was for chronic pain and muscle spasm           Past Medical History:   Diagnosis Date    Anxiety     Essential hypertension     chronic pt takes labetolol    Headache     Musculoskeletal disorder     Scoliosis     Snoring        Past Surgical History:   Procedure Laterality Date     DELIVERY ONLY      HX CARPAL TUNNEL RELEASE      HX GYN      HX ORTHOPAEDIC      HX OTHER SURGICAL      left knee surgery     NEUROLOGICAL PROCEDURE UNLISTED      LA CHEMODERVATE FACIAL/TRIGEM/CERV MUSC MIGRAINE  10/24/2018         Family History:   Problem Relation Age of Onset    Diabetes Mother     Hypertension Mother     Cancer Mother     Neuropathy Mother     Hypertension Father     Cancer Father     Cancer Maternal Grandfather     Stroke Maternal Grandfather     Diabetes Paternal Grandmother     Stroke Paternal Grandmother     Heart Disease Paternal Grandmother     Heart Disease Paternal Uncle        Social History     Socioeconomic History    Marital status:      Spouse name: Not on file    Number of children: Not on file    Years of education: Not on file    Highest education level: Not on file   Occupational History    Not on file   Social Needs    Financial resource strain: Not on file    Food insecurity:     Worry: Not on file     Inability: Not on file    Transportation needs:     Medical: Not on file     Non-medical: Not on file   Tobacco Use    Smoking status: Never Smoker    Smokeless tobacco: Never Used   Substance and Sexual Activity    Alcohol use: Yes     Comment: wine    Drug use: No    Sexual activity: Yes     Partners: Male   Lifestyle    Physical activity:     Days per week: Not on file     Minutes per session: Not on file    Stress: Not on file   Relationships    Social connections:     Talks on phone: Not on file     Gets together: Not on file     Attends Jehov[employer]'s witness service: Not on file     Active member of club or organization: Not on file     Attends meetings of clubs or organizations: Not on file     Relationship status: Not on file    Intimate partner violence:     Fear of current or ex partner: Not on file     Emotionally abused: Not on file     Physically abused: Not on file     Forced sexual activity: Not on file   Other Topics Concern    Not on file   Social History Narrative    Not on file         ALLERGIES: Corticosteroids (glucocorticoids) and Morphine    Review of Systems   All other systems reviewed and are negative. Vitals:    09/14/19 2207   Pulse: (!) 112   SpO2: 98%            Physical Exam   Constitutional: She appears well-developed and well-nourished. No distress. Looks well. Does not appear to be in pain. Is wearing sunglasses. She is in a dark room upon me entering. HENT:   Head: Normocephalic. Right Ear: External ear normal.   Left Ear: External ear normal.   Nose: Nose normal.   Mouth/Throat: Oropharynx is clear and moist.   TMs clear bilaterally. Canals are clear. Eyes: Pupils are equal, round, and reactive to light. Conjunctivae and EOM are normal.   Neck: No tracheal deviation present. Cardiovascular: Normal rate, regular rhythm and intact distal pulses. Pulmonary/Chest: Effort normal and breath sounds normal.   Abdominal: Soft. She exhibits no distension. There is no tenderness. There is no guarding. Musculoskeletal: She exhibits no edema. Neurological: She is alert. Symmetric face. No sensory deficit. No aphasia or dysarthria. No pronator or leg drift. Skin: Skin is warm and dry. She is not diaphoretic. Psychiatric: She has a normal mood and affect. MDM       Procedures        ED EKG interpretation:  Rhythm: normal sinus rhythm; and regular .  Rate (approx.): 91; Axis: normal; P wave: normal; QRS interval: normal ; ST/T wave: normal; This EKG was interpreted by Mari Pablo DO,ED Provider. 10:43 PM -we will try some Toradol for her headache that is not really why she is here. I think she is likely having peripheral disequilibrium and I doubt a central process. That being said we are going to go ahead with a head CT and a CTA of the head/neck. I highly doubt a strokelike process today. We will check an EKG and labs as well since she has chest pain. Chest x-ray pending as well. We will give her meclizine for this disequilibrium and see how she does with that. 12:42 AM -sleeping. She still feels quite off kilter whenever she stands up. Her headache is gone. She did not eat much today because of nausea. She drank water and coffee but when she thinks about it, she probably did not drink enough fluids. Will give 2 L of saline here. May be this is orthostasis. 3:08 AM -sleeping. Reviewed CT images. Awaiting CTA report.      4:06 AM -she slept most of the visit here. She got 2 L of saline. She does feel better. I doubt a central process. She can follow-up with neurology. We are going to try a brief course of meclizine as needed and see if that works.     Labs Reviewed   POC CHEM8 - Abnormal; Notable for the following components:       Result Value    BUN (POC) 7 (*)     All other components within normal limits   SAMPLES BEING HELD   TROPONIN I   HCG QL SERUM   POC CHEM8

## 2019-09-15 NOTE — ED NOTES
Triage: Pt arrives from home with CC of hypertension and headaches. Pt reports she has hypertension and has been taking her lisinopril. She also reports chest pain when she pushes on her chest and feeling generally unwell.

## 2019-09-20 ENCOUNTER — TELEPHONE (OUTPATIENT)
Dept: NEUROLOGY | Age: 39
End: 2019-09-20

## 2019-09-20 RX ORDER — DIHYDROERGOTAMINE MESYLATE 4 MG/ML
SPRAY NASAL
Qty: 1 BOTTLE | Refills: 5 | Status: SHIPPED | OUTPATIENT
Start: 2019-09-20 | End: 2022-06-18

## 2019-09-23 RX ORDER — DEXAMETHASONE 2 MG/1
TABLET ORAL
Qty: 18 TAB | Refills: 0 | Status: SHIPPED | OUTPATIENT
Start: 2019-09-23 | End: 2019-10-21

## 2019-09-23 NOTE — TELEPHONE ENCOUNTER
R/t call to patient. She did get the prescription. The Migranal did help. States botox and aimovig normally helped when using together but this was different. Lasted 3 days total. Still having pressure and pain on right side though. She states she cannot drive because she gets nauseous. She is back to wearing ear plugs and sleep most the day. She did get the letter we wrote, but states that it needs to be more specific as to what her limitations are. She would like to send us an example done by pain clinic. Informed her to sign up for [employer] because she can attach it to a message. When she started the aimovig, dr. Benito eDsai suggested coming off botox. She wants to know if she should delay the botox for a couple treatments to see if aimovig will control by it self. She is worried because botox reduced neck strain.      Last ER visit at [address] [place] [address] and they did scans she would like you to look at

## 2019-09-24 NOTE — TELEPHONE ENCOUNTER
Prior Auth submitted for Generic Migranal to [facility] via Cover My Med[employer]. Status Pending. Allow 24-72 hours for response.     CM Key: [id number]

## 2019-10-01 NOTE — TELEPHONE ENCOUNTER
Attempted to contact. Left detailed message with NP's response. And reminded her to sign up for mychart.

## 2019-10-11 DIAGNOSIS — M54.17 LUMBOSACRAL RADICULOPATHY: ICD-10-CM

## 2019-10-11 RX ORDER — KETOROLAC TROMETHAMINE 10 MG/1
10 TABLET, FILM COATED ORAL
Qty: 25 TAB | Refills: 4 | Status: SHIPPED | OUTPATIENT
Start: 2019-10-11

## 2019-10-21 ENCOUNTER — OFFICE VISIT (OUTPATIENT)
Dept: NEUROLOGY | Age: 39
End: 2019-10-21

## 2019-10-21 VITALS
WEIGHT: 233 LBS | DIASTOLIC BLOOD PRESSURE: 88 MMHG | HEIGHT: 65 IN | HEART RATE: 92 BPM | BODY MASS INDEX: 38.82 KG/M2 | OXYGEN SATURATION: 98 % | SYSTOLIC BLOOD PRESSURE: 116 MMHG

## 2019-10-21 DIAGNOSIS — M54.2 NECK PAIN: ICD-10-CM

## 2019-10-21 DIAGNOSIS — M54.2 NECK PAIN: Primary | ICD-10-CM

## 2019-10-21 DIAGNOSIS — M51.36 LUMBAR DEGENERATIVE DISC DISEASE: ICD-10-CM

## 2019-10-21 DIAGNOSIS — G43.919 INTRACTABLE MIGRAINE WITHOUT STATUS MIGRAINOSUS, UNSPECIFIED MIGRAINE TYPE: ICD-10-CM

## 2019-10-21 DIAGNOSIS — M79.7 FIBROMYALGIA: ICD-10-CM

## 2019-10-21 RX ORDER — PREGABALIN 75 MG/1
75 CAPSULE ORAL 2 TIMES DAILY
Qty: 60 CAP | Refills: 5 | Status: SHIPPED | OUTPATIENT
Start: 2019-10-21 | End: 2020-05-26 | Stop reason: DRUGHIGH

## 2019-10-21 NOTE — PROGRESS NOTES
Doing better since mirena was removed. Less pressure. Still has sensitivity to light and sound, but overall doing better.

## 2019-10-21 NOTE — PROGRESS NOTES
Estela Odom is a 37 y.o. female who presents with the following  Chief Complaint   Patient presents with    Migraine       HPI     Patient comes in for a follow up for migraines, low back pain, fibromyalgia. Things are good and bad at times. Today she is feeling a migraine in office. She has noticed they come and go. The Botox and Aimovig seem to be doing well together. She does want to try just the Aimovig now that the Mirena has been taken out. This was dislodged and she thinks it was causing some of her back pain. Since taking out the migraines have slowed down some and her back pain is not as stabbing. She no longer has the daily, chronic pressure behind the eyes. She does still get light sensitivity. She just went to a wedding and needed to wear ear plugs. She has nausea, dizziness and memory fog still. She has to write down more things then normal. She will still sometimes get cluster migraines. Getting PT at [facility] and this is not really helping. Water therapy has been helping. Also doing daily stretching. Her neck is painful. Especially on the right side and into the right shoulder and arm. She has never had an EMG but feels pain with flexion, extension and side to side. She has been using Robaxin TID for spasms, pain and this is helping. She has noticed this can help decrease day to day symptoms. Does not want to increase yet. She does want to get an EMG   States her left leg is also very weak and sometimes will give out. Will get an EMG of this also. Memory is up and down still. Depending on the day, stressors ,etc.   Has been to the ED a few times due to migraine recently. Isabel Jaffe has helped. Allergies   Allergen Reactions    Corticosteroids (Glucocorticoids) Anxiety    Morphine Itching and Nausea and Vomiting       Current Outpatient Medications   Medication Sig    pregabalin (LYRICA) 75 mg capsule Take 1 Cap by mouth two (2) times a day.  Max Daily Amount: 150 mg.    erenumab-aooe (AIMOVIG AUTOINJECTOR) 140 mg/mL injection 1 mL by SubCUTAneous route every thirty (30) days.  ketorolac (TORADOL) 10 mg tablet Take 1 Tab by mouth every six (6) hours as needed for Pain.  dihydroergotamine (MIGRANAL) 0.5 mg/pump act. (4 mg/mL) nasal spray 1 spray in both nostrils at HA onset. May repeat again in 15 minutes. May repeat X 1 dose. Max 2 doses in 24 hours.  meclizine (ANTIVERT) 25 mg tablet Take 1 Tab by mouth three (3) times daily as needed for Dizziness.  onabotulinumtoxinA (BOTOX) 200 unit injection INJECT 155 UNITS IN NECK AND FACE INTRAMUSCULARLY AT 31 FDA APPROVED SITES EVERY 12 WEEKS FOR CHRONIC MIGRAINE.  diazePAM (VALIUM) 5 mg tablet Take 1 Tab by mouth every eight (8) hours as needed (spasm). Max Daily Amount: 15 mg.    lidocaine (LIDODERM) 5 % Apply patch to the affected area for 12 hours a day and remove for 12 hours a day.  DULoxetine (CYMBALTA) 60 mg capsule Take 60 mg by mouth daily.  HYDROcodone-acetaminophen (NORCO) 5-325 mg per tablet Take 1 tablet by mouth BID PRN    methocarbamol (ROBAXIN) 500 mg tablet Take 1 Tab by mouth three (3) times daily as needed.  topiramate (TOPAMAX) 100 mg tablet Take 1 tablet by mouth nightly.  lisinopril-hydroCHLOROthiazide (PRINZIDE, ZESTORETIC) 10-12.5 mg per tablet Take  by mouth daily. No current facility-administered medications for this visit.         Social History     Tobacco Use   Smoking Status Never Smoker   Smokeless Tobacco Never Used       Past Medical History:   Diagnosis Date    Anxiety     Essential hypertension     chronic pt takes labetolol    Headache     Musculoskeletal disorder     Scoliosis     Snoring        Past Surgical History:   Procedure Laterality Date     DELIVERY ONLY      HX CARPAL TUNNEL RELEASE      HX GYN      HX ORTHOPAEDIC      HX OTHER SURGICAL      left knee surgery 2007    NEUROLOGICAL PROCEDURE UNLISTED      [place] CHEMODERVATE FACIAL/TRIGEM/CERV MUSC MIGRAINE  10/24/2018       Family History   Problem Relation Age of Onset    Diabetes Mother     Hypertension Mother     Cancer Mother     Neuropathy Mother     Hypertension Father     Cancer Father     Cancer Maternal Grandfather     Stroke Maternal Grandfather     Diabetes Paternal Grandmother     Stroke Paternal Grandmother     Heart Disease Paternal Grandmother     Heart Disease Paternal Uncle        Social History     Socioeconomic History    Marital status:      Spouse name: Not on file    Number of children: Not on file    Years of education: Not on file    Highest education level: Not on file   Tobacco Use    Smoking status: Never Smoker    Smokeless tobacco: Never Used   Substance and Sexual Activity    Alcohol use: Yes     Comment: wine    Drug use: No    Sexual activity: Yes     Partners: Male       Review of Systems   Eyes: Positive for blurred vision, double vision and photophobia. Respiratory: Negative for shortness of breath and wheezing. Cardiovascular: Negative for chest pain and palpitations. Gastrointestinal: Positive for nausea and vomiting. Musculoskeletal: Positive for neck pain. Neurological: Positive for dizziness, tingling, sensory change and headaches. Negative for seizures and loss of consciousness. Remainder of comprehensive review is negative. Physical Exam :    Visit Vitals  /88   Pulse 92   Ht 5' 5\" (1.651 m)   Wt 105.7 kg (233 lb)   SpO2 98%   BMI 38.77 kg/m²       General: Well defined, nourished, and groomed individual in no acute distress.    Neck: Supple, nontender, no bruits, no pain with resistance to active range of motion.    Heart: Regular rate and rhythm, no murmurs, rub, or gallop. Normal S1S2.   Lungs: Clear to auscultation bilaterally with equal chest expansion, no cough, no wheeze  Musculoskeletal: Extremities revealed no edema and had full range of motion of joints.    Psych: Good mood and bright affect    NEUROLOGICAL EXAMINATION:    Mental Status: Alert and oriented to person, place, and time    Cranial Nerves:    II, III, IV, VI: Visual acuity grossly intact. Visual fields are normal.    Pupils are equal, round, and reactive to light and accommodation.    Extra-ocular movements are full and fluid. Fundoscopic exam was benign, no ptosis or nystagmus.    V-XII: Hearing is grossly intact. Facial features are symmetric, with normal sensation and strength. The palate rises symmetrically and the tongue protrudes midline. Sternocleidomastoids 5/5. Motor Examination: Normal tone, bulk, and strength, 4/5 muscle strength throughout. Coordination: Finger to nose was normal. No resting or intention tremor    Gait and Station: Steady while walking. Normal arm swing. No pronator drift. No muscle wasting or fasiculations noted. Reflexes: DTRs 1+ throughout. Neurosensory Exam:  Stocking glove sensory loss to temperature, vibration, and pinprick to the thighs. Results for orders placed or performed during the hospital encounter of 09/14/19   SAMPLES BEING HELD   Result Value Ref Range    SAMPLES BEING HELD 1BLUE 1LAV 1RED     COMMENT        Add-on orders for these samples will be processed based on acceptable specimen integrity and analyte stability, which may vary by analyte.    TROPONIN I   Result Value Ref Range    Troponin-I, Qt. <0.05 <0.05 ng/mL   HCG QL SERUM   Result Value Ref Range    HCG, Ql. NEGATIVE  NEG     POC CHEM8   Result Value Ref Range    Calcium, ionized (POC) 1.24 1.12 - 1.32 mmol/L    Sodium (POC) 138 136 - 145 mmol/L    Potassium (POC) 3.9 3.5 - 5.1 mmol/L    Chloride (POC) 100 98 - 107 mmol/L    CO2 (POC) 27 21 - 32 mmol/L    Anion gap (POC) 17 10 - 20 mmol/L    Glucose (POC) 79 65 - 100 mg/dL    BUN (POC) 7 (L) 9 - 20 mg/dL    Creatinine (POC) 0.7 0.6 - 1.3 mg/dL    GFRAA, POC >60 >60 ml/min/1.73m2    GFRNA, POC >60 >60 ml/min/1.73m2    Hematocrit (POC) 42 35.0 - 47.0 %    Comment Comment Not Indicated. EKG, 12 LEAD, INITIAL   Result Value Ref Range    Ventricular Rate 91 BPM    Atrial Rate 91 BPM    P-R Interval 150 ms    QRS Duration 78 ms    Q-T Interval 360 ms    QTC Calculation (Bezet) 442 ms    Calculated P Axis 65 degrees    Calculated R Axis 46 degrees    Calculated T Axis 18 degrees    Diagnosis       Normal sinus rhythm with sinus arrhythmia  When compared with ECG of 27-MAY-2019 23:30,  No significant change was found  Confirmed by Irma Garber ([id number]) on 9/15/2019 4:17:17 PM         Orders Placed This Encounter    EMG LIMITED     Both arms     Standing Status:   Future     Standing Expiration Date:   2020     Order Specific Question:   Reason for Exam:     Answer:   neck pain.  pregabalin (LYRICA) 75 mg capsule     Sig: Take 1 Cap by mouth two (2) times a day. Max Daily Amount: 150 mg. Dispense:  60 Cap     Refill:  5    erenumab-aooe (AIMOVIG AUTOINJECTOR) 140 mg/mL injection     Si mL by SubCUTAneous route every thirty (30) days. Dispense:  1 Each     Refill:  5       1. Neck pain    2. Chronic migraine    3. Lumbar degenerative disc disease    4. Fibromyalgia    5. Intractable migraine without status migrainosus, unspecified migraine type        multiple symptoms. Discussed treatment plan. Keep with [facility] and rehab. Will write a note for no work. Keep Leonel Tannerhattie for now. She wants to stop Botox for a while to see how the IUD was causing migraine. Keep with currently rescues. Lyrica for nerve pain and fibromyalgia at 75 mg BID. We discussed side effects. EMG both arms and leg to look at radiculopathy vs other causes of her pain and discomfort. FU after.              This note will not be viewable in [employer]

## 2019-11-07 ENCOUNTER — OFFICE VISIT (OUTPATIENT)
Dept: NEUROLOGY | Age: 39
End: 2019-11-07

## 2019-11-07 DIAGNOSIS — M79.609 PAIN IN EXTREMITY, UNSPECIFIED EXTREMITY: Primary | ICD-10-CM

## 2019-11-07 NOTE — PROCEDURES
EMG/ NCS Report  DR[facility]  - [facility]  [facility]  [facility], [address] [place], [address]   Ph: [phone]   FAX: [phone]/ [phone]  Test Date:  2019    Patient: Jake Odom : 1980 Physician: Migue Mercer MD   Sex: Female Height: ' \" Ref Jon Allred   ID#: [id number] Weight:  lbs. Technician: Billy Chase     Patient History / Exam:  Patient has chronic generalized pain with pain in both arms and legs. (+) neck pain (+) lower back pain (+) intermittent numbness in both hands. (+) fibromyalgia. Patient is coming for neuropathy evaluation. EMG & NCV Findings:  Evaluation of the right Fibular motor nerve showed normal distal onset latency (3.5 ms), normal amplitude (7.3 mV), normal conduction velocity (B Fib-Ankle, 47 m/s), and normal conduction velocity (Poplt-B Fib, 45 m/s). The left median motor and the right median motor nerves showed normal distal onset latency (L3.8, R4.4 ms), normal amplitude (L5.7, R4.9 mV), and normal conduction velocity (Elbow-Wrist, L51, R53 m/s). The right tibial motor nerve showed normal distal onset latency (3.8 ms), normal amplitude (7.6 mV), and normal conduction velocity (Knee-Ankle, 49 m/s). The left ulnar motor and the right ulnar motor nerves showed normal distal onset latency (L2.5, R2.6 ms), normal amplitude (L11.1, R10.8 mV), normal conduction velocity (B Elbow-Wrist, L56, R59 m/s), and normal conduction velocity (A Elbow-B Elbow, L67, R53 m/s). The left median sensory, the right median sensory, the left radial sensory, the right radial sensory, the right sural sensory, the left ulnar sensory, and the right ulnar sensory nerves showed normal distal peak latency (L3.3, R3.8, L1.9, R1.7, R2.9, L3.1, R3.3 ms) and normal amplitude (L45.5, R17.5, L68.4, R84.5, R17.6, L35.3, R28.3 µV).   The right Sup Fibular sensory nerve showed normal distal peak latency (2.5 ms), normal amplitude (16.5 µV), and normal conduction velocity (Lower leg-Lat ankle, 53 m/s). The left median/ulnar (palm) comparison and the right median/ulnar (palm) comparison nerves showed normal distal onset latency (Median Palm, L1.7, R2.0 ms), prolonged distal peak latency (Median Palm, L2.3, R2.4 ms), normal amplitude (Median Palm, L23.7, R21.2 µV), normal distal onset latency (Ulnar Palm, L1.0, R1.2 ms), normal distal peak latency (Ulnar Palm, L1.6, R1.8 ms), and normal amplitude (Ulnar Palm, L16.5, R9.4 µV). All left vs. right side differences were within normal limits. All F Wave latencies were within normal limits. All F Wave left vs. right side latency differences were within normal limits. All examined muscles (as indicated in the following table) showed no evidence of electrical instability. Impression:  ABNORMAL    Extensive electrodiagnostic examination of both upper and right lower extremities shows evidence of bilateral median neuropathies at or distal to the wrist, consistent with a clinical diagnosis of carpal tunnel syndrome, both minimal in degree electrically. There is no evidence of a cervical motor radiculopathy. Electrical findings of the right lower extremity is within normal limits.           Indra Jerry MD  Diplomate, American Board of Psychiatry and Neurology  Diplomate, Neuromuscular Medicine  Diplomate, American Board of Electrodiagnostic Medicine  Director, [address] [place] [address] Accredited Laboratory with Exemplary Status          Nerve Conduction Studies  Anti Sensory Summary Table     Stim Site NR Peak (ms) Norm Peak (ms) P-T Amp (µV) Norm P-T Amp Site1 Site2 Dist (cm)   Left Median Anti Sensory (2nd Digit)  29.9°C   Wrist    3.3 <4 45.5 >13 Wrist 2nd Digit 14.0   Right Median Anti Sensory (2nd Digit)  31.5°C   Wrist    3.8 <4 17.5 >13 Wrist 2nd Digit 14.0   Left Radial Anti Sensory (Base 1st Digit)  31.4°C   Wrist    1.9 <2.8 68.4 >11 Wrist Base 1st Digit 10.0   Right Radial Anti Sensory (Base 1st Digit)  31.4°C   Wrist    1.7 <2.8 84.5 >11 Wrist Base 1st Digit 10.0   Right Sup Fibular Anti Sensory (Lat ankle)  32.5°C   Lower leg    2.5 <4.5 16.5 >5 Lower leg Lat ankle 10.0   Right Sural Anti Sensory (Lat Mall)  31.3°C   Calf    2.9 <4.5 17.6 >4.0 Calf Lat Mall 14.0   Left Ulnar Anti Sensory (5th Digit)  30.8°C   Wrist    3.1 <4.0 35.3 >9 Wrist 5th Digit 14.0   Right Ulnar Anti Sensory (5th Digit)  30.9°C   Wrist    3.3 <4.0 28.3 >9 Wrist 5th Digit 14.0     Motor Summary Table     Stim Site NR Onset (ms) Norm Onset (ms) O-P Amp (mV) Norm O-P Amp Amp (Prev) (%) Site1 Site2 Dist (cm) Marky (m/s) Norm Marky (m/s)   Right Fibular Motor (Ext Dig Brev)  23.1°C   Ankle    3.5 <6.5 7.3 >2.6 100.0 Ankle Ext Dig Brev 8.0     B Fib    9.9  7.2  98.6 B Fib Ankle 30.0 47 >38   Poplt    12.1  6.7  93.1 Poplt B Fib 10.0 45 >42   Left Median Motor (Abd Poll Brev)  31.8°C   Wrist    3.8 <4.5 5.7 >4.1 100.0 Wrist Abd Poll Brev 8.0     Elbow    7.7  4.9  86.0 Elbow Wrist 20.0 51 >49   Right Median Motor (Abd Poll Brev)  31.2°C   Wrist    4.4 <4.5 4.9 >4.1 100.0 Wrist Abd Poll Brev 8.0     Elbow    8.4  3.5  71.4 Elbow Wrist 21.0 53 >49   Right Tibial Motor (Abd Prince Brev)  32.8°C   Ankle    3.8 <6.1 7.6 >5.3 100.0 Ankle Abd Prince Brev 8.0     Knee    11.3  7.3  96.1 Knee Ankle 37.0 49 >39   Left Ulnar Motor (Abd Dig Minimi)  32.1°C   Wrist    2.5 <3.1 11.1 >7.0 100.0 Wrist Abd Dig Minimi 8.0  >50   B Elbow    6.6  9.7  87.4 B Elbow Wrist 23.0 56 >50   A Elbow    8.1  9.4  96.9 A Elbow B Elbow 10.0 67 >50   Right Ulnar Motor (Abd Dig Minimi)  31°C   Wrist    2.6 <3.1 10.8 >7.0 100.0 Wrist Abd Dig Minimi 8.0  >50   B Elbow    6.3  10.8  100.0 B Elbow Wrist 22.0 59 >50   A Elbow    8.2  10.1  93.5 A Elbow B Elbow 10.0 53 >50     Comparison Summary Table     Stim Site NR Peak (ms) P-T Amp (µV) Site1 Site2 Dist (cm) Delta-0 (ms)   Left Median/Ulnar Palm Comparison (Wrist)  31.9°C   Median Palm    2.3 31.4 Median Palm Ulnar Palm 8.0 0.7 Ulnar Palm    1.6 15.7       Right Median/Ulnar Palm Comparison (Wrist)  31.9°C   Median Palm    2.4 22.6 Median Palm Ulnar Palm 8.0 0.8   Ulnar Palm    1.8 17.7         F Wave Studies     NR F-Lat (ms) Lat Norm (ms) L-R F-Lat (ms) L-R Lat Norm   Right Tibial (Mrkrs) (Abd Hallucis)  32.8°C      43.75 <56  <5.7   Left Ulnar (Mrkrs) (Abd Dig Min)  31.9°C      27.21 <32 0.00 <2.5   Right Ulnar (Mrkrs) (Abd Dig Min)  31°C      27.21 <32 0.00 <2.5     H Reflex Studies     NR H-Lat (ms) L-R H-Lat (ms) L-R Lat Norm   Right Tibial (Gastroc)  32.8°C      33.03  <2.0     EMG     Side Muscle Nerve Root Ins Act Fibs Psw Recrt Duration Amp Poly Comment   Left 1stDorInt Ulnar C8-T1 Nml Nml Nml Nml Nml Nml Nml    Left ExtIndicis Radial (Post Int) C7-8 Nml Nml Nml Nml Nml Nml Nml    Left Abd Poll Brev Median C8-T1 Nml Nml Nml Nml Nml Nml Nml    Left Biceps Musculocut C5-6 Nml Nml Nml Nml Nml Nml Nml    Left Triceps Radial C6-7-8 Nml Nml Nml Nml Nml Nml Nml    Left Deltoid Axillary C5-6 Nml Nml Nml Nml Nml Nml Nml    Right Ext Dig Brev Dp Br Peron L5, S1 Nml Nml Nml Nml Nml Nml Nml    Right AbdHallucis MedPlantar S1-2 Nml Nml Nml Nml Nml Nml Nml    Right 1stDorInt Ulnar C8-T1 Nml Nml Nml Nml Nml Nml Nml    Right AntTibialis Dp Br Peron L4-5 Nml Nml Nml Nml Nml Nml Nml    Right MedGastroc Tibial S1-2 Nml Nml Nml Nml Nml Nml Nml    Right VastusLat Femoral L2-4 Nml Nml Nml Nml Nml Nml Nml    Right GluteusMed SupGluteal L4-S1 Nml Nml Nml Nml Nml Nml Nml    Right Lower Lumb Parasp Rami L5,S1 Nml Nml Nml Nml Nml Nml Nml    Right Lower Cerv Parasp Rami C7,T1 Nml Nml Nml Nml Nml Nml Nml      Right ExtIndicis Radial (Post Int) C7-8 Nml Nml Nml Nml Nml Nml Nml    Right Abd Poll Brev Median C8-T1 Nml Nml Nml Nml Nml Nml Nml    Right Biceps Musculocut C5-6 Nml Nml Nml Nml Nml Nml Nml    RIght Triceps Radial C6-7-8 Nml Nml Nml Nml Nml Nml Nml    Right Deltoid Axillary C5-6 Nml Nml Nml Nml Nml Nml Nml                Nerve Conduction Studies  Anti Sensory Left/Right Comparison     Stim Site L Lat (ms) R Lat (ms) L-R Lat (ms) L Amp (µV) R Amp (µV) L-R Amp (%) Site1 Site2 L Marky (m/s) R Marky (m/s) L-R Marky (m/s)   Median Anti Sensory (2nd Digit)  29.9°C   Wrist 2.7 3.0 0.3 45.5 17.5 61.5 Wrist 2nd Digit 52 47 5   Radial Anti Sensory (Base 1st Digit)  31.4°C   Wrist 1.5 1.2 0.3 68.4 84.5 19.1 Wrist Base 1st Digit 67 83 16   Sup Fibular Anti Sensory (Lat ankle)  32.5°C   Lower leg  1.9   16.5  Lower leg Lat ankle  53    Sural Anti Sensory (Lat Mall)  31.3°C   Calf  2.4   17.6  Calf Lat Mall  58    Ulnar Anti Sensory (5th Digit)  30.8°C   Wrist 2.4 2.5 0.1 35.3 28.3 19.8 Wrist 5th Digit 58 56 2     Motor Left/Right Comparison     Stim Site L Lat (ms) R Lat (ms) L-R Lat (ms) L Amp (mV) R Amp (mV) L-R Amp (%) Site1 Site2 L Marky (m/s) R Marky (m/s) L-R Marky (m/s)   Fibular Motor (Ext Dig Brev)  23.1°C   Ankle  3.5   7.3  Ankle Ext Dig Brev      B Fib  9.9   7.2  B Fib Ankle  47    Poplt  12.1   6.7  Poplt B Fib  45    Median Motor (Abd Poll Brev)  31.8°C   Wrist 3.8 4.4 0.6 5.7 4.9 14.0 Wrist Abd Poll Brev      Elbow 7.7 8.4 0.7 4.9 3.5 28.6 Elbow Wrist 51 53 2   Tibial Motor (Abd Prince Brev)  32.8°C   Ankle  3.8   7.6  Ankle Abd Prince Brev      Knee  11.3   7.3  Knee Ankle  49    Ulnar Motor (Abd Dig Minimi)  32.1°C   Wrist 2.5 2.6 0.1 11.1 10.8 2.7 Wrist Abd Dig Minimi      B Elbow 6.6 6.3 0.3 9.7 10.8 10.2 B Elbow Wrist 56 59 3   A Elbow 8.1 8.2 0.1 9.4 10.1 6.9 A Elbow B Elbow 67 53 14     Comparison Left/Right Comparison     Stim Site L Lat (ms) R Lat (ms) L-R Lat (ms) L Amp (µV) R Amp (µV) L-R Amp (%)   Median/Ulnar Palm Comparison (Wrist)  31.9°C   Median Palm 1.7 2.0 0.3 23.7 21.2 10.5   Ulnar Palm 1.0 1.2 0.2 16.5 9.4 43.0         Waveforms:

## 2019-11-25 ENCOUNTER — TELEPHONE (OUTPATIENT)
Dept: NEUROLOGY | Age: 39
End: 2019-11-25

## 2019-11-25 ENCOUNTER — OFFICE VISIT (OUTPATIENT)
Dept: NEUROLOGY | Age: 39
End: 2019-11-25

## 2019-11-25 VITALS
SYSTOLIC BLOOD PRESSURE: 128 MMHG | OXYGEN SATURATION: 97 % | HEIGHT: 65 IN | HEART RATE: 107 BPM | BODY MASS INDEX: 38.82 KG/M2 | WEIGHT: 233 LBS | DIASTOLIC BLOOD PRESSURE: 90 MMHG

## 2019-11-25 DIAGNOSIS — R29.898 WEAKNESS OF BOTH ARMS: Primary | ICD-10-CM

## 2019-11-25 DIAGNOSIS — M54.2 NECK PAIN: ICD-10-CM

## 2019-11-25 RX ORDER — LISINOPRIL AND HYDROCHLOROTHIAZIDE 10; 12.5 MG/1; MG/1
1 TABLET ORAL DAILY
Qty: 30 TAB | Refills: 5 | Status: SHIPPED | OUTPATIENT
Start: 2019-11-25 | End: 2020-06-15

## 2019-11-25 NOTE — PROGRESS NOTES
Pressure in head. Back pain. EMG results. Was told by dr. Tona Schafer to try the aimovig without botox. She wants to continue with botox due to her neck strain.

## 2019-11-25 NOTE — TELEPHONE ENCOUNTER
Called [employer] to check status. Patient has a balance on the account and does have to give permission to ship before botox can be delivered. Patient is aware that she needs to give permission to ship.

## 2019-11-26 NOTE — PROGRESS NOTES
Priti Evans is a 37 y.o. female who presents with the following  Chief Complaint   Patient presents with    Results     EMG       HPI     Patient comes in for a follow up for EMG results. Still having significant neck pain, tightness, and bilateral arm symptoms. She has noticed she is a unable to turn her neck certain ways due to it getting caught and the pain radiating into the right shoulder and arm worse. She has weakness in both arms and hands. Has started to drop things more frequently. Does want to be seen by rheumatology to be evaluated for AS. She has done a lot of research and mimics a lot of these symptoms. Patient comes in for a follow up for migraines, low back pain, fibromyalgia. Things are good and bad at times. Today she is feeling a migraine in office and is lying on the tablet with her sunglasses on. She has noticed they come and go. The Botox and Aimovig seem to be doing well together. but we did stop botox to see how things went but she wants to start this back up. She no longer has the daily, chronic pressure behind the eyes. She does still get light sensitivity. She has nausea, dizziness and memory fog still. She has to write down more things then normal. She will still sometimes get cluster migraines. Getting PT at [facility] and this is not really helping. Also has been getting dry needling a lot with no results. Also doing daily stretching.      Her neck is painful. Especially on the right side and into the right shoulder and arm.    pain with flexion, extension and side to side. She has been using Robaxin TID for spasms, pain and this is helping. She has noticed this can help decrease day to day symptoms.                Allergies   Allergen Reactions    Corticosteroids (Glucocorticoids) Anxiety    Morphine Itching and Nausea and Vomiting       Current Outpatient Medications   Medication Sig    lisinopril-hydroCHLOROthiazide (PRINZIDE, ZESTORETIC) 10-12.5 mg per tablet Take 1 Tab by mouth daily.  pregabalin (LYRICA) 75 mg capsule Take 1 Cap by mouth two (2) times a day. Max Daily Amount: 150 mg.    erenumab-aooe (AIMOVIG AUTOINJECTOR) 140 mg/mL injection 1 mL by SubCUTAneous route every thirty (30) days.  ketorolac (TORADOL) 10 mg tablet Take 1 Tab by mouth every six (6) hours as needed for Pain.  dihydroergotamine (MIGRANAL) 0.5 mg/pump act. (4 mg/mL) nasal spray 1 spray in both nostrils at HA onset. May repeat again in 15 minutes. May repeat X 1 dose. Max 2 doses in 24 hours.  meclizine (ANTIVERT) 25 mg tablet Take 1 Tab by mouth three (3) times daily as needed for Dizziness.  onabotulinumtoxinA (BOTOX) 200 unit injection INJECT 155 UNITS IN NECK AND FACE INTRAMUSCULARLY AT 31 FDA APPROVED SITES EVERY 12 WEEKS FOR CHRONIC MIGRAINE.  diazePAM (VALIUM) 5 mg tablet Take 1 Tab by mouth every eight (8) hours as needed (spasm). Max Daily Amount: 15 mg.    lidocaine (LIDODERM) 5 % Apply patch to the affected area for 12 hours a day and remove for 12 hours a day.  DULoxetine (CYMBALTA) 60 mg capsule Take 60 mg by mouth daily.  HYDROcodone-acetaminophen (NORCO) 5-325 mg per tablet Take 1 tablet by mouth BID PRN    methocarbamol (ROBAXIN) 500 mg tablet Take 1 Tab by mouth three (3) times daily as needed.  topiramate (TOPAMAX) 100 mg tablet Take 1 tablet by mouth nightly. No current facility-administered medications for this visit.         Social History     Tobacco Use   Smoking Status Never Smoker   Smokeless Tobacco Never Used       Past Medical History:   Diagnosis Date    Anxiety     Essential hypertension     chronic pt takes labetolol    Headache     Musculoskeletal disorder     Scoliosis     Snoring        Past Surgical History:   Procedure Laterality Date     DELIVERY ONLY      HX CARPAL TUNNEL RELEASE      HX GYN      HX ORTHOPAEDIC      HX OTHER SURGICAL      left knee surgery     NEUROLOGICAL PROCEDURE UNLISTED      TX CHEMODERVATE FACIAL/TRIGEM/CERV MUSC MIGRAINE  10/24/2018       Family History   Problem Relation Age of Onset    Diabetes Mother     Hypertension Mother     Cancer Mother     Neuropathy Mother     Hypertension Father     Cancer Father     Cancer Maternal Grandfather     Stroke Maternal Grandfather     Diabetes Paternal Grandmother     Stroke Paternal Grandmother     Heart Disease Paternal Grandmother     Heart Disease Paternal Uncle        Social History     Socioeconomic History    Marital status:      Spouse name: Not on file    Number of children: Not on file    Years of education: Not on file    Highest education level: Not on file   Tobacco Use    Smoking status: Never Smoker    Smokeless tobacco: Never Used   Substance and Sexual Activity    Alcohol use: Yes     Comment: wine    Drug use: No    Sexual activity: Yes     Partners: Male       Review of Systems   Eyes: Positive for blurred vision, double vision and photophobia. Respiratory: Negative for shortness of breath and wheezing. Gastrointestinal: Positive for nausea. Negative for vomiting. Musculoskeletal: Positive for back pain, joint pain, myalgias and neck pain. Neurological: Positive for dizziness, tingling, sensory change and headaches. Negative for tremors. Psychiatric/Behavioral: Positive for memory loss. Remainder of comprehensive review is negative. Physical Exam :    Visit Vitals  /90   Pulse (!) 107   Ht 5' 5\" (1.651 m)   Wt 105.7 kg (233 lb)   SpO2 97%   BMI 38.77 kg/m²           Results for orders placed or performed during the hospital encounter of 09/14/19   SAMPLES BEING HELD   Result Value Ref Range    SAMPLES BEING HELD 1BLUE 1LAV 1RED     COMMENT        Add-on orders for these samples will be processed based on acceptable specimen integrity and analyte stability, which may vary by analyte.    TROPONIN I   Result Value Ref Range    Troponin-I, Qt. <0.05 <0.05 ng/mL   HCG QL SERUM   Result Value Ref Range    HCG, Ql. NEGATIVE  NEG     POC CHEM8   Result Value Ref Range    Calcium, ionized (POC) 1.24 1.12 - 1.32 mmol/L    Sodium (POC) 138 136 - 145 mmol/L    Potassium (POC) 3.9 3.5 - 5.1 mmol/L    Chloride (POC) 100 98 - 107 mmol/L    CO2 (POC) 27 21 - 32 mmol/L    Anion gap (POC) 17 10 - 20 mmol/L    Glucose (POC) 79 65 - 100 mg/dL    BUN (POC) 7 (L) 9 - 20 mg/dL    Creatinine (POC) 0.7 0.6 - 1.3 mg/dL    GFRAA, POC >60 >60 ml/min/1.73m2    GFRNA, POC >60 >60 ml/min/1.73m2    Hematocrit (POC) 42 35.0 - 47.0 %    Comment Comment Not Indicated. EKG, 12 LEAD, INITIAL   Result Value Ref Range    Ventricular Rate 91 BPM    Atrial Rate 91 BPM    P-R Interval 150 ms    QRS Duration 78 ms    Q-T Interval 360 ms    QTC Calculation (Bezet) 442 ms    Calculated P Axis 65 degrees    Calculated R Axis 46 degrees    Calculated T Axis 18 degrees    Diagnosis       Normal sinus rhythm with sinus arrhythmia  When compared with ECG of 27-MAY-2019 23:30,  No significant change was found  Confirmed by Guerrero Creda ([id number]) on 9/15/2019 4:17:17 PM         Orders Placed This Encounter    MRI CERV SPINE W WO CONT     Standing Status:   Future     Standing Expiration Date:   12/25/2020     Order Specific Question:   Is Patient Allergic to Contrast Dye? Answer:   No     Order Specific Question:   Is Patient Pregnant? Answer:   No     Order Specific Question:   STAT Creatinine as indicated     Answer: Yes    lisinopril-hydroCHLOROthiazide (PRINZIDE, ZESTORETIC) 10-12.5 mg per tablet     Sig: Take 1 Tab by mouth daily. Dispense:  30 Tab     Refill:  5       1. Weakness of both arms    2. Neck pain      Discussed EMG and CTS in full. Continue to keep track of symptoms. We will get an MRI cervical spine as looking for herniation, degeneration, stenosis as causes of her symptoms.    Keep Koreen Bhavik but also re-initiate Botox to help with migraine prevention as this has really helped her shoulders and the neck pain aspect. She will continue to use her current PRN rescues. Continue to stay active. Keep with PT exercises. She will get in with rheumatology.                This note will not be viewable in [employer]

## 2019-12-03 NOTE — TELEPHONE ENCOUNTER
Called and spoke to pharmacy and now they need a new script stating it has been over a year   Transferred to pharmacy   Gave verbal for botox

## 2019-12-25 ENCOUNTER — APPOINTMENT (OUTPATIENT)
Dept: GENERAL RADIOLOGY | Age: 39
End: 2019-12-25
Attending: STUDENT IN AN ORGANIZED HEALTH CARE EDUCATION/TRAINING PROGRAM
Payer: COMMERCIAL

## 2019-12-25 ENCOUNTER — HOSPITAL ENCOUNTER (EMERGENCY)
Age: 39
Discharge: HOME | End: 2019-12-25
Attending: STUDENT IN AN ORGANIZED HEALTH CARE EDUCATION/TRAINING PROGRAM | Admitting: STUDENT IN AN ORGANIZED HEALTH CARE EDUCATION/TRAINING PROGRAM
Payer: COMMERCIAL

## 2019-12-25 VITALS
HEART RATE: 83 BPM | BODY MASS INDEX: 36.65 KG/M2 | RESPIRATION RATE: 13 BRPM | OXYGEN SATURATION: 97 % | WEIGHT: 220 LBS | SYSTOLIC BLOOD PRESSURE: 139 MMHG | DIASTOLIC BLOOD PRESSURE: 87 MMHG | HEIGHT: 65 IN | TEMPERATURE: 98.1 F

## 2019-12-25 DIAGNOSIS — R07.9 CHEST PAIN, UNSPECIFIED TYPE: Primary | ICD-10-CM

## 2019-12-25 LAB
ALBUMIN SERPL-MCNC: 3.8 G/DL (ref 3.5–5)
ALBUMIN/GLOB SERPL: 1 {RATIO} (ref 1.1–2.2)
ALP SERPL-CCNC: 74 U/L (ref 45–117)
ALT SERPL-CCNC: 71 U/L (ref 12–78)
ANION GAP SERPL CALC-SCNC: 7 MMOL/L (ref 5–15)
AST SERPL-CCNC: 25 U/L (ref 15–37)
BASOPHILS # BLD: 0 K/UL (ref 0–0.1)
BASOPHILS NFR BLD: 0 % (ref 0–1)
BILIRUB SERPL-MCNC: 0.2 MG/DL (ref 0.2–1)
BUN SERPL-MCNC: 12 MG/DL (ref 6–20)
BUN/CREAT SERPL: 16 (ref 12–20)
CALCIUM SERPL-MCNC: 9 MG/DL (ref 8.5–10.1)
CHLORIDE SERPL-SCNC: 105 MMOL/L (ref 97–108)
CO2 SERPL-SCNC: 27 MMOL/L (ref 21–32)
COMMENT, HOLDF: NORMAL
CREAT SERPL-MCNC: 0.76 MG/DL (ref 0.55–1.02)
D DIMER PPP FEU-MCNC: 0.44 MG/L FEU (ref 0–0.65)
DIFFERENTIAL METHOD BLD: ABNORMAL
EOSINOPHIL # BLD: 0.1 K/UL (ref 0–0.4)
EOSINOPHIL NFR BLD: 1 % (ref 0–7)
ERYTHROCYTE [DISTWIDTH] IN BLOOD BY AUTOMATED COUNT: 13.6 % (ref 11.5–14.5)
GLOBULIN SER CALC-MCNC: 3.9 G/DL (ref 2–4)
GLUCOSE SERPL-MCNC: 104 MG/DL (ref 65–100)
HCT VFR BLD AUTO: 39.2 % (ref 35–47)
HGB BLD-MCNC: 12.7 G/DL (ref 11.5–16)
IMM GRANULOCYTES # BLD AUTO: 0 K/UL (ref 0–0.04)
IMM GRANULOCYTES NFR BLD AUTO: 0 % (ref 0–0.5)
LYMPHOCYTES # BLD: 4.1 K/UL (ref 0.8–3.5)
LYMPHOCYTES NFR BLD: 48 % (ref 12–49)
MCH RBC QN AUTO: 31.1 PG (ref 26–34)
MCHC RBC AUTO-ENTMCNC: 32.4 G/DL (ref 30–36.5)
MCV RBC AUTO: 96.1 FL (ref 80–99)
MONOCYTES # BLD: 0.6 K/UL (ref 0–1)
MONOCYTES NFR BLD: 7 % (ref 5–13)
NEUTS SEG # BLD: 3.9 K/UL (ref 1.8–8)
NEUTS SEG NFR BLD: 44 % (ref 32–75)
NRBC # BLD: 0 K/UL (ref 0–0.01)
NRBC BLD-RTO: 0 PER 100 WBC
PLATELET # BLD AUTO: 264 K/UL (ref 150–400)
PMV BLD AUTO: 10.2 FL (ref 8.9–12.9)
POTASSIUM SERPL-SCNC: 3.6 MMOL/L (ref 3.5–5.1)
PROT SERPL-MCNC: 7.7 G/DL (ref 6.4–8.2)
RBC # BLD AUTO: 4.08 M/UL (ref 3.8–5.2)
SAMPLES BEING HELD,HOLD: NORMAL
SODIUM SERPL-SCNC: 139 MMOL/L (ref 136–145)
TROPONIN I SERPL-MCNC: <0.05 NG/ML
WBC # BLD AUTO: 8.8 K/UL (ref 3.6–11)

## 2019-12-25 PROCEDURE — 85379 FIBRIN DEGRADATION QUANT: CPT

## 2019-12-25 PROCEDURE — 36415 COLL VENOUS BLD VENIPUNCTURE: CPT

## 2019-12-25 PROCEDURE — 74011250636 HC RX REV CODE- 250/636: Performed by: STUDENT IN AN ORGANIZED HEALTH CARE EDUCATION/TRAINING PROGRAM

## 2019-12-25 PROCEDURE — 84484 ASSAY OF TROPONIN QUANT: CPT

## 2019-12-25 PROCEDURE — 93005 ELECTROCARDIOGRAM TRACING: CPT

## 2019-12-25 PROCEDURE — 71046 X-RAY EXAM CHEST 2 VIEWS: CPT

## 2019-12-25 PROCEDURE — 85025 COMPLETE CBC W/AUTO DIFF WBC: CPT

## 2019-12-25 PROCEDURE — 99283 EMERGENCY DEPT VISIT LOW MDM: CPT

## 2019-12-25 PROCEDURE — 80053 COMPREHEN METABOLIC PANEL: CPT

## 2019-12-25 PROCEDURE — 96374 THER/PROPH/DIAG INJ IV PUSH: CPT

## 2019-12-25 RX ORDER — KETOROLAC TROMETHAMINE 30 MG/ML
30 INJECTION, SOLUTION INTRAMUSCULAR; INTRAVENOUS
Status: COMPLETED | OUTPATIENT
Start: 2019-12-25 | End: 2019-12-25

## 2019-12-25 RX ADMIN — KETOROLAC TROMETHAMINE 30 MG: 30 INJECTION, SOLUTION INTRAMUSCULAR at 02:29

## 2019-12-25 NOTE — ED PROVIDER NOTES
Patient is a 77-year-old female history of chronic pain here today secondary to chest pain. She states around 11 PM tonight she had sudden onset of pain in the center of her chest described as a squeezing sensation that went to her left jaw. It lasted for a few seconds and then went away. States that about an hour later she had a repeat episode which is been persistent since. Still describes a squeezing/gripping pain in the middle of her chest.  No shortness of breath but states that she has trouble filling her chest with the air. She has no known cardiac history or pulmonary history. She denies tobacco use. She has no family history of cardiac disease at a young age. She denies leg pain or leg swelling. No history of DVT or PE. She is on birth control and reports recent travel to [place] this week. Denies cough, fever, back pain, abdominal pain or any other acute concerns at this time.            Past Medical History:   Diagnosis Date    Anxiety     Essential hypertension     chronic pt takes labetolol    Headache     Musculoskeletal disorder     Scoliosis     Snoring        Past Surgical History:   Procedure Laterality Date     DELIVERY ONLY      HX CARPAL TUNNEL RELEASE      HX GYN      HX ORTHOPAEDIC      HX OTHER SURGICAL      left knee surgery 2007    NEUROLOGICAL PROCEDURE UNLISTED      AL CHEMODERVATE FACIAL/TRIGEM/CERV MUSC MIGRAINE  10/24/2018         Family History:   Problem Relation Age of Onset    Diabetes Mother     Hypertension Mother     Cancer Mother     Neuropathy Mother     Hypertension Father     Cancer Father     Cancer Maternal Grandfather     Stroke Maternal Grandfather     Diabetes Paternal Grandmother     Stroke Paternal Grandmother     Heart Disease Paternal Grandmother     Heart Disease Paternal Uncle        Social History     Socioeconomic History    Marital status:      Spouse name: Not on file    Number of children: Not on file    Years of education: Not on file    Highest education level: Not on file   Occupational History    Not on file   Social Needs    Financial resource strain: Not on file    Food insecurity:     Worry: Not on file     Inability: Not on file    Transportation needs:     Medical: Not on file     Non-medical: Not on file   Tobacco Use    Smoking status: Never Smoker    Smokeless tobacco: Never Used   Substance and Sexual Activity    Alcohol use: Yes     Comment: wine    Drug use: No    Sexual activity: Yes     Partners: Male   Lifestyle    Physical activity:     Days per week: Not on file     Minutes per session: Not on file    Stress: Not on file   Relationships    Social connections:     Talks on phone: Not on file     Gets together: Not on file     Attends Nondenominational service: Not on file     Active member of club or organization: Not on file     Attends meetings of clubs or organizations: Not on file     Relationship status: Not on file    Intimate partner violence:     Fear of current or ex partner: Not on file     Emotionally abused: Not on file     Physically abused: Not on file     Forced sexual activity: Not on file   Other Topics Concern    Not on file   Social History Narrative    Not on file         ALLERGIES: Corticosteroids (glucocorticoids) and Morphine    Review of Systems   Constitutional: Negative for chills and fever. HENT: Negative for congestion and rhinorrhea. Eyes: Negative for redness and visual disturbance. Respiratory: Negative for cough. Cardiovascular: Positive for chest pain. Negative for leg swelling. Gastrointestinal: Negative for abdominal pain, diarrhea, nausea and vomiting. Genitourinary: Negative for dysuria, flank pain, frequency, hematuria and urgency. Musculoskeletal: Positive for arthralgias. Negative for back pain, myalgias and neck pain. Skin: Negative for rash and wound. Allergic/Immunologic: Negative for immunocompromised state.    Neurological: Negative for dizziness and headaches. Vitals:    12/25/19 0156 12/25/19 0158   BP:  139/87   Pulse: 84    Resp: 17    Temp: 98.1 °F (36.7 °C)    SpO2: 98%    Weight: 99.8 kg (220 lb)    Height: 5' 5\" (1.651 m)             Physical Exam  Vitals signs and nursing note reviewed. Constitutional:       General: She is not in acute distress. Appearance: She is well-developed. She is not diaphoretic. HENT:      Head: Normocephalic. Mouth/Throat:      Pharynx: No oropharyngeal exudate. Eyes:      General:         Right eye: No discharge. Left eye: No discharge. Pupils: Pupils are equal, round, and reactive to light. Neck:      Musculoskeletal: Normal range of motion and neck supple. Cardiovascular:      Rate and Rhythm: Normal rate and regular rhythm. Heart sounds: Normal heart sounds. No murmur. No friction rub. No gallop. Pulmonary:      Effort: Pulmonary effort is normal. No respiratory distress. Breath sounds: Normal breath sounds. No stridor. No wheezing or rales. Abdominal:      General: Bowel sounds are normal. There is no distension. Palpations: Abdomen is soft. Tenderness: There is no tenderness. There is no guarding or rebound. Musculoskeletal: Normal range of motion. General: No deformity. Skin:     General: Skin is warm and dry. Capillary Refill: Capillary refill takes less than 2 seconds. Findings: No rash. Neurological:      Mental Status: She is alert and oriented to person, place, and time.    Psychiatric:         Behavior: Behavior normal.        EKG Interpretation:   ED Physician interpretation  NSr rate of 77, normal axis, normal intervals, no ST elevation or depression    Labs Reviewed:   CBC: no significant leukocytosis or anemia   CMP: normal renal function, no significant electrolyte abnormality, glucose normal, LFT within normal limits  Trop: normal  D dimer negative        Imaging Reviewed:   CXR neg for acute process      Course: Toradol 15mg IV given with complete resolution of pain        MDM:39 y.o. female presenting today with chest pain that started this evening. No significant cardiac risk factors. Non-smoker. Normal Trop and EKG with minimal risk factors makes ACS unlikely. No PNA or pneumothorax on cxr. No pulmonary edema. Is on OCP and traveled so I did check d-dimer which was neg making PE unlikely. Suspect msk vs gi etiology. Pt feeling much better at time of discharge. Clinical Impression:     ICD-10-CM ICD-9-CM    1.  Chest pain, unspecified type R07.9 786.50            Disposition: AJ Cosme DO

## 2019-12-25 NOTE — DISCHARGE INSTRUCTIONS
PLEASE RETURN IF WORSENING PAIN OR IF YOU NOTICE IT WHEN EXERTING YOURSELF, TROUBLE BREATHING, SWEATINESS, OR DIZZINESS. FOLLOW UP WITH YOUR DOCTOR IN THE NEXT 2-3 DAYS.

## 2019-12-25 NOTE — ED TRIAGE NOTES
Chief Complaint   Patient presents with    Chest Pain     since earlier last night and the pain has gotten worse in the last 45 min. States it is radiating to right side jaw.  Denies cardiac hx, n/v, and SOB

## 2019-12-26 LAB
ATRIAL RATE: 77 BPM
CALCULATED P AXIS, ECG09: 66 DEGREES
CALCULATED R AXIS, ECG10: 44 DEGREES
CALCULATED T AXIS, ECG11: 8 DEGREES
DIAGNOSIS, 93000: NORMAL
P-R INTERVAL, ECG05: 162 MS
Q-T INTERVAL, ECG07: 382 MS
QRS DURATION, ECG06: 78 MS
QTC CALCULATION (BEZET), ECG08: 432 MS
VENTRICULAR RATE, ECG03: 77 BPM

## 2020-01-02 ENCOUNTER — TELEPHONE (OUTPATIENT)
Dept: NEUROLOGY | Age: 40
End: 2020-01-02

## 2020-01-02 NOTE — TELEPHONE ENCOUNTER
Pt states she needs her Botox ASAP pt states she had to cancel her last botox injection in December due to not feeling well and being in and out of the ER. States she cannot wait until March.

## 2020-01-14 ENCOUNTER — TELEPHONE (OUTPATIENT)
Dept: NEUROLOGY | Age: 40
End: 2020-01-14

## 2020-01-14 NOTE — TELEPHONE ENCOUNTER
Received patient's Botox today from [facility]. [phone]  RX # [id number]  With 5 refills remaining.

## 2020-01-21 ENCOUNTER — OFFICE VISIT (OUTPATIENT)
Dept: NEUROLOGY | Age: 40
End: 2020-01-21

## 2020-01-21 VITALS
HEART RATE: 9 BPM | HEIGHT: 65 IN | SYSTOLIC BLOOD PRESSURE: 122 MMHG | DIASTOLIC BLOOD PRESSURE: 90 MMHG | OXYGEN SATURATION: 98 % | BODY MASS INDEX: 36.61 KG/M2

## 2020-01-21 RX ORDER — MECLIZINE HYDROCHLORIDE 25 MG/1
25 TABLET ORAL
Qty: 90 TAB | Refills: 5 | Status: SHIPPED | OUTPATIENT
Start: 2020-01-21

## 2020-01-21 NOTE — PROGRESS NOTES
[facility]  OFFICE PROCEDURE PROGRESS NOTE        Chart reviewed for the following:   Judit FINLEY NP, have reviewed the History, Physical and updated the Allergic reactions for [place] performed immediately prior to start of procedure:   Judit FNILEY NP, have performed the following reviews on Jose Pratt prior to the start of the procedure:            * Patient was identified by name and date of birth   * Agreement on procedure being performed was verified  * Risks and Benefits explained to the patient  * Procedure site verified and marked as necessary  * Patient was positioned for comfort  * Consent was signed and verified     Time: 1000      Date of procedure: 1/21/2020    Procedure performed by:  Konstantin Finch NP    Provider assisted by: None    Patient assisted by: None    How tolerated by patient: tolerated the procedure well with no complications    Post Procedural Pain Scale: 2 - Hurts Little Bit    Comments: None      Botox Injection Note       Indication: patient has chronic recurrent migraine, has 7-10 less migraine days per month with botox injections    Procedure:   Botox concentration: 200 units in 4 ml of preservative-free normal saline. 31 sites injections, distribution as follow      Units/site  Sites Sides Subtotal    Procerus 5 1 1 5    5 1 2 10   Frontalis 5 2 2 20   Temporalis 5 4 2 40   Occipitalis 5 3 2 30   Upper cervical paraspinalis 5 2 2 20   Trapezius 5 3 2 30         200 units Botox were reconstituted, 155 units injected as above and the remainder was unavoidably wasted.      Patient tolerated procedure well.     _____________________________   Ronald Bence NP

## 2020-03-16 RX ORDER — METHOCARBAMOL 500 MG/1
TABLET, FILM COATED ORAL
Qty: 90 TAB | Refills: 5 | Status: SHIPPED | OUTPATIENT
Start: 2020-03-16

## 2020-03-26 ENCOUNTER — TELEPHONE (OUTPATIENT)
Dept: NEUROLOGY | Age: 40
End: 2020-03-26

## 2020-03-26 NOTE — TELEPHONE ENCOUNTER
Received patient's Botox today from [facility].   [phone]  RX # [id number]  With 4 refills remaining

## 2020-04-27 ENCOUNTER — TELEPHONE (OUTPATIENT)
Dept: RHEUMATOLOGY | Age: 40
End: 2020-04-27

## 2020-04-27 NOTE — TELEPHONE ENCOUNTER
I called and confirmed with the patient on 4/27/2020 for their next day 4/28/2020 appointment to arrive 30 minutes before their appointment to fill out office paperwork patient was also told to please bring records. SDH.

## 2020-04-28 ENCOUNTER — OFFICE VISIT (OUTPATIENT)
Dept: RHEUMATOLOGY | Age: 40
End: 2020-04-28

## 2020-04-28 VITALS
TEMPERATURE: 98.2 F | HEIGHT: 65 IN | BODY MASS INDEX: 38.65 KG/M2 | RESPIRATION RATE: 18 BRPM | HEART RATE: 102 BPM | WEIGHT: 232 LBS | SYSTOLIC BLOOD PRESSURE: 128 MMHG | DIASTOLIC BLOOD PRESSURE: 94 MMHG

## 2020-04-28 DIAGNOSIS — M53.3 COCCYDYNIA: ICD-10-CM

## 2020-04-28 DIAGNOSIS — M54.50 CHRONIC BILATERAL LOW BACK PAIN WITHOUT SCIATICA: Primary | ICD-10-CM

## 2020-04-28 DIAGNOSIS — G56.03 BILATERAL CARPAL TUNNEL SYNDROME: ICD-10-CM

## 2020-04-28 DIAGNOSIS — G89.29 CHRONIC BILATERAL LOW BACK PAIN WITHOUT SCIATICA: Primary | ICD-10-CM

## 2020-04-28 DIAGNOSIS — M53.3 SACROILIAC JOINT DYSFUNCTION OF BOTH SIDES: ICD-10-CM

## 2020-04-28 RX ORDER — BISMUTH SUBSALICYLATE 262 MG
1 TABLET,CHEWABLE ORAL DAILY
COMMUNITY

## 2020-04-28 NOTE — PATIENT INSTRUCTIONS
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

## 2020-04-28 NOTE — PROGRESS NOTES
REASON FOR VISIT    This is the initial evaluation for Ms. Yvonne Contreras a 37 y.o.  female for question of a seronegative spondyloarthritis. The patient is self-referred to the General acute hospital. HISTORY OF PRESENT ILLNESS     I have reviewed and summarized old records from [address], [address] [facility],[address] Floor, [facility]), Dr. Breana Vaughn, rheumatology office note, [facility] and [facility]. She has a history of levoscoliosis with chronic back pain, migraine, and fibromyalgia. In 2011, MRI Left Knee without contrast showed here is low signal adjacent to the superior trochlea in the patellofemoral articulation possibly representing a small plica. There is a cartilage defect within the inferior medial patellar facet with fissuring and formation of a small flap. There is mild edema in Hoffa's fat pad adjacent to the inferior pole patella. There is mild bone edema within the patella inferiorly. There is relative asymmetric truncation of the free edge of the body of the medial meniscus. This may represent prior meniscal repair. The medial and lateral menisci are otherwise preserved. The anterior cruciate and the posterior cruciate ligaments are preserved. The medial collateral ligament is preserved. The lateral collateral ligament complex, including the tendon of the long head of the biceps femoris, the fibular collateral ligament, and the popliteus tendon is preserved. The extensor mechanism, including the quadriceps and patellar tendons is intact. The medial and lateral patellar retinacula are intact. The cartilage within the medial and lateral compartments is preserved. There is a small osteophyte within the trochlea in the medial compartment    In , after her delivery of her daughter (), she developed twinge pain in her low back that was initial subtle that progressed into severe pain where she could not walk.  She also developed chronic fatigue, body aches, trigger point pain, low back and tail bone pain, leg weakness, and migraines. 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 No definite needle denervation. However, the sensory amplitude suggests the possibility of some axonal loss. There is evidence of moderate left sensorimotor median neuropathy at the wrist without associated denervation. There is no evidence of an ulnar neuropathy at the elbow bilaterally electrodiagnostically. There is no evidence of a generalized peripheral polyneuropathy in the portion of the test performed. In 12/13/2016, MRI Lumbar Spine without contrast showed Overall vertebral body heights are well-maintained. Minor levoscoliosis persists similar to the prior CT appearance. No developing new alignment abnormality is seen. There is mild disc space height loss and disc desiccation at the lumbar sacral junction. The other levels are better preserved. Conus medullaris ends at a normal level and is grossly normal in morphology. Limited evaluation of the surrounding soft tissue structures shows no acute appearing process. The included T12-L1 level is unremarkable. The L1-L2 level demonstrates no significant disc abnormality or stenosis. The L2-L3 level demonstrates very minor bilateral facet disease without significant disc abnormality or impingement on the neural elements. The L3-L4 level demonstrates very slight facet arthropathy with without significant neuroforaminal narrowing. No large disc bulge or protrusion is seen. The L4-L5 level demonstrates moderate bilateral facet disease again without significant narrowing of the foramen at the level the exiting nerve root. No large disc abnormality or central canal impingement is seen.  The lumbar sacral junction demonstrates mild bilateral facet arthropathy again without significant neuroforaminal narrowing. There is very minor disc bulging as a diffuse finding with disc material extending slightly more focally off of the disc bulge centrally behind the S1 body over short distance. Neither disc process impinges significantly on the central canal or nerve roots. In 7/31/2017, she was evaluated at [facility], by Dr. Cassidy Phan for coccyx pain. She was diagnosed with coccydynia and lumbar degenerative disc disease at L5-S1 and recommended that she undergo a 2nd sacrococcygeal injection. In 6/30/2018, labs showed negative MIKAYLA IFA, rheumatoid factor, CRP 3.6 mg/L. In 7/11/2018, she saw rheumatology, Dr. Tashia Capone. Labs showed negative HLA-B27. In 5/27/2019, CT Cervical Spine without contrast showed The alignment is within normal limits. There is no fracture or subluxation. The odontoid process is intact. The craniocervical junction is within normal limits. The prevertebral soft tissues are within normal limits. There is no spinal canal or neural foraminal stenosis. The lungs are clear. Surrounding soft tissues appear unremarkable. CT Thoracic Spine without contrast showed The alignment of the thoracic spine is normal with rightward convex thoracic scoliosis. The vertebral body heights and disc spaces are well-preserved. There is no fracture or other acute abnormality. There is no spinal canal stenosis. Summary soft tissues appear unremarkable. Calcified granuloma seen in the left lower lobe posteriorly. CT Lumbar Spine without contrast showed There is intact vertebral body height and alignment. Disc spaces are preserved with minimal degenerative change. No acute fracture or aggressive lesion. Soft tissues appear unremarkable. There is leftward convex scoliosis. The spinal canal and neural foramina are widely patent. The visualized soft tissues are unremarkable. .    In 8/18/2019, CTA Chest with contrast showed LINES/TUBES/MEDICAL SUPPORT DEVICES: None.  LUNG PARENCHYMA: Normal. TRACHEA/BRONCHI: Normal. PULMONARY VESSELS: Normal. No definitive persistent central filling defects identified on multiple contiguous images to diagnose pulmonary embolism. PLEURA: Normal. MEDIASTINUM: Normal. HEART: Normal. AORTA/GREAT VESSELS: No aortic aneurysm or dissection. ESOPHAGUS: Normal. AXILLAE: Normal. BONES/TISSUES: No acute abnormality. There is a scoliotic curvature of the thoracic spine primarily convex to the right. This is a chronic finding and was present in 2015. UPPER ABDOMEN: Negative for acute abnormality. In 11/25/2019, she followed up with neurology for bilateral upper extremity weakness who ordered a MRI Cervical spine and recommended physical therapy. She did not do the neck MRI. In 1/09/2020, she saw rheumatology, Dr. Teresa Vaughn for evaluation for ankylosing spondylitis. Labs showed negative MIKAYLA IFA, ESR 35 mm/hr, CRP 7 mg/L. After testing and further evaluations, she felt that her symptoms were due to mechanical back pain and fibromyalgia and not ankylosing spondylitis (last visit on 4/27/2020)    In 1/15/2020, Cervical Spine radiograph showed there is loss of typical lordotic curvature of the cervical spine. There is no subluxation. There is no fracture. The relationship the occiput with C1 and C2 is normal. The prevertebral soft tissues are normal. The facet joints are appropriately aligned without facet perch or facet lock. There is no foraminal narrowing. Lumbosacral Spine radiograph showed The lumbar spine has 5 nonrib-bearing lumbar vertebral bodies which show scoliotic curvature concave to the right with a Cook angle of 17 degrees. There is no subluxation. There is no compression fracture. There is no spondylolysis. There is no lytic or blastic lesion. The sacroiliac joints show no evidence of ankylosis. There is no lytic or blastic lesion. No obvious focal abnormality of the sacrum is identified. The hips show no obvious focal abnormality.     In 1/21/2020, she followed up with neurology who injected her with Botox. In 2/20/2020, MRI Pelvis without contrast showed Physiologic and symmetric fluid within the bilateral hip joints. Bone marrow signal of the pelvis and bilateral hips is within normal limits without acute fracture, dislocation or AVN. SI joints appear symmetric and within normal limits without erosion, abnormal widening, periarticular edema or intra-articular bright fluid. Smooth bony irregularity of the inferior coccyx without marrow edema is nonacute and could be normal variation or prior trauma. No significant pelvic muscular signal abnormality. Bilateral distal iliopsoas as and proximal hamstring tendons appear intact and symmetric. Near empty bladder. Uterus is grossly within normal limits. Small bright T2 fluid versus cystic lesion in the right adnexa. Disc desiccation with mild height loss and small osteophytes at L5-S1, mild lumbar degenerative disc disease, similar to 2016 lumbar MRI. Today, she wants a second option due to \"extrutiating pain for 7 years\". she complains of pain in her wrist, neck, back, knees, and feet. She has pain in he wrist constant and has difficulty grabbing objects worse in the morning and afternoon. She drops things. She has a sore numbness in her wrists and it feels like she has  around her wrists. She had carpal tunnel syndrome release syndrome several years ago which helped reduce her numbness but this has recurred 2 years ago. It is not worse with use. She has been wearing wrist splints. She has not been doing occupation therapy. Her low back pain is constant sharp pain that is in her butt and radiated to her back and if she walks a short distance, she has worsening pain associated with weakness point to her outer hips and butt area. She does not feel that NSAIDs helped resolve her pain. Hydrocodone helps more that other agents but does not resolve. She feels worse at night.  She has had multiple back injections, dry needling, aqua therapy and physical therapy with pelvic floor activities. She has been evaluated by pain management and Physical Medicine and Rehab at [address] [place] [address]. Physical therapy helped reduce her pain from 10 to 4 to 5 in her low back, SI, groin, and tail bone. She wants to be examined and feels that she may have ankylosing spondylitis. She has left plantar fasciitis and has been following with podiatry. She has numbness or tingling in hands, numbness or tingling in feet, muscle weakness and follows with neurology. EMG/NCS showed bilateral carpal tunnel syndrome previously. Family history is positive for Rheumatoid Arthritis. Therapy History includes:  Current NSAIDs include: Ketorolac  Current DMARD include: none  Prior DMARD therapy includes: none  The following DMARDs have been ineffective: none  The following DMARDs were stopped because of side effects: none    REVIEW OF SYSTEMS    A 15 point review of systems was performed and summarized below. The questionnaire was reviewed with the patient and scanned into the patient's medical record.     General: endorses fatigue, weakness, denies recent weight gain, recent weight loss, fever, drenching night sweats  Musculoskeletal: endorses joint pain, joint swelling, morning stiffness, muscle pain  Ears: denies ringing in ears, hearing loss, deafness  Eyes: endorses migraine-related light sensitive, dryness, denies pain, redness, blindness, double vision, blurred vision, excess tearing, foreign body sensation  Mouth: denies sore tongue, oral ulcers, loss of taste, dryness, increased dental caries  Nose: denies nosebleeds, nasal ulcers  Throat: endorses food stuck when swallowing, pain in jaw while chewing, denies difficulty with swallowing, hoarseness  Neck: denies swollen glands, tender glands  Cardiopulmonary: endorses pain in chest when lying down,denies pain in chest with deep breaths,  murmurs, sudden changes in heart beat, wheezing, dry cough, productive cough, shortness of breath at rest, shortness of breath on exertion, coughing of blood  Gastrointestinal: denies nausea, heartburn, stomach pain relieved by food, chronic constipation, chronic diarrhea, blood in stools, black stools  Genitourinary: denies vaginal dryness, pain or burning on urination, blood in urine, cloudy urine, vaginal ulcers  Hematologic: denies anemia, bleeding tendency, blood clots, bleeding gums  Skin: denies easy bruising, hair loss, rash, rash worsened after sun exposure, hives/urticaria, skin thickening, skin tightness, nodules/bumps, color changes of hands or feet in the cold (Raynaud's)  Neurologic: endorses numbness or tingling in hands, numbness or tingling in feet, muscle weakness  Psychiatric: endorses excessive worries, denies depression, PTSD, Bipolar  Sleep: endorses snoring, obstructive sleep apnea on CPAP, daytime somnolence, difficulty falling asleep, difficulty staying asleep, denies poor sleep    PAST MEDICAL HISTORY    She has a past medical history of Anxiety, Essential hypertension, Headache, Musculoskeletal disorder, Scoliosis, and Snoring. FAMILY HISTORY    Her family history includes Cancer in her father, maternal grandfather, and mother; Diabetes in her mother and paternal grandmother; Heart Disease in her paternal grandmother and paternal uncle; Hypertension in her father and mother; Neuropathy in her mother; Stroke in her maternal grandfather and paternal grandmother. SOCIAL HISTORY    She reports that she has never smoked. She has never used smokeless tobacco. She reports current alcohol use. She reports that she does not use drugs. GYNECOLOGIC HISTORY     2, Para 2, Living 2, Miscarriage 0    She denies severe pre-eclampsia, eclampsia or placental insufficiency      HEALTH MAINTENANCE    Immunizations    There is no immunization history on file for this patient.     Age Appropriate Cancer Screening    PAP Smear: 2019  Mammogram: 2019    MEDICATIONS    Current Outpatient Medications   Medication Sig Dispense Refill    multivitamin (ONE A DAY) tablet Take 1 Tab by mouth daily.  TURMERIC PO Take  by mouth daily.  methocarbamoL (ROBAXIN) 500 mg tablet TAKE 1 TABLET BY MOUTH THREE TIMES DAILY AS NEEDED 90 Tab 5    meclizine (ANTIVERT) 25 mg tablet Take 1 Tab by mouth three (3) times daily as needed for Dizziness. 90 Tab 5    lisinopril-hydroCHLOROthiazide (PRINZIDE, ZESTORETIC) 10-12.5 mg per tablet Take 1 Tab by mouth daily. 30 Tab 5    pregabalin (LYRICA) 75 mg capsule Take 1 Cap by mouth two (2) times a day. Max Daily Amount: 150 mg. 60 Cap 5    erenumab-aooe (AIMOVIG AUTOINJECTOR) 140 mg/mL injection 1 mL by SubCUTAneous route every thirty (30) days. 1 Each 5    ketorolac (TORADOL) 10 mg tablet Take 1 Tab by mouth every six (6) hours as needed for Pain. 25 Tab 4    dihydroergotamine (MIGRANAL) 0.5 mg/pump act. (4 mg/mL) nasal spray 1 spray in both nostrils at HA onset. May repeat again in 15 minutes. May repeat X 1 dose. Max 2 doses in 24 hours. 1 Bottle 5    onabotulinumtoxinA (BOTOX) 200 unit injection INJECT 155 UNITS IN NECK AND FACE INTRAMUSCULARLY AT 31 FDA APPROVED SITES EVERY 12 WEEKS FOR CHRONIC MIGRAINE. 1 Vial 0    diazePAM (VALIUM) 5 mg tablet Take 1 Tab by mouth every eight (8) hours as needed (spasm). Max Daily Amount: 15 mg. 15 Tab 0    lidocaine (LIDODERM) 5 % Apply patch to the affected area for 12 hours a day and remove for 12 hours a day. 15 Each 0    DULoxetine (CYMBALTA) 60 mg capsule Take 60 mg by mouth daily.       HYDROcodone-acetaminophen (NORCO) 5-325 mg per tablet Take 1 tablet by mouth BID PRN 60 Tab 0       ALLERGIES    Allergies   Allergen Reactions    Corticosteroids (Glucocorticoids) Anxiety    Morphine Itching and Nausea and Vomiting       PHYSICAL EXAMINATION    Visit Vitals  BP (!) 128/94   Pulse (!) 102   Temp 98.2 °F (36.8 °C)   Resp 18   Ht 5' 5\" (1.651 m) Wt 232 lb (105.2 kg)   BMI 38.61 kg/m²     Body mass index is 38.61 kg/m². General: Patient is alert, oriented x 3, not in acute distress    HEENT:   Conjunctiva are not injected and appear moist, there is no alopecia, neck is supple    Chest:  Breathing comfortably at room air    Neurologic:  Negative Tinel bilaterally    Skin:    Psoriasis:     no  Nail Pitting:     no  Onycholysis:     no  Palmoplantar pustulosis:   no  Splinter Hemorrhage:    no  Leukonychia:     no    Musculoskeletal:  A comprehensive musculoskeletal exam was performed for all joints of each upper and lower extremity and assessed for swelling, tenderness and range of motion. Multiple tender points along the lower back  Bilateral knee crepitus with patellar laxity on right  No synovitis    DATA REVIEW    Prior medical records were reviewed and are summarized as below:    Laboratory data: summarized in the HPI    Imaging: summarized in the HPI. ASSESSMENT AND PLAN    1) Chronic Low Back Pain, Sacroiliac Joint Dysfunction, Coccydynia. She reports having chronic low back pain that has progressively worsened over the years that appears to have started after her second delivery (). She believes that ankylosing spondylitis is the cause of her pain and is seeing me a second opinion since she feels there is a unifying inflammatory process that is causing her issues. Her previous rheumatologist felt her pain was mechanical back pain and not ankylosing spondylitis. Her clinical history is not consistent with inflammatory back pain. She has chronic pain that worsens with activity and worsens with rest. She feels better when she is not bearing weight on her pelvis girdle, such as aqua therapy, when she buyout but feels worse with ambulation. She had not relief with NSAIDs. She had no relief with physical therapy except when she receive manual manipulation but only momentarily.  She is negative for HLA-B27, which is not a diagnostic test. She has had MRI Lumbar spine imaging, MRI pelvis imaging, CT spine and radiograph imaging done since 2016 without evidence of ankylosing spondylitis. In conclusion, I explained that her clinical history and imaging are not consistent with ankylosing spondylitis or sacroiliitis and that her symptoms are non-inflammatory. Unfortunately, she has been doing physical therapy and has worked with physical medicine and rehab who are the clinicians that I would refer to for the management of her complaints. I did recommend weight loss as this may an aggravating her pain when she ambulates. 2) Bilateral Carpal Tunnel Syndrome. I did not appreciate wrist synovitis. Tinel was negative. She was following with neurology regarding this who planned MRI of her cervical spine, so I asked her to follow up with them. The patient voiced understanding of the aforementioned assessment and plan. Summary of plan was provided in the After Visit Summary patient instructions. Today, I personally spent 60 minutes in direct patient care with the patient, of which greater than 50% of the time was spent in patient education, counseling, and coordination of care as described above. All questions asked and answered.     TODAY'S ORDERS    None    Future Appointments   Date Time Provider Christian Cerna   5/26/2020 11:00 AM Leilani Joel, NP [address], [place], [address] [facility]    Adult Rheumatology   Rheumatology Ultrasound Certified  [id number] Replaced by [facility] [address]  [address] [address]   Phone [phone]  Fax [phone]

## 2020-04-28 NOTE — LETTER
4/28/20 Patient: Babita Shepard YOB: 1980 Date of Visit: 4/28/2020 Kirsten Gavin MD 
[facility] [address] [address] VIA Facsimile: [phone] Dear Kirsten Gavin MD, Thank you for referring Ms. Babita Shepard to [address] [place] [address] for evaluation. My notes for this consultation are attached. If you have questions, please do not hesitate to call me. I look forward to following your patient along with you.  
 
 
Sincerely, 
 
Paige Campos MD

## 2020-05-01 DIAGNOSIS — G43.919 INTRACTABLE MIGRAINE WITHOUT STATUS MIGRAINOSUS, UNSPECIFIED MIGRAINE TYPE: ICD-10-CM

## 2020-05-04 RX ORDER — ERENUMAB-AOOE 140 MG/ML
INJECTION, SOLUTION SUBCUTANEOUS
Qty: 1 ML | Refills: 6 | Status: SHIPPED | OUTPATIENT
Start: 2020-05-04 | End: 2020-11-30

## 2020-05-26 ENCOUNTER — VIRTUAL VISIT (OUTPATIENT)
Dept: NEUROLOGY | Age: 40
End: 2020-05-26

## 2020-05-26 VITALS — BODY MASS INDEX: 38.61 KG/M2 | HEIGHT: 65 IN

## 2020-05-26 DIAGNOSIS — G43.919 INTRACTABLE MIGRAINE WITHOUT STATUS MIGRAINOSUS, UNSPECIFIED MIGRAINE TYPE: Primary | ICD-10-CM

## 2020-05-26 DIAGNOSIS — M62.838 NECK MUSCLE SPASM: ICD-10-CM

## 2020-05-26 RX ORDER — DIAZEPAM 5 MG/1
5 TABLET ORAL
Qty: 15 TAB | Refills: 3 | Status: SHIPPED | OUTPATIENT
Start: 2020-05-26

## 2020-05-26 RX ORDER — NORETHINDRONE 0.35 MG
KIT ORAL
COMMUNITY
Start: 2020-04-03 | End: 2022-06-18

## 2020-05-26 RX ORDER — PREGABALIN 50 MG/1
CAPSULE ORAL
COMMUNITY
Start: 2020-05-22

## 2020-05-26 NOTE — PROGRESS NOTES
Pt states she is having less migraines and intensity. Pt states the last week of the month her sypmtons return when her shot is almost due. Pt is still having the neck strain issues. Pt would like valium for this for emergencies. Pt was given this from the ER.

## 2020-05-26 NOTE — PROGRESS NOTES
Luisa Romano is a 41 y.o. female who was seen by synchronous (real-time) audio-video technology on 5/26/2020. Consent: Luisa Romano, who was seen by synchronous (real-time) audio-video technology, and/or her healthcare decision maker, is aware that this patient-initiated, Telehealth encounter on 5/26/2020 is a billable service, with coverage as determined by her insurance carrier. She is aware that she may receive a bill and has provided verbal consent to proceed: Yes. Patient comes in 17[address] [facility] virtual visit. Still having significant neck pain, tightness, and bilateral arm symptoms. She has noticed she is a unable to turn her neck certain ways due to it getting caught and the pain radiating into the right shoulder and arm worse. She has weakness in both arms and hands. Has started to drop things more frequently.      Patient comes in for a follow up for migraines, low back pain, fibromyalgia. Off Botox and on 140 mg Aimovig and this is doing fairly well. She has noticed the last 1-2 weeks the HA get worse. Using different rescues for this.      She no longer has the daily, chronic pressure behind the eyes. She does still get light sensitivity. She has nausea, dizziness and memory fog still. She has to write down more things then normal. She will still sometimes get cluster migraines. Getting PT at [facility] and this is not really helping. Also has been getting dry needling a lot with no results.      Her neck is painful. Especially on the right side and into the right shoulder and arm.   pain with flexion, extension and side to side. She has been using Robaxin TID for spasms, pain and this is helping. She has noticed this can help decrease day to day symptoms. Had multiple cervical MARINA's.      Assessment & Plan:   Diagnoses and all orders for this visit:    1.  Intractable migraine without status migrainosus, unspecified migraine type  -     ubrogepant Sharla Ellison) 100 mg tablet; 1 at HA onset and repeat in 2 hours if needed. Max 2 in 24 hours BIN: [id number] PCN: [id number] GRP:[id number] ID: [id number] PLEASE USE THIS CARD. 2. Neck muscle spasm  -     diazePAM (Valium) 5 mg tablet; Take 1 Tab by mouth every eight (8) hours as needed (spasm). Max Daily Amount: 15 mg. I spent at least 23 minutes on this visit with this established patient. ([id number])    Subjective:   Juan Garcia is a 41 y.o. female who was seen for Follow-up and Migraine      Prior to Admission medications    Medication Sig Start Date End Date Taking? Authorizing Provider   Norlyda 0.35 mg tab TK 1 T PO QD 4/3/20  Yes Provider, Historical   pregabalin (LYRICA) 50 mg capsule TK 1 C PO TID 5/22/20  Yes Provider, Historical   ubrogepant Sherlie Oklaunion) 100 mg tablet 1 at HA onset and repeat in 2 hours if needed. Max 2 in 24 hours BIN: [id number] PCN: [id number] GRP:[id number] ID: [id number] PLEASE USE THIS CARD. 5/26/20  Yes Renny Joel NP   diazePAM (Valium) 5 mg tablet Take 1 Tab by mouth every eight (8) hours as needed (spasm). Max Daily Amount: 15 mg. 5/26/20  Yes Renny Joel NP   Aimovig Autoinjector 140 mg/mL injection ADMINISTER 1 ML UNDER THE SKIN EVERY 30 DAYS 5/4/20  Yes Renny Joel NP   multivitamin (ONE A DAY) tablet Take 1 Tab by mouth daily. Yes Provider, Historical   TURMERIC PO Take  by mouth daily. Yes Provider, Historical   methocarbamoL (ROBAXIN) 500 mg tablet TAKE 1 TABLET BY MOUTH THREE TIMES DAILY AS NEEDED 3/16/20  Yes Renny Joel NP   meclizine (ANTIVERT) 25 mg tablet Take 1 Tab by mouth three (3) times daily as needed for Dizziness. 1/21/20  Yes Renny Joel NP   lisinopril-hydroCHLOROthiazide (PRINZIDE, ZESTORETIC) 10-12.5 mg per tablet Take 1 Tab by mouth daily. 11/25/19  Yes Renny Joel, NP   ketorolac (TORADOL) 10 mg tablet Take 1 Tab by mouth every six (6) hours as needed for Pain.  10/11/19  Yes Renny Joel, MARLYN   dihydroergotamine (MIGRANAL) 0.5 mg/pump act. (4 mg/mL) nasal spray 1 spray in both nostrils at HA onset. May repeat again in 15 minutes. May repeat X 1 dose. Max 2 doses in 24 hours. 9/20/19  Yes Silke Joel NP   lidocaine (LIDODERM) 5 % Apply patch to the affected area for 12 hours a day and remove for 12 hours a day. 5/28/19  Yes VANESSA Marquez   DULoxetine (CYMBALTA) 60 mg capsule Take 60 mg by mouth daily. Yes Provider, Historical   pregabalin (LYRICA) 75 mg capsule Take 1 Cap by mouth two (2) times a day. Max Daily Amount: 150 mg. 10/21/19 5/26/20  Silke Joel NP   onabotulinumtoxinA (BOTOX) 200 unit injection INJECT 155 UNITS IN NECK AND FACE INTRAMUSCULARLY AT 31 FDA APPROVED SITES EVERY 12 WEEKS FOR CHRONIC MIGRAINE. 9/9/19 5/26/20  Sophie Charles MD   diazePAM (VALIUM) 5 mg tablet Take 1 Tab by mouth every eight (8) hours as needed (spasm). Max Daily Amount: 15 mg. 5/28/19 5/26/20  VANESSA Marquez   HYDROcodone-acetaminophen [facility]) 5-325 mg per tablet Take 1 tablet by mouth BID PRN 12/5/18 5/26/20  Silke Joel NP     Allergies   Allergen Reactions    Corticosteroids (Glucocorticoids) Anxiety    Morphine Itching and Nausea and Vomiting       Patient Active Problem List   Diagnosis Code    Severe obesity (BMI 35.0-39. 9) E66.01     Patient Active Problem List    Diagnosis Date Noted    Severe obesity (BMI 35.0-39.9) 05/31/2018     Current Outpatient Medications   Medication Sig Dispense Refill    Norlyda 0.35 mg tab TK 1 T PO QD      pregabalin (LYRICA) 50 mg capsule TK 1 C PO TID      ubrogepant (Ubrelvy) 100 mg tablet 1 at HA onset and repeat in 2 hours if needed. Max 2 in 24 hours BIN: [id number] PCN: [id number] GRP:[id number] ID: [id number] PLEASE USE THIS CARD. 10 Tab 5    diazePAM (Valium) 5 mg tablet Take 1 Tab by mouth every eight (8) hours as needed (spasm).  Max Daily Amount: 15 mg. 15 Tab 3    Aimovig Autoinjector 140 mg/mL injection ADMINISTER 1 ML UNDER THE SKIN EVERY 30 DAYS 1 mL 6    multivitamin (ONE A DAY) tablet Take 1 Tab by mouth daily.  TURMERIC PO Take  by mouth daily.  methocarbamoL (ROBAXIN) 500 mg tablet TAKE 1 TABLET BY MOUTH THREE TIMES DAILY AS NEEDED 90 Tab 5    meclizine (ANTIVERT) 25 mg tablet Take 1 Tab by mouth three (3) times daily as needed for Dizziness. 90 Tab 5    lisinopril-hydroCHLOROthiazide (PRINZIDE, ZESTORETIC) 10-12.5 mg per tablet Take 1 Tab by mouth daily. 30 Tab 5    ketorolac (TORADOL) 10 mg tablet Take 1 Tab by mouth every six (6) hours as needed for Pain. 25 Tab 4    dihydroergotamine (MIGRANAL) 0.5 mg/pump act. (4 mg/mL) nasal spray 1 spray in both nostrils at HA onset. May repeat again in 15 minutes. May repeat X 1 dose. Max 2 doses in 24 hours. 1 Bottle 5    lidocaine (LIDODERM) 5 % Apply patch to the affected area for 12 hours a day and remove for 12 hours a day. 15 Each 0    DULoxetine (CYMBALTA) 60 mg capsule Take 60 mg by mouth daily.        Allergies   Allergen Reactions    Corticosteroids (Glucocorticoids) Anxiety    Morphine Itching and Nausea and Vomiting     Past Medical History:   Diagnosis Date    Anxiety     Essential hypertension     chronic pt takes labetolol    Headache     Musculoskeletal disorder     Scoliosis     Snoring      Past Surgical History:   Procedure Laterality Date     DELIVERY ONLY      HX CARPAL TUNNEL RELEASE Bilateral     HX GYN      HX ORTHOPAEDIC      HX OTHER SURGICAL      left knee surgery     NEUROLOGICAL PROCEDURE UNLISTED      PA CHEMODERVATE FACIAL/TRIGEM/CERV MUSC MIGRAINE  10/24/2018     Family History   Problem Relation Age of Onset    Diabetes Mother     Hypertension Mother     Cancer Mother     Neuropathy Mother     Hypertension Father     Cancer Father     Cancer Maternal Grandfather     Stroke Maternal Grandfather     Diabetes Paternal Grandmother     Stroke Paternal Grandmother     Heart Disease Paternal Grandmother     Heart Disease Paternal Uncle Social History     Tobacco Use    Smoking status: Never Smoker    Smokeless tobacco: Never Used   Substance Use Topics    Alcohol use: Yes     Comment: wine       Review of Systems   Eyes: Positive for blurred vision, double vision and photophobia. Respiratory: Negative for shortness of breath and wheezing. Cardiovascular: Negative for chest pain and palpitations. Gastrointestinal: Positive for nausea. Negative for vomiting. Musculoskeletal: Positive for back pain, joint pain, myalgias and neck pain. Neurological: Positive for dizziness, tingling, sensory change and headaches. Negative for tremors.        Objective:   Vital Signs: (As obtained by patient/caregiver at home)  Visit Vitals   5' 5\" (1.651 m)   BMI 38.61 kg/m²        [INSTRUCTIONS:  \"[x]\" Indicates a positive item  \"[]\" Indicates a negative item  -- DELETE ALL ITEMS NOT EXAMINED]    Constitutional: [x] Appears well-developed and well-nourished [x] No apparent distress      [] Abnormal -     Mental status: [x] Alert and awake  [x] Oriented to person/place/time [x] Able to follow commands    [] Abnormal -     Eyes:   EOM    [x]  Normal    [] Abnormal -   Sclera  [x]  Normal    [] Abnormal -          Discharge [x]  None visible   [] Abnormal -     HENT: [x] Normocephalic, atraumatic  [] Abnormal -   [x] Mouth/Throat: Mucous membranes are moist    External Ears [x] Normal  [] Abnormal -    Neck: [x] No visualized mass [] Abnormal -     Pulmonary/Chest: [x] Respiratory effort normal   [x] No visualized signs of difficulty breathing or respiratory distress        [] Abnormal -      Musculoskeletal:   [x] Normal gait with no signs of ataxia         [x] Normal range of motion of neck        [] Abnormal -     Neurological:        [x] No Facial Asymmetry (Cranial nerve 7 motor function) (limited exam due to video visit)          [x] No gaze palsy        [] Abnormal -          Skin:        [x] No significant exanthematous lesions or discoloration noted on facial skin         [] Abnormal -            Psychiatric:       [x] Normal Affect [] Abnormal -        [x] No Hallucinations    Other pertinent observable physical exam findings:-      RE-institute Valium to help with muscle spasms and intractable migraines. Try Duane Fanny for PRN rescue of migraine at 100 mg PRN. She has failed imitrex, relpax, maxalt, zomig, toradol, fioricet. This will work well as the [place] is working for prevention better right now. she is living with her father now and this has created more stress. Get back in with orthopedics for her limb pains           We discussed the expected course, resolution and complications of the diagnosis(es) in detail. Medication risks, benefits, costs, interactions, and alternatives were discussed as indicated. I advised her to contact the office if her condition worsens, changes or fails to improve as anticipated. She expressed understanding with the diagnosis(es) and plan. Sascha Tovar is a 41 y.o. female who was evaluated by a video visit encounter for concerns as above. Patient identification was verified prior to start of the visit. A caregiver was present when appropriate. Due to this being a TeleHealth encounter (During [facility] [place]-[facility] public health emergency), evaluation of the following organ systems was limited: Vitals/Constitutional/EENT/Resp/CV/GI//MS/Neuro/Skin/Heme-Lymph-Imm. Pursuant to the emergency declaration under the [facility] waiver authority and the [employer] General Act, this Virtual  Visit was conducted, with patient's (and/or legal guardian's) consent, to reduce the patient's risk of exposure to COVID-19 and provide necessary medical care. Services were provided through a video synchronous discussion virtually to substitute for in-person clinic visit. Patient and provider were located at their individual homes.       Refcordell Lin Melina Weathers, NP

## 2020-05-31 ENCOUNTER — HOSPITAL ENCOUNTER (EMERGENCY)
Age: 40
Discharge: HOME | End: 2020-05-31
Attending: STUDENT IN AN ORGANIZED HEALTH CARE EDUCATION/TRAINING PROGRAM
Payer: COMMERCIAL

## 2020-05-31 VITALS
HEART RATE: 90 BPM | RESPIRATION RATE: 16 BRPM | DIASTOLIC BLOOD PRESSURE: 86 MMHG | OXYGEN SATURATION: 100 % | TEMPERATURE: 97.9 F | SYSTOLIC BLOOD PRESSURE: 126 MMHG

## 2020-05-31 DIAGNOSIS — L24.9 IRRITANT DERMATITIS: Primary | ICD-10-CM

## 2020-05-31 PROCEDURE — 90471 IMMUNIZATION ADMIN: CPT

## 2020-05-31 PROCEDURE — 90715 TDAP VACCINE 7 YRS/> IM: CPT | Performed by: STUDENT IN AN ORGANIZED HEALTH CARE EDUCATION/TRAINING PROGRAM

## 2020-05-31 PROCEDURE — 99281 EMR DPT VST MAYX REQ PHY/QHP: CPT

## 2020-05-31 PROCEDURE — 74011250636 HC RX REV CODE- 250/636: Performed by: STUDENT IN AN ORGANIZED HEALTH CARE EDUCATION/TRAINING PROGRAM

## 2020-05-31 RX ORDER — BETAMETHASONE DIPROPIONATE 0.5 MG/G
OINTMENT TOPICAL
Qty: 15 G | Refills: 0 | Status: SHIPPED | OUTPATIENT
Start: 2020-05-31

## 2020-05-31 RX ORDER — CEPHALEXIN 500 MG/1
500 CAPSULE ORAL 3 TIMES DAILY
Qty: 15 CAP | Refills: 0 | Status: SHIPPED | OUTPATIENT
Start: 2020-05-31 | End: 2020-06-05

## 2020-05-31 RX ADMIN — TETANUS TOXOID, REDUCED DIPHTHERIA TOXOID AND ACELLULAR PERTUSSIS VACCINE, ADSORBED 0.5 ML: 5; 2.5; 8; 8; 2.5 SUSPENSION INTRAMUSCULAR at 03:20

## 2020-05-31 NOTE — ED TRIAGE NOTES
States that she has splinters I her fingertips. Reports wiping down cabinets earlier today when she slid her hand under the bottom and causing multiple sticks in her fingers. Redness and warmth noted.

## 2020-05-31 NOTE — ED PROVIDER NOTES
40-year-old right-hand-dominant woman with history of migraine headaches, hypertension, anxiety presenting with finger pain. Patient states that she was cleaning around noon, she was wiping the underside of a cabinet and her right fingertips scraped across the bottom of the cabinet, and contain particle border or similar material.  Noticed some mild pain initially and felt multiple tiny splinters. Over the course of the day her fingertips started hurting gradually more and more. No fevers or chills. Did notice that the tips are reddened. Has not had similar reaction in the past.  States that she was using bleach and ammonia separately to clean today and she is never had a reaction like this. Came to the emergency department that she was concerned that there may be small splinters in her fingers.   Denies any metallic foreign bodies or other contact with sharp substances such as fiberglass            Past Medical History:   Diagnosis Date    Anxiety     Essential hypertension     chronic pt takes labetolol    Headache     Musculoskeletal disorder     Scoliosis     Snoring        Past Surgical History:   Procedure Laterality Date     DELIVERY ONLY      HX CARPAL TUNNEL RELEASE Bilateral     HX GYN      HX ORTHOPAEDIC      HX OTHER SURGICAL      left knee surgery 2007    NEUROLOGICAL PROCEDURE UNLISTED      MO CHEMODERVATE FACIAL/TRIGEM/CERV MUSC MIGRAINE  10/24/2018         Family History:   Problem Relation Age of Onset    Diabetes Mother     Hypertension Mother     Cancer Mother     Neuropathy Mother     Hypertension Father     Cancer Father     Cancer Maternal Grandfather     Stroke Maternal Grandfather     Diabetes Paternal Grandmother     Stroke Paternal Grandmother     Heart Disease Paternal Grandmother     Heart Disease Paternal Uncle        Social History     Socioeconomic History    Marital status:      Spouse name: Not on file    Number of children: Not on file  Years of education: Not on file    Highest education level: Not on file   Occupational History    Not on file   Social Needs    Financial resource strain: Not on file    Food insecurity     Worry: Not on file     Inability: Not on file    Transportation needs     Medical: Not on file     Non-medical: Not on file   Tobacco Use    Smoking status: Never Smoker    Smokeless tobacco: Never Used   Substance and Sexual Activity    Alcohol use: Yes     Comment: wine    Drug use: No    Sexual activity: Yes     Partners: Male   Lifestyle    Physical activity     Days per week: Not on file     Minutes per session: Not on file    Stress: Not on file   Relationships    Social connections     Talks on phone: Not on file     Gets together: Not on file     Attends Cheondoism service: Not on file     Active member of club or organization: Not on file     Attends meetings of clubs or organizations: Not on file     Relationship status: Not on file    Intimate partner violence     Fear of current or ex partner: Not on file     Emotionally abused: Not on file     Physically abused: Not on file     Forced sexual activity: Not on file   Other Topics Concern    Not on file   Social History Narrative    Not on file         ALLERGIES: Corticosteroids (glucocorticoids) and Morphine    Review of Systems   Constitutional: Negative for chills and fever. Eyes: Negative for photophobia. Respiratory: Negative for shortness of breath. Cardiovascular: Negative for chest pain. Gastrointestinal: Negative for abdominal pain. Genitourinary: Negative for dysuria. Musculoskeletal: Negative for back pain. Hand pain   Neurological: Negative for headaches. Psychiatric/Behavioral: Negative for confusion. All other systems reviewed and are negative. Vitals:    05/31/20 0207   BP: 126/86   Pulse: 90   Resp: 16   Temp: 97.9 °F (36.6 °C)   SpO2: 100%              Physical Exam  Vitals signs reviewed. Constitutional:       General: She is not in acute distress. HENT:      Head: Normocephalic and atraumatic. Mouth/Throat:      Mouth: Mucous membranes are moist.      Pharynx: Oropharynx is clear. Cardiovascular:      Rate and Rhythm: Normal rate and regular rhythm. Heart sounds: Normal heart sounds. Pulmonary:      Effort: Pulmonary effort is normal.      Breath sounds: Normal breath sounds. Abdominal:      Tenderness: There is no abdominal tenderness. There is no guarding or rebound. Musculoskeletal: Normal range of motion. Right wrist: Normal.        Arms:       Right hand: Normal sensation noted. Decreased sensation is not present in the ulnar distribution, is not present in the medial distribution and is not present in the radial distribution. Normal strength noted. She exhibits no finger abduction, no thumb/finger opposition and no wrist extension trouble. Skin:     General: Skin is warm and dry. Capillary Refill: Capillary refill takes less than 2 seconds. Neurological:      General: No focal deficit present. Mental Status: She is alert and oriented to person, place, and time. Psychiatric:         Mood and Affect: Mood normal.                  MDM  Number of Diagnoses or Management Options  Irritant dermatitis:   Diagnosis management comments: Fingertip swelling after cleaning the underside of a cabinet, likely contact dermatitis, irritant. May have tiny embedded wood fibers however not palpable and likely not possible to manually extract, patient has been soaking without much improvement. May be similar pathophysiologically to fiberglass dermatitis, will treat therefore with steroids, prophylactic antibiotics, tetanus immunization updated. Given the nature of the contact x-ray likely non-contributory as would not expect the fragments to be radiopaque if in fact they are embedded in the superficial skin layers.   Advise close follow-up with outpatient physician, likely will need referral to dermatology.          Procedures

## 2020-05-31 NOTE — DISCHARGE INSTRUCTIONS
Please call your primary care physician on Monday to schedule a follow-up appointment. You may benefit from evaluation with a dermatologist.    If you develop worsening pain, swelling in the hand, fever, redness streaking up the arm or any new or concerning symptoms please return.

## 2020-06-15 RX ORDER — LISINOPRIL AND HYDROCHLOROTHIAZIDE 10; 12.5 MG/1; MG/1
TABLET ORAL
Qty: 30 TAB | Refills: 5 | Status: SHIPPED | OUTPATIENT
Start: 2020-06-15

## 2020-09-22 RX ORDER — LASMIDITAN 100 MG/1
1 TABLET ORAL
Qty: 8 TAB | Refills: 5 | Status: SHIPPED | OUTPATIENT
Start: 2020-09-22 | End: 2022-06-18

## 2020-09-23 ENCOUNTER — TELEPHONE (OUTPATIENT)
Dept: NEUROLOGY | Age: 40
End: 2020-09-23

## 2020-09-23 NOTE — TELEPHONE ENCOUNTER
Re: Brittany Doyle 100mg approved    Approval rec'd from [address] via [address]    Effective 09/23/20-09/23/21  PA- [id number]    Approval fax sent to [facility].

## 2020-09-23 NOTE — TELEPHONE ENCOUNTER
Pt needs a PA on her Reyvow prescription. Can you please initiate and let me know the outcome. Thanks.

## 2020-11-30 DIAGNOSIS — G43.919 INTRACTABLE MIGRAINE WITHOUT STATUS MIGRAINOSUS, UNSPECIFIED MIGRAINE TYPE: ICD-10-CM

## 2020-11-30 RX ORDER — ERENUMAB-AOOE 140 MG/ML
INJECTION, SOLUTION SUBCUTANEOUS
Qty: 1 ML | Refills: 6 | Status: SHIPPED | OUTPATIENT
Start: 2020-11-30 | End: 2021-06-21

## 2021-02-12 NOTE — TELEPHONE ENCOUNTER
----- Message from Leonides Taylor sent at 12/10/2018  1:02 PM EST -----  Regarding: MARLYN Kahn from [facility], calling regarding  MS. Ramesh faxes  Regarding  A PA for pt, they have been receiving faxes for this pt, but this is the wrong Dept and   location , please look on the back of pt's  insurance card for the correct fax and dept  to sen. Unfortunately they do not have the number, it is not their Dept. All future PA should NOT be faxed to  (f) [phone]  Or  go to this fax number it is not the correct Dept     There is no return call number , her dept does not take incoming calls. I did not see this patient. EKG shows normal sinus rhythm, rate 91, normal axis, normal intervals, no ST segment changes, no T wave inversions.      Molly Young MD  02/12/21 [id number]

## 2021-06-20 DIAGNOSIS — G43.919 INTRACTABLE MIGRAINE WITHOUT STATUS MIGRAINOSUS, UNSPECIFIED MIGRAINE TYPE: ICD-10-CM

## 2021-06-21 RX ORDER — ERENUMAB-AOOE 140 MG/ML
INJECTION, SOLUTION SUBCUTANEOUS
Qty: 1 ML | Refills: 6 | Status: SHIPPED | OUTPATIENT
Start: 2021-06-21 | End: 2022-01-27

## 2021-09-23 ENCOUNTER — PATIENT MESSAGE (OUTPATIENT)
Dept: NEUROLOGY | Age: 41
End: 2021-09-23

## 2021-12-23 ENCOUNTER — HOSPITAL ENCOUNTER (EMERGENCY)
Age: 41
Discharge: HOME | End: 2021-12-23
Attending: EMERGENCY MEDICINE
Payer: COMMERCIAL

## 2021-12-23 ENCOUNTER — APPOINTMENT (OUTPATIENT)
Dept: ULTRASOUND IMAGING | Age: 41
End: 2021-12-23
Attending: NURSE PRACTITIONER
Payer: COMMERCIAL

## 2021-12-23 VITALS
DIASTOLIC BLOOD PRESSURE: 86 MMHG | WEIGHT: 230 LBS | BODY MASS INDEX: 38.32 KG/M2 | TEMPERATURE: 98.4 F | HEIGHT: 65 IN | HEART RATE: 96 BPM | SYSTOLIC BLOOD PRESSURE: 145 MMHG | RESPIRATION RATE: 17 BRPM | OXYGEN SATURATION: 99 %

## 2021-12-23 DIAGNOSIS — N93.9 ABNORMAL UTERINE BLEEDING: Primary | ICD-10-CM

## 2021-12-23 LAB
ALBUMIN SERPL-MCNC: 3.4 G/DL (ref 3.5–5)
ALBUMIN/GLOB SERPL: 0.9 {RATIO} (ref 1.1–2.2)
ALP SERPL-CCNC: 74 U/L (ref 45–117)
ALT SERPL-CCNC: 43 U/L (ref 12–78)
ANION GAP SERPL CALC-SCNC: 4 MMOL/L (ref 5–15)
AST SERPL-CCNC: ABNORMAL U/L (ref 15–37)
BASOPHILS # BLD: 0 K/UL (ref 0–0.1)
BASOPHILS NFR BLD: 0 % (ref 0–1)
BILIRUB SERPL-MCNC: 0.3 MG/DL (ref 0.2–1)
BUN SERPL-MCNC: 9 MG/DL (ref 6–20)
BUN/CREAT SERPL: 14 (ref 12–20)
CALCIUM SERPL-MCNC: 8.8 MG/DL (ref 8.5–10.1)
CHLORIDE SERPL-SCNC: 108 MMOL/L (ref 97–108)
CO2 SERPL-SCNC: 26 MMOL/L (ref 21–32)
CREAT SERPL-MCNC: 0.64 MG/DL (ref 0.55–1.02)
DIFFERENTIAL METHOD BLD: ABNORMAL
EOSINOPHIL # BLD: 0.1 K/UL (ref 0–0.4)
EOSINOPHIL NFR BLD: 1 % (ref 0–7)
ERYTHROCYTE [DISTWIDTH] IN BLOOD BY AUTOMATED COUNT: 14.6 % (ref 11.5–14.5)
GLOBULIN SER CALC-MCNC: 3.8 G/DL (ref 2–4)
GLUCOSE SERPL-MCNC: 104 MG/DL (ref 65–100)
HCG SERPL-ACNC: <1 MIU/ML (ref 0–6)
HCT VFR BLD AUTO: 35.9 % (ref 35–47)
HGB BLD-MCNC: 11.8 G/DL (ref 11.5–16)
IMM GRANULOCYTES # BLD AUTO: 0 K/UL (ref 0–0.04)
IMM GRANULOCYTES NFR BLD AUTO: 0 % (ref 0–0.5)
LYMPHOCYTES # BLD: 2.3 K/UL (ref 0.8–3.5)
LYMPHOCYTES NFR BLD: 37 % (ref 12–49)
MCH RBC QN AUTO: 31.6 PG (ref 26–34)
MCHC RBC AUTO-ENTMCNC: 32.9 G/DL (ref 30–36.5)
MCV RBC AUTO: 96.2 FL (ref 80–99)
MONOCYTES # BLD: 0.4 K/UL (ref 0–1)
MONOCYTES NFR BLD: 7 % (ref 5–13)
NEUTS SEG # BLD: 3.3 K/UL (ref 1.8–8)
NEUTS SEG NFR BLD: 55 % (ref 32–75)
NRBC # BLD: 0 K/UL (ref 0–0.01)
NRBC BLD-RTO: 0 PER 100 WBC
PLATELET # BLD AUTO: 272 K/UL (ref 150–400)
PMV BLD AUTO: 9.7 FL (ref 8.9–12.9)
POTASSIUM SERPL-SCNC: ABNORMAL MMOL/L (ref 3.5–5.1)
PROT SERPL-MCNC: 7.2 G/DL (ref 6.4–8.2)
RBC # BLD AUTO: 3.73 M/UL (ref 3.8–5.2)
SODIUM SERPL-SCNC: 138 MMOL/L (ref 136–145)
WBC # BLD AUTO: 6.2 K/UL (ref 3.6–11)

## 2021-12-23 PROCEDURE — 36415 COLL VENOUS BLD VENIPUNCTURE: CPT

## 2021-12-23 PROCEDURE — 99282 EMERGENCY DEPT VISIT SF MDM: CPT

## 2021-12-23 PROCEDURE — 80053 COMPREHEN METABOLIC PANEL: CPT

## 2021-12-23 PROCEDURE — 74011250637 HC RX REV CODE- 250/637: Performed by: NURSE PRACTITIONER

## 2021-12-23 PROCEDURE — 84702 CHORIONIC GONADOTROPIN TEST: CPT

## 2021-12-23 PROCEDURE — 76830 TRANSVAGINAL US NON-OB: CPT

## 2021-12-23 PROCEDURE — 85025 COMPLETE CBC W/AUTO DIFF WBC: CPT

## 2021-12-23 RX ORDER — ONDANSETRON 4 MG/1
4 TABLET, FILM COATED ORAL
Qty: 15 TABLET | Refills: 0 | Status: SHIPPED | OUTPATIENT
Start: 2021-12-23

## 2021-12-23 RX ORDER — ONDANSETRON 4 MG/1
8 TABLET, ORALLY DISINTEGRATING ORAL
Status: COMPLETED | OUTPATIENT
Start: 2021-12-23 | End: 2021-12-23

## 2021-12-23 RX ORDER — ONDANSETRON 4 MG/1
TABLET, ORALLY DISINTEGRATING ORAL
Status: DISCONTINUED
Start: 2021-12-23 | End: 2021-12-23 | Stop reason: HOSPADM

## 2021-12-23 RX ADMIN — ONDANSETRON 8 MG: 4 TABLET, ORALLY DISINTEGRATING ORAL at 15:47

## 2021-12-23 NOTE — ED PROVIDER NOTES
Nursing Note by Jess Larios NCMA at 02/20/18 01:59 PM     Author:  Jess Larios NCMA Service:  (none) Author Type:  Medical Assistant     Filed:  02/20/18 02:00 PM Encounter Date:  2/20/2018 Status:  Signed     :  Jess Larios NCMA (Medical Assistant)            If provider orders tests at today's visit, patient would like to be contacted via  ([employer] or by telephone).  If to contact patient by phone, patient's preferred phone # is[NK1.1T] [phone][NK1.1M] and it is[NK1.1T] ok[NK1.1M] to leave message on voice mail or with family member.  If medications are ordered at today's visit, the pharmacy name/location patient would like them to be sent to is[NK1.1T] [facility][NK1.2T]  .[NK1.1T]         Revision History        User Key Date/Time User Provider Type Action    > NK1.2 02/20/18 02:00 PM Jess Larios NCMA Medical Assistant Sign     NK1.1 02/20/18 01:59 PM Jess Larios NCMA Medical Assistant     M - Manual, T - Template             This is a 40-year-old female who presents ambulatory to the emergency room with complaints of vaginal bleeding x3 days. Patient also reports feeling lightheaded with mild nausea over the past 24 hours and changing pads every 2 hours throughout the course of the day. Patient denies any pain but does state that she is having pressure. Denies any chest pain, shortness of breath, dizziness, vomiting, fevers or chills. No cough. States no chance of pregnancy. Has not taken any medication prior to arrival for her symptoms. Has not called her OB/GYN. There are no further complaints at this time. Nannette May MD  Past Medical History:  No date: Anxiety  No date: Essential hypertension      Comment:  chronic pt takes labetolol  No date: Headache  No date: Musculoskeletal disorder  No date: Scoliosis  No date: Snoring  Past Surgical History:  No date:  DELIVERY ONLY  No date: HX CARPAL TUNNEL RELEASE;  Bilateral  No date: HX GYN  No date: HX ORTHOPAEDIC  No date: HX OTHER SURGICAL      Comment:  left knee surgery   No date: NEUROLOGICAL PROCEDURE UNLISTED  10/24/2018: [place] CHEMODERVATE FACIAL/TRIGEM/CERV MUSC MIGRAINE             Past Medical History:   Diagnosis Date    Anxiety     Essential hypertension     chronic pt takes labetolol    Headache     Musculoskeletal disorder     Scoliosis     Snoring        Past Surgical History:   Procedure Laterality Date     DELIVERY ONLY      HX CARPAL TUNNEL RELEASE Bilateral     HX GYN      HX ORTHOPAEDIC      HX OTHER SURGICAL      left knee surgery     NEUROLOGICAL PROCEDURE UNLISTED      [place] CHEMODERVATE FACIAL/TRIGEM/CERV MUSC MIGRAINE  10/24/2018         Family History:   Problem Relation Age of Onset    Diabetes Mother     Hypertension Mother     Cancer Mother     Neuropathy Mother     Hypertension Father     Cancer Father     Cancer Maternal Grandfather     Stroke Maternal Grandfather     Diabetes Paternal Grandmother     Stroke Paternal Grandmother     Heart Disease Paternal Grandmother     Heart Disease Paternal Uncle        Social History     Socioeconomic History    Marital status:      Spouse name: Not on file    Number of children: Not on file    Years of education: Not on file    Highest education level: Not on file   Occupational History    Not on file   Tobacco Use    Smoking status: Never Smoker    Smokeless tobacco: Never Used   Substance and Sexual Activity    Alcohol use: Yes     Comment: wine    Drug use: No    Sexual activity: Yes     Partners: Male   Other Topics Concern    Not on file   Social History Narrative    Not on file     Social Determinants of Health     Financial Resource Strain:     Difficulty of Paying Living Expenses: Not on file   Food Insecurity:     Worried About Running Out of Food in the Last Year: Not on file    [place] of Food in the Last Year: Not on file   Transportation Needs:     Lack of Transportation (Medical): Not on file    Lack of Transportation (Non-Medical):  Not on file   Physical Activity:     Days of Exercise per Week: Not on file    Minutes of Exercise per Session: Not on file   Stress:     Feeling of Stress : Not on file   Social Connections:     Frequency of Communication with Friends and Family: Not on file    Frequency of Social Gatherings with Friends and Family: Not on file    Attends Latter-day Services: Not on file    Active Member of [address] [place] [address] or Organizations: Not on file    Attends Club or Organization Meetings: Not on file    Marital Status: Not on file   Intimate Partner Violence:     Fear of Current or Ex-Partner: Not on file    Emotionally Abused: Not on file    Physically Abused: Not on file    Sexually Abused: Not on file   Housing Stability:     Unable to Pay for Housing in the Last Year: Not on file    Number of [place] in the Last Year: Not on file    Unstable Housing in the Last Year: Not on file         ALLERGIES: Corticosteroids (glucocorticoids) and Morphine    Review of Systems   Constitutional: Negative for appetite change, chills, diaphoresis, fatigue and fever. HENT: Negative for congestion, ear discharge, ear pain, sinus pressure, sinus pain, sore throat and trouble swallowing. Eyes: Negative for photophobia, pain, redness and visual disturbance. Respiratory: Negative for chest tightness, shortness of breath and wheezing. Cardiovascular: Negative for chest pain and palpitations. Gastrointestinal: Positive for nausea. Negative for abdominal distention, abdominal pain and vomiting. Endocrine: Negative. Genitourinary: Positive for vaginal bleeding. Negative for difficulty urinating, flank pain, frequency and urgency. Musculoskeletal: Negative for back pain, neck pain and neck stiffness. Skin: Negative for color change, pallor, rash and wound. Allergic/Immunologic: Negative. Neurological: Positive for light-headedness. Negative for dizziness, speech difficulty, weakness and headaches. Hematological: Does not bruise/bleed easily. Psychiatric/Behavioral: Negative for behavioral problems. The patient is not nervous/anxious. Vitals:    12/23/21 1300   BP: (!) 140/75   Pulse: 75   Resp: 18   Temp: 98.2 °F (36.8 °C)   SpO2: 95%   Weight: 104.3 kg (230 lb)   Height: 5' 5\" (1.651 m)            Physical Exam  Vitals and nursing note reviewed. Constitutional:       General: She is not in acute distress. Appearance: Normal appearance. She is well-developed. She is not ill-appearing. HENT:      Head: Normocephalic and atraumatic. Right Ear: External ear normal.      Left Ear: External ear normal.      Nose: Nose normal. No congestion. Mouth/Throat:      Mouth: Mucous membranes are moist.   Eyes:      General:         Right eye: No discharge. Left eye: No discharge. Conjunctiva/sclera: Conjunctivae normal.      Pupils: Pupils are equal, round, and reactive to light. Neck:      Vascular: No JVD. Trachea: No tracheal deviation. Cardiovascular:      Rate and Rhythm: Normal rate and regular rhythm. Pulses: Normal pulses. Heart sounds: Normal heart sounds. No murmur heard. No gallop. Pulmonary:      Effort: Pulmonary effort is normal. No respiratory distress. Breath sounds: Normal breath sounds. No wheezing or rales. Chest:      Chest wall: No tenderness. Abdominal:      General: Bowel sounds are normal. There is no distension. Palpations: Abdomen is soft. Tenderness: There is no abdominal tenderness. There is no guarding or rebound. Genitourinary:     Comments: Negative    Musculoskeletal:         General: No tenderness. Normal range of motion. Cervical back: Normal range of motion and neck supple. Skin:     General: Skin is warm and dry. Capillary Refill: Capillary refill takes less than 2 seconds. Coloration: Skin is not pale. Findings: No erythema or rash. Neurological:      General: No focal deficit present. Mental Status: She is alert and oriented to person, place, and time. Motor: No weakness. Coordination: Coordination normal.   Psychiatric:         Mood and Affect: Mood normal.         Behavior: Behavior normal.         Thought Content: Thought content normal.         Judgment: Judgment normal.          MDM  Number of Diagnoses or Management Options  Abnormal uterine bleeding: new and requires workup  Diagnosis management comments: Differential diagnosis includes pregnancy, urinary fibroids, abnormal uterine bleeding and others. After physical examination and review of imaging and laboratory data, patient was discharged home and will follow up with PCP as well as OB/GYN. Bleeding precautions discussed. Will return to the emergency room with worsening symptoms. Patient in agreement with plan of care.            Amount and/or Complexity of Data Reviewed  Clinical lab tests: ordered and reviewed  Tests in the radiology section of CPT®: ordered and reviewed         Labs Reviewed   CBC WITH AUTOMATED DIFF - Abnormal; Notable for the following components:       Result Value    RBC 3.73 (*)     RDW 14.6 (*)     All other components within normal limits   METABOLIC PANEL, COMPREHENSIVE - Abnormal; Notable for the following components:    Anion gap 4 (*)     Glucose 104 (*)     Albumin 3.4 (*)     A-G Ratio 0.9 (*)     All other components within normal limits   BETA HCG, QT     US TRANSVAGINAL    Result Date: 12/23/2021  Normal transvaginal ultrasound examination. 3:33 PM  Pt has been reexamined. Pt has no new complaints, changes or physical findings. Care plan outlined and precautions discussed. All available results were reviewed with pt. All medications were reviewed with pt. All of pt's questions and concerns were addressed. Pt agrees to F/U as instructed and agrees to return to ED upon further deterioration. Pt is ready to go home. Jeris Holstein, NP    Please note that this dictation was completed with [email or web address], the computer voice recognition software. Quite often unanticipated grammatical, syntax, homophones, and other interpretive errors are inadvertently transcribed by the computer software. Please disregard these errors. Please excuse any errors that have escaped final proofreading. Thank you.     Procedures

## 2021-12-23 NOTE — Clinical Note
[address]  [facility] EMERGENCY DEPT  [address] [phone]  [phone]    Work/School Note    Date: 12/23/2021    To Whom It May concern:    Mathieu Gill was seen and treated today in the emergency room by the following provider(s):  Attending Provider: Khai Moctezuma DO  Nurse Practitioner: Otf Winter NP. Mathieu Gill is excused from work/school on 12/23/2021 through 12/25/2021. She is medically clear to return to work/school on 12/26/2021.          Sincerely,          Sepideh Martines NP

## 2021-12-23 NOTE — ED TRIAGE NOTES
Patient arrives with c/o vaginal bleeding x 3 days. Reports light-headedness, and mild nausea. States bleeding is bright red in color, with clots, having to change pad every 2 hours. Denies any pain, but reports having sudden pelvic/lower abdominal pain 3 days ago, which was followed by onset of bleeding later in the day. Denies chest pain, SOB, cough, fever.

## 2021-12-23 NOTE — Clinical Note
[address]  [facility]  [address] [id number]  [phone]    Work/School Note    Date: 12/23/2021    To Whom It May concern:    Zach Azul was seen and treated today in the emergency room by the following provider(s):  Attending Provider: Maxx Kaur DO  Nurse Practitioner: Leelee Rubio NP. Zach Azul is excused from work/school on 12/23/2021 through 12/25/2021. She is medically clear to return to work/school on 12/26/2021.          Sincerely,          Joaquin Motley NP

## 2022-01-12 NOTE — TELEPHONE ENCOUNTER
Called patient. She states this letter is for her records to summarize everything and what she is to expect in the next 6 months to 1 year. She is currently being treated with botox and aimovig. Does get cervical injections to help with neck strain. She has 1 week per month with severe head pain, nausea and vomitting. Other episodes during the month have gotten better but still nauseous. This letter will be mailed to her. Address on file verified. Hide Biopsy Depth?: No Cryotherapy Text: The wound bed was treated with cryotherapy after the biopsy was performed. Additional Anesthesia Volume In Cc (Will Not Render If 0): 0 Biopsy Method: Dermablade Detail Level: Detailed Curettage Text: The wound bed was treated with curettage after the biopsy was performed. Hemostasis: Drysol Billing Type: Third-Party Bill Depth Of Biopsy: dermis Type Of Destruction Used: Cryotherapy Anesthesia Volume In Cc: 0.5 Consent: Written consent was obtained and risks were reviewed including but not limited to scarring, infection, bleeding, scabbing, incomplete removal, nerve damage and allergy to anesthesia. Silver Nitrate Text: The wound bed was treated with silver nitrate after the biopsy was performed. Electrodesiccation And Curettage Text: The wound bed was treated with electrodesiccation and curettage after the biopsy was performed. Information: Selecting Yes will display possible errors in your note based on the variables you have selected. This validation is only offered as a suggestion for you. PLEASE NOTE THAT THE VALIDATION TEXT WILL BE REMOVED WHEN YOU FINALIZE YOUR NOTE. IF YOU WANT TO FAX A PRELIMINARY NOTE YOU WILL NEED TO TOGGLE THIS TO 'NO' IF YOU DO NOT WANT IT IN YOUR FAXED NOTE. Dressing: bandage Notification Instructions: Patient will be notified of biopsy results. However, patient instructed to call the office if not contacted within 2 weeks. Was A Bandage Applied: Yes Post-Care Instructions: I reviewed with the patient in detail post-care instructions. Patient is to keep the biopsy site dry overnight, and then apply bacitracin twice daily until healed. Patient may apply hydrogen peroxide soaks to remove any crusting. Wound Care: Vaseline Electrodesiccation Text: The wound bed was treated with electrodesiccation after the biopsy was performed. Anesthesia Type: 1% lidocaine with epinephrine and a 1:10 solution of 8.4% sodium bicarbonate Biopsy Type: H and E

## 2022-01-27 DIAGNOSIS — G43.919 INTRACTABLE MIGRAINE WITHOUT STATUS MIGRAINOSUS, UNSPECIFIED MIGRAINE TYPE: ICD-10-CM

## 2022-01-27 RX ORDER — ERENUMAB-AOOE 140 MG/ML
INJECTION, SOLUTION SUBCUTANEOUS
Qty: 1 ML | Refills: 6 | Status: SHIPPED | OUTPATIENT
Start: 2022-01-27 | End: 2022-01-31

## 2022-01-31 DIAGNOSIS — G43.919 INTRACTABLE MIGRAINE WITHOUT STATUS MIGRAINOSUS, UNSPECIFIED MIGRAINE TYPE: ICD-10-CM

## 2022-01-31 RX ORDER — ERENUMAB-AOOE 140 MG/ML
INJECTION, SOLUTION SUBCUTANEOUS
Qty: 1 ML | Refills: 6 | Status: SHIPPED | OUTPATIENT
Start: 2022-01-31 | End: 2022-07-14 | Stop reason: SDUPTHER

## 2022-04-25 ENCOUNTER — TELEPHONE (OUTPATIENT)
Dept: NEUROLOGY | Age: 42
End: 2022-04-25

## 2022-04-25 NOTE — TELEPHONE ENCOUNTER
Pt is requesting update on PA for Aimovig. Pt states she has been waiting 2 weeks for a response. Please call back.

## 2022-05-02 ENCOUNTER — TELEPHONE (OUTPATIENT)
Dept: NEUROLOGY | Age: 42
End: 2022-05-02

## 2022-05-02 NOTE — TELEPHONE ENCOUNTER
Patient is very upset she would like to know about her prescription on AIMOVIG. Patient stated she has been out of her medicine a month.  Please call

## 2022-05-04 ENCOUNTER — TELEPHONE (OUTPATIENT)
Dept: NEUROLOGY | Age: 42
End: 2022-05-04

## 2022-05-04 DIAGNOSIS — G43.919 INTRACTABLE MIGRAINE WITHOUT STATUS MIGRAINOSUS, UNSPECIFIED MIGRAINE TYPE: Primary | ICD-10-CM

## 2022-05-04 RX ORDER — ERENUMAB-AOOE 140 MG/ML
140 INJECTION, SOLUTION SUBCUTANEOUS ONCE
Qty: 1 EACH | Refills: 0 | Status: SHIPPED | COMMUNITY
Start: 2022-05-04 | End: 2022-05-04

## 2022-05-05 NOTE — TELEPHONE ENCOUNTER
Re: Aj MENDEZ request from nurse through teams, created [facility] Key# [id number]    Submitted and awaiting update.

## 2022-05-09 NOTE — TELEPHONE ENCOUNTER
Re; Rain MENDEZ approval effective 5/5/22-5/5/23    [facility]#  [id number]

## 2022-05-16 ENCOUNTER — TELEPHONE (OUTPATIENT)
Dept: NEUROLOGY | Age: 42
End: 2022-05-16

## 2022-06-18 ENCOUNTER — APPOINTMENT (OUTPATIENT)
Dept: GENERAL RADIOLOGY | Age: 42
End: 2022-06-18
Attending: EMERGENCY MEDICINE
Payer: COMMERCIAL

## 2022-06-18 ENCOUNTER — HOSPITAL ENCOUNTER (EMERGENCY)
Age: 42
Discharge: HOME | End: 2022-06-18
Attending: EMERGENCY MEDICINE
Payer: COMMERCIAL

## 2022-06-18 VITALS
BODY MASS INDEX: 38.27 KG/M2 | DIASTOLIC BLOOD PRESSURE: 78 MMHG | TEMPERATURE: 98.2 F | HEART RATE: 81 BPM | RESPIRATION RATE: 16 BRPM | SYSTOLIC BLOOD PRESSURE: 133 MMHG | WEIGHT: 230 LBS | OXYGEN SATURATION: 98 %

## 2022-06-18 DIAGNOSIS — D84.9 IMMUNOSUPPRESSION (HCC): ICD-10-CM

## 2022-06-18 DIAGNOSIS — J20.9 ACUTE BRONCHITIS, UNSPECIFIED ORGANISM: Primary | ICD-10-CM

## 2022-06-18 LAB — SARS-COV-2, COV2: NORMAL

## 2022-06-18 PROCEDURE — 71046 X-RAY EXAM CHEST 2 VIEWS: CPT

## 2022-06-18 PROCEDURE — 99284 EMERGENCY DEPT VISIT MOD MDM: CPT

## 2022-06-18 PROCEDURE — U0005 INFEC AGEN DETEC AMPLI PROBE: HCPCS

## 2022-06-18 RX ORDER — DOXYCYCLINE HYCLATE 100 MG
100 TABLET ORAL 2 TIMES DAILY
Qty: 14 TABLET | Refills: 0 | Status: SHIPPED | OUTPATIENT
Start: 2022-06-18 | End: 2022-06-25

## 2022-06-18 NOTE — ED PROVIDER NOTES
Please note that this dictation was completed with [employer], the computer voice recognition software. Quite often unanticipated grammatical, syntax, homophones, and other interpretive errors are inadvertently transcribed by the computer software. Please disregard these errors. Please excuse any errors that have escaped final proofreading. 61-year-old female with a history of immunosuppression with Enbrel, headache, hypertension, anxiety and others here with cough, congestion and sore throat. Her symptoms began on Monday or approximately 5 days ago. Her daughter has been sick with URI symptoms prior to that. Her daughter tested negative for COVID and the patient is tested negative on Monday as well as Thursday for Jaimie. She has tried Coricidin as well as Flonase without relief. She has postnasal drip. What concerned her this morning was that she coughed up phlegm which had streaks of blood in it twice. She denies any fevers at any point. She has had some chills at night. She takes hydrocodone each day for pain. She has not taken other over-the-counter pain medication. She is vaccinated for COVID x2 and boosted x2. No known sick contacts except for her daughter. No other complaints at this time.           HPI     Past Medical History:   Diagnosis Date    Anxiety     Essential hypertension     chronic pt takes labetolol    Headache     Musculoskeletal disorder     Scoliosis     Snoring        Past Surgical History:   Procedure Laterality Date    HX CARPAL TUNNEL RELEASE Bilateral     HX GYN      HX ORTHOPAEDIC      HX OTHER SURGICAL      left knee surgery     NEUROLOGICAL PROCEDURE UNLISTED      [place] CHEMODERV[place]ACIAL/TRIGEM/CERV MUSC MIGRAINE  10/24/2018         Family History:   Problem Relation Age of Onset    Diabetes Mother     Hypertension Mother     Cancer Mother     Neuropathy Mother     Hypertension Father     Cancer Father     Cancer Maternal Grandfather Stroke Maternal Grandfather     Diabetes Paternal Grandmother     Stroke Paternal Grandmother     Heart Disease Paternal Grandmother     Heart Disease Paternal Uncle        Social History     Socioeconomic History    Marital status:      Spouse name: Not on file    Number of children: Not on file    Years of education: Not on file    Highest education level: Not on file   Occupational History    Not on file   Tobacco Use    Smoking status: Never Smoker    Smokeless tobacco: Never Used   Vaping Use    Vaping Use: Never used   Substance and Sexual Activity    Alcohol use: Yes     Comment: wine    Drug use: No    Sexual activity: Yes     Partners: Male   Other Topics Concern    Not on file   Social History Narrative    Not on file     Social Determinants of Health     Financial Resource Strain:     Difficulty of Paying Living Expenses: Not on file   Food Insecurity:     Worried About [address] in the Last Year: Not on file    [place] of Food in the Last Year: Not on file   Transportation Needs:     Lack of Transportation (Medical): Not on file    Lack of Transportation (Non-Medical):  Not on file   Physical Activity:     Days of Exercise per Week: Not on file    Minutes of Exercise per Session: Not on file   Stress:     Feeling of Stress : Not on file   Social Connections:     Frequency of Communication with Friends and Family: Not on file    Frequency of Social Gatherings with Friends and Family: Not on file    Attends Voodoo Services: Not on file    Active Member of Clubs or Organizations: Not on file    Attends Club or Organization Meetings: Not on file    Marital Status: Not on file   Intimate Partner Violence:     Fear of Current or Ex-Partner: Not on file    Emotionally Abused: Not on file    Physically Abused: Not on file    Sexually Abused: Not on file   Housing Stability:     Unable to Pay for Housing in the Last Year: Not on file    Number of Places Lived in the Last Year: Not on file Unstable Housing in the Last Year: Not on file         ALLERGIES: Corticosteroids (glucocorticoids) and Morphine    Review of Systems   Constitutional:  Negative for chills and fever. HENT:  Positive for congestion and sore throat. Respiratory:  Positive for cough. Negative for shortness of breath. Cardiovascular:  Negative for chest pain. Gastrointestinal:  Negative for diarrhea, nausea and vomiting. All other systems reviewed and are negative. Vitals:    06/18/22 0826   BP: 138/89   Pulse: 92   Resp: 16   Temp: 98.2 °F (36.8 °C)   SpO2: 98%   Weight: 104.3 kg (230 lb)            Physical Exam  Vitals and nursing note reviewed. Constitutional:       Appearance: Normal appearance. She is well-developed. HENT:      Head: Normocephalic and atraumatic. Nose: Congestion and rhinorrhea present. Mouth/Throat:      Mouth: Mucous membranes are moist.   Eyes:      General: No scleral icterus. Right eye: No discharge. Left eye: No discharge. Conjunctiva/sclera: Conjunctivae normal.   Cardiovascular:      Rate and Rhythm: Normal rate and regular rhythm. Heart sounds: Normal heart sounds. No murmur heard. No friction rub. No gallop. Pulmonary:      Effort: Pulmonary effort is normal. No respiratory distress. Breath sounds: Normal breath sounds. No wheezing or rales. Abdominal:      General: There is no distension. Palpations: Abdomen is soft. Tenderness: There is no abdominal tenderness. There is no guarding. Musculoskeletal:         General: No swelling or deformity. Normal range of motion. Cervical back: Normal range of motion and neck supple. No rigidity. Right lower leg: No edema. Left lower leg: No edema. Lymphadenopathy:      Cervical: No cervical adenopathy. Skin:     General: Skin is warm and dry. Coloration: Skin is not jaundiced or pale. Findings: No rash.    Neurological:      Mental Status: She is alert and oriented to person, place, and time. Comments: STACY VILLATORO       78-year-old female immunosuppressed on Enbrel here with productive cough as well as sore throat and congestion. Differential diagnosis includes pneumonia, viral URI, COVID and others. Check chest x-ray and COVID.   Procedures

## 2022-06-18 NOTE — ED TRIAGE NOTES
Patient arrives with c/o cough, nasal congestion, sore throat. Took 2 covid tests at home and both were negative. Patient denies fever but concerned for pneumonia.

## 2022-06-19 LAB
SARS-COV-2, XPLCVT: NOT DETECTED
SOURCE, COVRS: NORMAL

## 2022-07-14 DIAGNOSIS — G43.919 INTRACTABLE MIGRAINE WITHOUT STATUS MIGRAINOSUS, UNSPECIFIED MIGRAINE TYPE: ICD-10-CM

## 2022-07-14 RX ORDER — ERENUMAB-AOOE 140 MG/ML
INJECTION, SOLUTION SUBCUTANEOUS
Qty: 1 ML | Refills: 6 | Status: SHIPPED | OUTPATIENT
Start: 2022-07-14

## 2022-12-05 ENCOUNTER — TELEPHONE (OUTPATIENT)
Dept: NEUROLOGY | Age: 42
End: 2022-12-05

## 2022-12-08 ENCOUNTER — OFFICE VISIT (OUTPATIENT)
Dept: NEUROLOGY | Age: 42
End: 2022-12-08
Payer: COMMERCIAL

## 2022-12-08 VITALS
BODY MASS INDEX: 37.77 KG/M2 | HEART RATE: 74 BPM | RESPIRATION RATE: 13 BRPM | OXYGEN SATURATION: 98 % | DIASTOLIC BLOOD PRESSURE: 78 MMHG | SYSTOLIC BLOOD PRESSURE: 124 MMHG | WEIGHT: 227 LBS

## 2022-12-08 DIAGNOSIS — G43.109 CHRONIC MIGRAINE WITH AURA: Primary | ICD-10-CM

## 2022-12-08 DIAGNOSIS — M79.602 PARESTHESIA AND PAIN OF BOTH UPPER EXTREMITIES: ICD-10-CM

## 2022-12-08 DIAGNOSIS — R20.2 PARESTHESIA OF BOTH LOWER EXTREMITIES: ICD-10-CM

## 2022-12-08 DIAGNOSIS — M79.601 PARESTHESIA AND PAIN OF BOTH UPPER EXTREMITIES: ICD-10-CM

## 2022-12-08 DIAGNOSIS — M62.81 MUSCLE WEAKNESS: ICD-10-CM

## 2022-12-08 DIAGNOSIS — R20.2 PARESTHESIA AND PAIN OF BOTH UPPER EXTREMITIES: ICD-10-CM

## 2022-12-08 RX ORDER — MELOXICAM 15 MG/1
15 TABLET ORAL DAILY
COMMUNITY

## 2022-12-08 RX ORDER — ONABOTULINUMTOXINA 200 [USP'U]/1
INJECTION, POWDER, LYOPHILIZED, FOR SOLUTION INTRADERMAL; INTRAMUSCULAR
Qty: 200 UNITS | Refills: 5 | Status: SHIPPED | OUTPATIENT
Start: 2022-12-08

## 2022-12-08 NOTE — PROGRESS NOTES
Diagnosed with arthritis   Having neck and back pains  Having pains in hands and feet  Hands, Holding objects will make hand go numb, laying on side will make arm numb, symptoms worse at night, certain positions will make hands numb   Feet are having numbness, sharp pains, pins and needles, cold to the touch  Left is worse than the right   Has been diagnosed with carpale tunnel, has had surgery   Neck and back pain still present, sitting causes pains, would like to retouch this  Migraines, are well handled with medications, migraines more present close to next injection and menstrual cycle, photophobic, blue light triggers, migraines are right sided, Tension in the neck,   Would like to get botox for the neck tension

## 2022-12-09 NOTE — PROGRESS NOTES
Flavio Infante is a 38 y.o. female who presents with the following  Chief Complaint   Patient presents with    Follow-up     Migraines, neck and back pain       HPI      Patient comes for migraine, worsening strength, balance, coordination, pains. Was diagnosed with some significant rheumatoid arthritis and is seeing rheumatology and has been getting on and off different types of medications  Currently things are helping but she still has chronic daily pain and worsening paresthesia    Still having significant neck pain, tightness, and bilateral arm symptoms. She has noticed she is a unable to turn her neck certain ways due to it getting caught and the pain radiating into the right shoulder and arm worse. She has weakness in both arms and hands. Has started to drop things more frequently. She has 30 migraines a month lasting all day  She has failed Aimovig, Topamax, Depakote, Cymbalta, Lyrica, propanolol  She was on Botox and doing really well but the PA got messed up  She does still get light sensitivity. She has nausea, dizziness and memory fog still. She has to write down more things then normal.   She will still sometimes get cluster migraines. Her neck is painful. Especially on the right side and into the right shoulder and arm.   pain with flexion, extension and side to side. She had carpal tunnel surgery and has been having a lot more trouble with her arms and her neck though  She is having a lot more trouble with her balance, strength, coordination of the legs  She has not had a recent EMG  She does feel like her legs can give out at times they are numb and painful  Symptoms never get better          Allergies   Allergen Reactions    Corticosteroids (Glucocorticoids) Anxiety    Morphine Itching and Nausea and Vomiting       Current Outpatient Medications   Medication Sig    meloxicam (MOBIC) 15 mg tablet Take 15 mg by mouth daily.     onabotulinumtoxinA (Botox) 200 unit injection Inject 155 units into 31 FDA approved sites in head, face, neck, every 3 months for chronic migraine. erenumab-aooe (Aimovig Autoinjector) 140 mg/mL injection ADMINISTER 1 ML UNDER THE SKIN EVERY 30 DAYS    etanercept (ENBREL SC) 50 mg by SubCUTAneous route. ondansetron hcl (Zofran) 4 mg tablet Take 1 Tablet by mouth every eight (8) hours as needed for Nausea or Vomiting.    lisinopril-hydroCHLOROthiazide (PRINZIDE, ZESTORETIC) 10-12.5 mg per tablet TAKE 1 TABLET BY MOUTH DAILY    betamethasone dipropionate (DIPROLENE) 0.05 % ointment Apply thin layer to the affected areas of the fingers twice daily. pregabalin (LYRICA) 50 mg capsule TK 1 C PO TID    diazePAM (Valium) 5 mg tablet Take 1 Tab by mouth every eight (8) hours as needed (spasm). Max Daily Amount: 15 mg.    multivitamin (ONE A DAY) tablet Take 1 Tab by mouth daily. TURMERIC PO Take  by mouth daily. methocarbamoL (ROBAXIN) 500 mg tablet TAKE 1 TABLET BY MOUTH THREE TIMES DAILY AS NEEDED    meclizine (ANTIVERT) 25 mg tablet Take 1 Tab by mouth three (3) times daily as needed for Dizziness. ketorolac (TORADOL) 10 mg tablet Take 1 Tab by mouth every six (6) hours as needed for Pain.    lidocaine (LIDODERM) 5 % Apply patch to the affected area for 12 hours a day and remove for 12 hours a day. DULoxetine (CYMBALTA) 60 mg capsule Take 60 mg by mouth daily. No current facility-administered medications for this visit.        Social History     Tobacco Use   Smoking Status Never   Smokeless Tobacco Never       Past Medical History:   Diagnosis Date    Anxiety     Essential hypertension     chronic pt takes labetolol    Headache     Musculoskeletal disorder     Scoliosis     Snoring        Past Surgical History:   Procedure Laterality Date    HX CARPAL TUNNEL RELEASE Bilateral     HX GYN      HX ORTHOPAEDIC      HX OTHER SURGICAL      left knee surgery     NEUROLOGICAL PROCEDURE UNLISTED      CA  DELIVERY ONLY      CA CHEMODERVATE FACIAL/TRIGEM/CERV MUSC MIGRAINE  10/24/2018       Family History   Problem Relation Age of Onset    Diabetes Mother     Hypertension Mother     Cancer Mother     Neuropathy Mother     Hypertension Father     Cancer Father     Cancer Maternal Grandfather     Stroke Maternal Grandfather     Diabetes Paternal Grandmother     Stroke Paternal Grandmother     Heart Disease Paternal Grandmother     Heart Disease Paternal Uncle        Social History     Socioeconomic History    Marital status:    Tobacco Use    Smoking status: Never    Smokeless tobacco: Never   Vaping Use    Vaping Use: Never used   Substance and Sexual Activity    Alcohol use: Yes     Comment: wine    Drug use: No    Sexual activity: Yes     Partners: Male       Review of Systems   Constitutional:  Positive for malaise/fatigue. Eyes:  Positive for blurred vision, double vision and photophobia. Respiratory:  Negative for shortness of breath and wheezing. Cardiovascular:  Negative for chest pain and palpitations. Gastrointestinal:  Positive for nausea. Negative for vomiting. Musculoskeletal:  Positive for back pain, joint pain, myalgias and neck pain. Neurological:  Positive for dizziness, tingling, tremors, sensory change, weakness and headaches. Psychiatric/Behavioral:  Positive for memory loss. Remainder of comprehensive review is negative. Physical Exam :    Visit Vitals  /78 (BP 1 Location: Left upper arm, BP Patient Position: Sitting, BP Cuff Size: Large adult)   Pulse 74   Resp 13   Wt 103 kg (227 lb)   SpO2 98%   BMI 37.77 kg/m²       General: Well defined, nourished, and groomed individual in no acute distress. Musculoskeletal: Extremities revealed no edema and had full range of motion of joints. Psych: Good mood and bright affect    NEUROLOGICAL EXAMINATION:    Mental Status: Alert and oriented to person, place, and time    Cranial Nerves:    II, III, IV, VI: Visual acuity grossly intact.  Visual fields are normal.    Pupils are equal, round, and reactive to light and accommodation. Extra-ocular movements are full and fluid. Fundoscopic exam was benign, no ptosis or nystagmus. V-XII: Hearing is grossly intact. Facial features are symmetric, with normal sensation and strength. The palate rises symmetrically and the tongue protrudes midline. Sternocleidomastoids 5/5. Motor Examination: Normal tone, bulk, and strength, 3/5 muscle strength throughout. Coordination: Finger to nose was normal. No resting or intention tremor    Gait and Station: Stooped, shuffled     Reflexes: DTRs 1+ throughout. Neurosensory Exam:  Stocking glove sensory loss to temperature, vibration, and pinprick to the thighs. Results for orders placed or performed during the hospital encounter of 06/18/22   SARS-COV-2   Result Value Ref Range    SARS-CoV-2 by PCR Please find results under separate order     SARS-COV-2   Result Value Ref Range    Specimen source Nasopharyngeal      SARS-CoV-2 Not detected NOTD         Orders Placed This Encounter    MRI BRAIN W WO CONT     Standing Status:   Future     Standing Expiration Date:   1/8/2024     Order Specific Question:   Is Patient Pregnant? Answer:   No     Order Specific Question:   STAT Creatinine as indicated     Answer:   Yes    REFERRAL FOR BOTOX     Referral Priority:   Routine     Referral Type:   Consult, Eval & Treat     Referral Reason:   Specialty Services Required     Referred to Provider:   Selina Glover NP     Number of Visits Requested:   1    EMG LIMITED     Standing Status:   Future     Standing Expiration Date:   6/8/2023     Order Specific Question:   Reason for Exam:     Answer:   both legs    EMG LIMITED     Standing Status:   Future     Standing Expiration Date:   6/8/2023     Order Specific Question:   Reason for Exam:     Answer:   BOTH ARMS    meloxicam (MOBIC) 15 mg tablet     Sig: Take 15 mg by mouth daily.     onabotulinumtoxinA (Botox) 200 unit injection     Sig: Inject 155 units into 31 FDA approved sites in head, face, neck, every 3 months for chronic migraine. Dispense:  200 Units     Refill:  5       1. Chronic migraine with aura    2. Paresthesia and pain of both upper extremities    3. Paresthesia of both lower extremities    4.  Muscle weakness      MRI of the brain to rule out a stroke specifically  Also rule out demyelinating disease as cause  EMG of both legs to look at neuropathy versus radiculopathy versus other  EMG of both arms to look at carpal tunnel post surgery versus radiculopathy at the elbow versus cervical spine  Refer to myself for Botox for chronic migraine as this worked really well in the past but was some issues with the PA  She will keep as needed medications right now for rescue  She is following with rheumatology  She is still having a lot of significant issues with overall movement strength balance coordination  The testing will hopefully help guide us further              This note will not be viewable in [employer]

## 2022-12-13 ENCOUNTER — TELEPHONE (OUTPATIENT)
Dept: NEUROLOGY | Age: 42
End: 2022-12-13

## 2022-12-13 NOTE — TELEPHONE ENCOUNTER
Re: Botox    Rcvd referral in queue    Entered pt into botox 1 portal, awaiting update.  Set reminder

## 2023-01-03 ENCOUNTER — TELEPHONE (OUTPATIENT)
Dept: NEUROLOGY | Age: 43
End: 2023-01-03

## 2023-01-09 NOTE — TELEPHONE ENCOUNTER
Re: Botox    [facility] back, per ref# [id number], PA is not needed for [address] but is needed for . Based on review pt is able to use local [facility]. Sent update to nurse. They advised PA is already on file for  but that auth# they gave is for Aimovig. Created new case in [facility] for  in [facility] - PA Case ID: [id number]    ty PA approval effective 01/09/23-07/08/23    Sent message to nurse to fax script to local [facility] for processing.

## 2023-01-10 RX ORDER — ONABOTULINUMTOXINA 200 [USP'U]/1
INJECTION, POWDER, LYOPHILIZED, FOR SOLUTION INTRADERMAL; INTRAMUSCULAR
Qty: 200 UNITS | Refills: 5 | Status: SHIPPED | OUTPATIENT
Start: 2023-01-10

## 2023-01-17 ENCOUNTER — OFFICE VISIT (OUTPATIENT)
Dept: NEUROLOGY | Age: 43
End: 2023-01-17

## 2023-01-17 DIAGNOSIS — G62.9 NEUROPATHY: Primary | ICD-10-CM

## 2023-01-17 DIAGNOSIS — M54.17 LUMBOSACRAL RADICULOPATHY: ICD-10-CM

## 2023-01-17 NOTE — PROGRESS NOTES
EMG/ NCS Report  [facility]  - [facility]  [facility] [place]  [facility], [address] [place] [address]   [place], [address]   Ph: [phone]   FAX: [phone]/ [phone]  Test Date:  2019      Test Date:  2023    Patient: Cyndi Avila : 1980 Physician: Mary Melvin MD   Sex: Female Height: ' \" Ref PhysDarol Mike   ID#: [id number] Weight:  lbs. Technician: Rula Ramirez     Patient History / Exam:  CC:Paresthesia          EMG & NCV Findings:  Evaluation of the left Fibular motor and the right Fibular motor nerves showed normal distal onset latency (L4.1, R4.0 ms), normal amplitude (L6.5, R6.9 mV), normal conduction velocity (B Fib-Ankle, L42, R44 m/s), and normal conduction velocity (Poplt-B Fib, L67, R67 m/s). The left tibial motor and the right tibial motor nerves showed normal distal onset latency (L5.4, R6.0 ms), normal amplitude (L7.1, R6.3 mV), and normal conduction velocity (Knee-Ankle, L51, R55 m/s). The left Sup Fibular sensory, the right Sup Fibular sensory, the left sural sensory, and the right sural sensory nerves showed normal distal peak latency (L2.8, R2.9, L4.3, R3.6 ms) and normal amplitude (L18.7, R21.4, L5.5, R16.4 µV). All left vs. right side differences were within normal limits. All F Wave latencies were within normal limits. Left vs. Right comparison data for the tibial F wave indicates abnormal L-R latency difference (7.72 ms). All H Reflex left vs. right side latency differences were within normal limits. All examined muscles (as indicated in the following table) showed no evidence of electrical instability.         Impression:        ___________________________  Carry TRES Hartman MD      Nerve Conduction Studies  Anti Sensory Summary Table     Stim Site NR Peak (ms) Norm Peak (ms) P-T Amp (µV) Norm P-T Amp Site1 Site2 Dist (cm)   Left Sup Fibular Anti Sensory (Lat ankle)  31.7 °C   Lower leg    2.8 <4.5 18.7 >5 Lower leg Lat ankle 10.0   Site 2    2.7  29.8       Right Sup Fibular Anti Sensory (Lat ankle)  31.8 °C   Lower leg    2.9 <4.5 21.4 >5 Lower leg Lat ankle 10.0   Site 2    2.8  22.4       Left Sural Anti Sensory (Lat Mall)  31.3 °C   Calf    4.3 <4.5 5.5 >4.0 Calf Lat Mall 14.0   Site 2    4.3  10.0       Site 3    4.3  13.6       Right Sural Anti Sensory (Lat Mall)  31.6 °C   Calf    3.6 <4.5 16.4 >4.0 Calf Lat Mall 14.0   Site 2    3.6  14.8         Motor Summary Table     Stim Site NR Onset (ms) Norm Onset (ms) O-P Amp (mV) Norm O-P Amp Amp (Prev) (%) Site1 Site2 Dist (cm) Marky (m/s) Norm Marky (m/s)   Left Fibular Motor (Ext Dig Brev)  31.8 °C   Ankle    4.1 <6.5 6.5 >2.6 100.0 Ankle Ext Dig Brev 8.0     B Fib    11.2  3.9  60.0 B Fib Ankle 30.0 42 >38   Poplt    12.7  4.5  115.4 Poplt B Fib 10.0 67 >42   Right Fibular Motor (Ext Dig Brev)  31.9 °C   Ankle    4.0 <6.5 6.9 >2.6 100.0 Ankle Ext Dig Brev 8.0     B Fib    11.2  4.8  69.6 B Fib Ankle 32.0 44 >38   Poplt    12.7  4.9  102.1 Poplt B Fib 10.0 67 >42   Left Tibial Motor (Abd Prince Brev)  31.5 °C   Ankle    5.4 <6.1 7.1 >5.3 100.0 Ankle Abd Prince Brev 8.0     Knee    12.5  6.4  90.1 Knee Ankle 36.0 51 >39   Right Tibial Motor (Abd Prince Brev)  31.8 °C   Ankle    6.0 <6.1 6.3 >5.3 100.0 Ankle Abd Prince Brev 8.0     Knee    12.6  7.2  114.3 Knee Ankle 36.0 55 >39     F Wave Studies     NR F-Lat (ms) Lat Norm (ms) L-R F-Lat (ms) L-R Lat Norm   Left Tibial (Mrkrs) (Abd Hallucis)  31.4 °C      47.72 <56 7.72 <5.7   Right Tibial (Mrkrs) (Abd Hallucis)  31.8 °C      40.00 <56 7.72 <5.7     H Reflex Studies     NR H-Lat (ms) L-R H-Lat (ms) L-R Lat Norm   Left Tibial (Gastroc)  31.4 °C      31.28 0.00 <2.0   Right Tibial (Gastroc)  31.8 °C      31.28 0.00 <2.0     EMG     Side Muscle Nerve Root Ins Act Fibs Psw Recrt Duration Amp Poly Comment   Right Ext Dig Brev Dp Br Peron L5, S1 Nml Nml Nml Nml Nml Nml Nml    Right AntTibialis Dp Br Peron L4-5 Nml Nml Nml Nml Nml Nml Nml Right MedGastroc Tibial S1-2 Nml Nml Nml Nml Nml Nml Nml    Right BicepsFemL Sciatic L5-S2 Nml Nml Nml Nml Nml Nml Nml    Right VastusMed Femoral L2-4 Nml Nml Nml Nml Nml Nml Nml    Left Ext Dig Brev Dp Br Peron L5, S1 Nml Nml Nml Nml Nml Nml Nml    Left AntTibialis Dp Br Peron L4-5 Nml Nml Nml Nml Nml Nml Nml    Left MedGastroc Tibial S1-2 Nml Nml Nml Nml Nml Nml Nml    Left VastusMed Femoral L2-4 Nml Nml Nml Nml Nml Nml Nml    Left BicepsFemL Sciatic L5-S2 Nml Nml Nml Nml Nml Nml Nml    Left Mid Lumb Parasp Rami L4,5 Nml Nml Nml Nml Nml Nml Nml    Left Lower Lumb Parasp Rami L5,S1 Nml Nml Nml Nml Nml Nml Nml    Right Mid Lumb Parasp Rami L4,5 Nml Nml Nml Nml Nml Nml Nml                Nerve Conduction Studies  Anti Sensory Left/Right Comparison     Stim Site L Lat (ms) R Lat (ms) L-R Lat (ms) L Amp (µV) R Amp (µV) L-R Amp (%) Site1 Site2 L Marky (m/s) R Marky (m/s) L-R Marky (m/s)   Sup Fibular Anti Sensory (Lat ankle)  31.7 °C   Lower leg 2.2 2.2 0.0 18.7 21.4 12.6 Lower leg Lat ankle 45 45 0   Site 2 2.2 2.3 0.1 29.8 22.4 24.8        Sural Anti Sensory (Lat Mall)  31.3 °C   Calf 3.5 2.9 0.6 5.5 16.4 66.5 Calf Lat Mall 40 48 8   Site 2 3.5 3.0 0.5 10.0 14.8 32.4        Site 3 3.8   13.6            Motor Left/Right Comparison     Stim Site L Lat (ms) R Lat (ms) L-R Lat (ms) L Amp (mV) R Amp (mV) L-R Amp (%) Site1 Site2 L Marky (m/s) R Marky (m/s) L-R Marky (m/s)   Fibular Motor (Ext Dig Brev)  31.8 °C   Ankle 4.1 4.0 0.1 6.5 6.9 5.8 Ankle Ext Dig Brev      B Fib 11.2 11.2 0.0 3.9 4.8 18.8 B Fib Ankle 42 44 2   Poplt 12.7 12.7 0.0 4.5 4.9 8.2 Poplt B Fib 67 67 0   Tibial Motor (Abd Prince Brev)  31.5 °C   Ankle 5.4 6.0 0.6 7.1 6.3 11.3 Ankle Abd Prince Brev      Knee 12.5 12.6 0.1 6.4 7.2 11.1 Knee Ankle 51 55 4         Waveforms:

## 2023-01-17 NOTE — PROGRESS NOTES
This was an elective EMG and nerve conduction of patient's lower extremities including paraspinals. Concerns went to potential radiculopathy and neuropathy considerations. Patient with an exam that which show length dependent sensory changes and reflexes depressed at +1 uniformly. Actively followed by rheumatology at [facility]. EMG and nerve conduction assessment. 1.  Needle insertion and probing was uniformly unrevealing for acute denervation or chronic denervation/reinnervation or myopathic potentials. Patient found some muscle groups more uncomfortable than others but otherwise tolerated well and no compromise of exam quality. Motor unit recruitment as to number morphology and time sequencing appropriate. The study included right and left mid and lower paraspinal groups L4-5 L5-S1.    2.  EMG and nerve conduction assessment. The nerve conduction showed normal motor and sensory results and normal H reflexes and a subtle slower left tibial F-wave latency compared to the right of undetermined- if any significance at all. Impression: This appears to be a normal EMG and nerve conduction assessment. It does not rule out the possibility of small fiber sensory neuropathy for what it is worth. No evidence by this report to strongly suggest radiculopathy at this time. Clinical correlation is advised.   VINOD PLAZA.

## 2023-01-19 ENCOUNTER — TELEPHONE (OUTPATIENT)
Dept: NEUROLOGY | Age: 43
End: 2023-01-19

## 2023-01-24 ENCOUNTER — TELEPHONE (OUTPATIENT)
Dept: NEUROLOGY | Age: 43
End: 2023-01-24

## 2023-02-07 DIAGNOSIS — G43.919 INTRACTABLE MIGRAINE WITHOUT STATUS MIGRAINOSUS, UNSPECIFIED MIGRAINE TYPE: ICD-10-CM

## 2023-02-07 RX ORDER — ERENUMAB-AOOE 140 MG/ML
INJECTION, SOLUTION SUBCUTANEOUS
Qty: 1 ML | Refills: 6 | Status: SHIPPED | OUTPATIENT
Start: 2023-02-07

## 2023-02-14 ENCOUNTER — TELEPHONE (OUTPATIENT)
Dept: NEUROLOGY | Age: 43
End: 2023-02-14

## 2023-02-20 ENCOUNTER — OFFICE VISIT (OUTPATIENT)
Dept: NEUROLOGY | Age: 43
End: 2023-02-20
Payer: COMMERCIAL

## 2023-02-20 DIAGNOSIS — G43.919 INTRACTABLE MIGRAINE WITHOUT STATUS MIGRAINOSUS, UNSPECIFIED MIGRAINE TYPE: Primary | ICD-10-CM

## 2023-02-20 PROCEDURE — 64615 CHEMODENERV MUSC MIGRAINE: CPT | Performed by: NURSE PRACTITIONER

## 2023-02-20 NOTE — PROGRESS NOTES
[facility]  OFFICE PROCEDURE PROGRESS NOTE        Chart reviewed for the following:   I, [de-identified] M MARLYN Joel, have reviewed the History, Physical and updated the Allergic reactions for [place] performed immediately prior to start of procedure:   I, [de-identified] M MARLYN Joel, have performed the following reviews on Chuy Jump prior to the start of the procedure:            * Patient was identified by name and date of birth   * Agreement on procedure being performed was verified  * Risks and Benefits explained to the patient  * Procedure site verified and marked as necessary  * Patient was positioned for comfort  * Consent was signed and verified     Time: 1520      Date of procedure: 2/20/2023    Procedure performed by:  Jane Jeff NP    Provider assisted by: None    Patient assisted by: None    How tolerated by patient: tolerated the procedure well with no complications    Post Procedural Pain Scale: 2 - Hurts Little Bit    Comments: None    Botox Injection Note       Indication: patient has chronic recurrent migraine, has greater than 15 migraine headaches per month, has failed two or more categories of preventatives    Procedure:   Botox concentration: 200 units in 4 ml of preservative-free normal saline. 31 sites injections, distribution as follow      Units/site  Sites Sides Subtotal    Procerus 5 1 1 5    5 1 2 10   Frontalis 5 2 2 20   Temporalis 5 4 2 40   Occipitalis 5 3 2 30   Upper cervical paraspinalis 5 2 2 20   Trapezius 5 3 2 30         200 units Botox were reconstituted, 155 units injected as above and the remainder was unavoidably wasted.      Patient tolerated procedure well.     ________________________  Lee Ann Cantu

## 2023-03-06 ENCOUNTER — OFFICE VISIT (OUTPATIENT)
Dept: NEUROLOGY | Age: 43
End: 2023-03-06
Payer: COMMERCIAL

## 2023-03-06 DIAGNOSIS — G56.03 BILATERAL CARPAL TUNNEL SYNDROME: Primary | ICD-10-CM

## 2023-03-06 PROCEDURE — 95912 NRV CNDJ TEST 11-12 STUDIES: CPT | Performed by: PSYCHIATRY & NEUROLOGY

## 2023-03-06 PROCEDURE — 95886 MUSC TEST DONE W/N TEST COMP: CPT | Performed by: PSYCHIATRY & NEUROLOGY

## 2023-03-06 NOTE — PROCEDURES
EMG/ NCS Report  [facility]  [facility]  [address] [facility], [address] [place] [address] [address], [address]   Ph: [phone]   FAX: [phone]/ [phone]    Test Date:  3/6/2023    Patient: Ashlyn Subramanian : 1980 Physician: Cassius Ganser, M.D. Sex: Female Height: ' \" Ref Viri Daley   ID#: [id number] Weight:  lbs. Technician: Richar Ba     Patient History:    CC: Paresthesia, weakness, pain radiating from neck to hands/arms. EMG & NCV Findings:  Evaluation of the left median motor nerve showed normal distal onset latency (3.9 ms), normal amplitude (6.5 mV), and normal conduction velocity (Elbow-Wrist, 51 m/s). The right median motor nerve showed prolonged distal onset latency (4.8 ms), normal amplitude (5.5 mV), and normal conduction velocity (Elbow-Wrist, 49 m/s). The left ulnar motor nerve showed normal distal onset latency (2.5 ms), normal amplitude (10.6 mV), and normal conduction velocity (A Elbow-B Elbow, 83 m/s). The right ulnar motor nerve showed normal distal onset latency (2.6 ms), normal amplitude (10.7 mV), normal conduction velocity (B Elbow-Wrist, 59 m/s), and normal conduction velocity (A Elbow-B Elbow, 59 m/s). The left median sensory, the left radial sensory, the right radial sensory, the left ulnar sensory, and the right ulnar sensory nerves showed normal distal peak latency (L3.4, L2.0, R1.9, L3.1, R3.1 ms) and normal amplitude (L38.5, L37.3, R29.9, L44.9, R42.1 µV). The right median sensory nerve showed prolonged distal peak latency (4.1 ms) and normal amplitude (31.5 µV). The left median/ulnar (palm) comparison nerve showed normal distal onset latency (Median Palm, 1.7 ms), normal distal peak latency (Median Palm, 2.1 ms), normal amplitude (Median Palm, 30.1 µV), normal distal onset latency (Ulnar Palm, 1.1 ms), normal distal peak latency (Ulnar Palm, 1.6 ms), and normal amplitude (Ulnar Palm, 19.1 µV).   Left vs. Right side comparison data for the ulnar motor nerve indicates abnormal L-R velocity difference (A Elbow-B Elbow, 24 m/s). All remaining left vs. right side differences were within normal limits. All F Wave latencies were within normal limits. All F Wave left vs. right side latency differences were within normal limits. All examined muscles (as indicated in the following table) showed no evidence of electrical instability.         Impression:  No electrodiagnostic evidence of right or left cervical motor radiculopathy  Mild left median neuropathy (i.e. carpal tunnel)  Moderate right median neuropathy (i.e. carpal tunnel)    ___________________________  Amina Chapman M.D.      Nerve Conduction Studies  Anti Sensory Summary Table     Stim Site NR Peak (ms) Norm Peak (ms) P-T Amp (µV) Norm P-T Amp Site1 Site2 Dist (cm)   Left Median Anti Sensory (2nd Digit)  31.1 °C   Wrist    3.4 <4 38.5 >13 Wrist 2nd Digit 14.0   Elbow    3.4  43.6  Elbow Wrist 0.0   Right Median Anti Sensory (2nd Digit)  31.3 °C   Wrist    4.1 <4 31.5 >13 Wrist 2nd Digit 14.0   Elbow    4.0  35.1  Elbow Wrist 0.0   Left Radial Anti Sensory (Base 1st Digit)  32.1 °C   Wrist    2.0 <2.8 37.3 >11 Wrist Base 1st Digit 10.0   Right Radial Anti Sensory (Base 1st Digit)  32 °C   Wrist    1.9 <2.8 29.9 >11 Wrist Base 1st Digit 10.0   Site 2    1.8  40.2       Left Ulnar Anti Sensory (5th Digit)  31.6 °C   Wrist    3.1 <4.0 44.9 >9 Wrist 5th Digit 14.0   B Elbow    3.2  43.0  B Elbow Wrist 0.0   Right Ulnar Anti Sensory (5th Digit)  31.8 °C   Wrist    3.1 <4.0 42.1 >9 Wrist 5th Digit 14.0   B Elbow    3.1  48.1  B Elbow Wrist 0.0     Motor Summary Table     Stim Site NR Onset (ms) Norm Onset (ms) O-P Amp (mV) Norm O-P Amp Amp (Prev) (%) Site1 Site2 Dist (cm) Marky (m/s) Norm Marky (m/s)   Left Median Motor (Abd Poll Brev)  32.3 °C   Wrist    3.9 <4.5 6.5 >4.1 100.0 Wrist Abd Poll Brev 8.0     Elbow    7.8  5.8  89.2 Elbow Wrist 20.0 51 >49   Right Median Motor (Abd Poll Brev)  32.3 °C   Wrist    4.8 <4.5 5.5 >4.1 100.0 Wrist Abd Poll Brev 8.0     Elbow    9.1  4.2  76.4 Elbow Wrist 21.0 49 >49   Left Ulnar Motor (Abd Dig Minimi)  32 °C   Wrist    2.5 <3.1 10.6 >7.0 100.0 Wrist Abd Dig Minimi 8.0     B Elbow    6.5  10.9  102.8 B Elbow Wrist 0.0  >50   A Elbow    7.7  9.4  86.2 A Elbow B Elbow 10.0 83 >50   Right Ulnar Motor (Abd Dig Minimi)  32.4 °C   Wrist    2.6 <3.1 10.7 >7.0 100.0 Wrist Abd Dig Minimi 8.0     B Elbow    6.3  10.0  93.5 B Elbow Wrist 22.0 59 >50   A Elbow    8.0  9.4  94.0 A Elbow B Elbow 10.0 59 >50     Comparison Summary Table     Stim Site NR Peak (ms) P-T Amp (µV) Site1 Site2 Dist (cm) Delta-0 (ms)   Left Median/Ulnar Palm Comparison (Wrist)  32.4 °C   Median Palm    2.1 17.8 Median Palm Ulnar Palm 8.0 0.6   Ulnar Palm    1.6 18.8         F Wave Studies     NR F-Lat (ms) Lat Norm (ms) L-R F-Lat (ms) L-R Lat Norm   Left Ulnar (Mrkrs) (Abd Dig Min)  32 °C      23.92 <32 2.40 <2.5   Right Ulnar (Mrkrs) (Abd Dig Min)  32.5 °C      26.32 <32 2.40 <2.5     EMG     Side Muscle Nerve Root Ins Act Fibs Psw Recrt Duration Amp Poly Comment   Left 1stDorInt Ulnar C8-T1 Nml Nml Nml Nml Nml Nml Nml    Left ExtIndicis Radial (Post Int) C7-8 Nml Nml Nml Nml Nml Nml Nml    Left Triceps Radial C6-7-8 Nml Nml Nml Nml Nml Nml Nml    Left Deltoid Axillary C5-6 Nml Nml Nml Nml Nml Nml Nml    Left Mid Cerv Parasp Rami C5,6 Nml Nml Nml Nml Nml Nml Nml    Left Lower Cerv Parasp Rami C7,T1 Nml Nml Nml Nml Nml Nml Nml    Right Abd Poll Brev Median C8-T1 Nml Nml Nml Nml Nml Nml Nml    Right 1stDorInt Ulnar C8-T1 Nml Nml Nml Nml Nml Nml Nml    Right ExtIndicis Radial (Post Int) C7-8 Nml Nml Nml Nml Nml Nml Nml    Right PronatorTeres Median C6-7 Nml Nml Nml Nml Nml Nml Nml    Right Triceps Radial C6-7-8 Nml Nml Nml Nml Nml Nml Nml    Right Deltoid Axillary C5-6 Nml Nml Nml Nml Nml Nml Nml    Right Mid Cerv Parasp Rami C5,6 Nml Nml Nml Nml Nml Nml Nml    Right Lower Cerv Parasp Rami C7,T1 Nml Nml Nml Nml Nml Nml Nml        Waveforms:

## 2023-04-18 ENCOUNTER — TELEPHONE (OUTPATIENT)
Dept: NEUROLOGY | Age: 43
End: 2023-04-18

## 2023-05-23 ENCOUNTER — TELEPHONE (OUTPATIENT)
Age: 43
End: 2023-05-23

## 2023-05-23 DIAGNOSIS — G43.019 INTRACTABLE MIGRAINE WITHOUT AURA AND WITHOUT STATUS MIGRAINOSUS: Primary | ICD-10-CM

## 2023-05-23 RX ORDER — ERENUMAB-AOOE 140 MG/ML
INJECTION, SOLUTION SUBCUTANEOUS
Qty: 1 ML | Refills: 5 | Status: SHIPPED | OUTPATIENT
Start: 2023-05-23

## 2023-06-21 ENCOUNTER — TELEPHONE (OUTPATIENT)
Age: 43
End: 2023-06-21

## 2023-06-21 NOTE — TELEPHONE ENCOUNTER
RE:[employer]  Key: [id number]  Key: [id number]  Pedro Mancia      [facility] [place] request added and submitted 3 times to insurance that is on file rcvd message via [address] is unable to respond with clinical questions. Please see more information at the bottom of the page for next steps. This request cannot be processed due to the member's coverage has terminated. Resubmit using active member ID information. Patient informed PSR :Do not make meds aren't helping:   PA never asked any questions about medications working. L/M on patients voicemail to provide office with insurance updates.     Nurse notified

## 2023-07-28 ENCOUNTER — TELEPHONE (OUTPATIENT)
Age: 43
End: 2023-07-28

## 2023-08-01 ENCOUNTER — PROCEDURE VISIT (OUTPATIENT)
Age: 43
End: 2023-08-01
Payer: COMMERCIAL

## 2023-08-01 ENCOUNTER — TELEPHONE (OUTPATIENT)
Age: 43
End: 2023-08-01

## 2023-08-01 DIAGNOSIS — G43.919 INTRACTABLE MIGRAINE WITHOUT STATUS MIGRAINOSUS, UNSPECIFIED MIGRAINE TYPE: Primary | ICD-10-CM

## 2023-08-01 PROCEDURE — 64615 CHEMODENERV MUSC MIGRAINE: CPT | Performed by: NURSE PRACTITIONER

## 2023-08-01 RX ORDER — DIAZEPAM 5 MG/1
TABLET ORAL
Qty: 20 TABLET | Refills: 0 | Status: SHIPPED | OUTPATIENT
Start: 2023-08-01 | End: 2023-09-01

## 2023-08-01 RX ORDER — ONDANSETRON 4 MG/1
4 TABLET, FILM COATED ORAL EVERY 8 HOURS PRN
Qty: 30 TABLET | Refills: 5 | Status: SHIPPED | OUTPATIENT
Start: 2023-08-01

## 2023-08-01 NOTE — TELEPHONE ENCOUNTER
CPT 28136- NO PA REQUIRED     Called [facility] provider services    Ref # [id number]

## 2023-08-01 NOTE — PROGRESS NOTES
OFFICE PROCEDURE PROGRESS NOTE        Chart reviewed for the following:   Vladimir MONTES APRN - NP, have reviewed the History, Physical and updated the Allergic reactions for [address] [facility] [address] performed immediately prior to start of procedure:   Vladimir MONTES APRN - NP, have performed the following reviews on [facility] prior to the start of the procedure:            * Patient was identified by name and date of birth   * Agreement on procedure being performed was verified  * Risks and Benefits explained to the patient  * Procedure site verified and marked as necessary  * Patient was positioned for comfort  * Consent was signed and verified     Time: 1220      Date of procedure: 8/1/2023    Procedure performed by:  DENZEL Back NP    Provider assisted by: None    Patient assisted by: None    How tolerated by patient: tolerated the procedure well with no complications    Post Procedural Pain Scale: 2 - Hurts Little Bit    Comments: None          Botox Injection Note       Indication: patient has chronic recurrent migraine, has 7-10 less migraine days per month with botox injections    Procedure:   Botox concentration: 200 units in 4 ml of preservative-free normal saline. 31 sites injections, distribution as follow      Units/site  Sites Sides Subtotal    Procerus 5 1 1 5    5 1 2 10   Frontalis 5 2 2 20   Temporalis 5 4 2 40   Occipitalis 5 3 2 30   Upper cervical paraspinalis 5 2 2 20   Trapezius 5 3 2 30         200 units Botox were reconstituted, 155 units injected as above and the remainder was unavoidably wasted.      Patient tolerated procedure well.     _____________________________   Julisa Schreiber

## 2024-10-05 ENCOUNTER — HOSPITAL ENCOUNTER (EMERGENCY)
Facility: HOSPITAL | Age: 44
Discharge: HOME | End: 2024-10-06
Attending: STUDENT IN AN ORGANIZED HEALTH CARE EDUCATION/TRAINING PROGRAM
Payer: COMMERCIAL

## 2024-10-05 ENCOUNTER — APPOINTMENT (OUTPATIENT)
Facility: HOSPITAL | Age: 44
End: 2024-10-05
Payer: COMMERCIAL

## 2024-10-05 DIAGNOSIS — M62.838 SPASM OF MUSCLE: ICD-10-CM

## 2024-10-05 DIAGNOSIS — M25.552 CHRONIC LEFT HIP PAIN: ICD-10-CM

## 2024-10-05 DIAGNOSIS — G89.29 CHRONIC LEFT HIP PAIN: ICD-10-CM

## 2024-10-05 DIAGNOSIS — G57.02 PIRIFORMIS SYNDROME OF LEFT SIDE: Primary | ICD-10-CM

## 2024-10-05 LAB
ANION GAP SERPL CALC-SCNC: 10 MMOL/L (ref 2–12)
BASOPHILS # BLD: 0 K/UL (ref 0–0.1)
BASOPHILS NFR BLD: 1 % (ref 0–1)
BUN SERPL-MCNC: 10 MG/DL (ref 6–20)
BUN/CREAT SERPL: 15 (ref 12–20)
CALCIUM SERPL-MCNC: 8.9 MG/DL (ref 8.5–10.1)
CHLORIDE SERPL-SCNC: 105 MMOL/L (ref 97–108)
CO2 SERPL-SCNC: 26 MMOL/L (ref 21–32)
CREAT SERPL-MCNC: 0.68 MG/DL (ref 0.55–1.02)
DIFFERENTIAL METHOD BLD: ABNORMAL
EOSINOPHIL # BLD: 0.2 K/UL (ref 0–0.4)
EOSINOPHIL NFR BLD: 3 % (ref 0–7)
ERYTHROCYTE [DISTWIDTH] IN BLOOD BY AUTOMATED COUNT: 14.6 % (ref 11.5–14.5)
GLUCOSE SERPL-MCNC: 96 MG/DL (ref 65–100)
HCT VFR BLD AUTO: 36.2 % (ref 35–47)
HGB BLD-MCNC: 12 G/DL (ref 11.5–16)
IMM GRANULOCYTES # BLD AUTO: 0.1 K/UL (ref 0–0.04)
IMM GRANULOCYTES NFR BLD AUTO: 1 % (ref 0–0.5)
LYMPHOCYTES # BLD: 3.6 K/UL (ref 0.8–3.5)
LYMPHOCYTES NFR BLD: 45 % (ref 12–49)
MCH RBC QN AUTO: 31.7 PG (ref 26–34)
MCHC RBC AUTO-ENTMCNC: 33.1 G/DL (ref 30–36.5)
MCV RBC AUTO: 95.5 FL (ref 80–99)
MONOCYTES # BLD: 0.7 K/UL (ref 0–1)
MONOCYTES NFR BLD: 9 % (ref 5–13)
NEUTS SEG # BLD: 3.2 K/UL (ref 1.8–8)
NEUTS SEG NFR BLD: 41 % (ref 32–75)
NRBC # BLD: 0 K/UL (ref 0–0.01)
NRBC BLD-RTO: 0 PER 100 WBC
PLATELET # BLD AUTO: 242 K/UL (ref 150–400)
PMV BLD AUTO: 10 FL (ref 8.9–12.9)
POTASSIUM SERPL-SCNC: 3.4 MMOL/L (ref 3.5–5.1)
RBC # BLD AUTO: 3.79 M/UL (ref 3.8–5.2)
SODIUM SERPL-SCNC: 141 MMOL/L (ref 136–145)
WBC # BLD AUTO: 7.7 K/UL (ref 3.6–11)

## 2024-10-05 PROCEDURE — 96372 THER/PROPH/DIAG INJ SC/IM: CPT

## 2024-10-05 PROCEDURE — 72193 CT PELVIS W/DYE: CPT

## 2024-10-05 PROCEDURE — 99285 EMERGENCY DEPT VISIT HI MDM: CPT

## 2024-10-05 PROCEDURE — 6360000002 HC RX W HCPCS: Performed by: PHYSICIAN ASSISTANT

## 2024-10-05 PROCEDURE — 6370000000 HC RX 637 (ALT 250 FOR IP): Performed by: STUDENT IN AN ORGANIZED HEALTH CARE EDUCATION/TRAINING PROGRAM

## 2024-10-05 PROCEDURE — 6360000004 HC RX CONTRAST MEDICATION: Performed by: STUDENT IN AN ORGANIZED HEALTH CARE EDUCATION/TRAINING PROGRAM

## 2024-10-05 PROCEDURE — 80048 BASIC METABOLIC PNL TOTAL CA: CPT

## 2024-10-05 PROCEDURE — 36415 COLL VENOUS BLD VENIPUNCTURE: CPT

## 2024-10-05 PROCEDURE — 85025 COMPLETE CBC W/AUTO DIFF WBC: CPT

## 2024-10-05 RX ORDER — OXYCODONE HYDROCHLORIDE 5 MG/1
10 TABLET ORAL
Status: COMPLETED | OUTPATIENT
Start: 2024-10-05 | End: 2024-10-05

## 2024-10-05 RX ORDER — IOPAMIDOL 612 MG/ML
100 INJECTION, SOLUTION INTRAVASCULAR
Status: COMPLETED | OUTPATIENT
Start: 2024-10-05 | End: 2024-10-05

## 2024-10-05 RX ORDER — KETOROLAC TROMETHAMINE 30 MG/ML
30 INJECTION, SOLUTION INTRAMUSCULAR; INTRAVENOUS
Status: COMPLETED | OUTPATIENT
Start: 2024-10-05 | End: 2024-10-05

## 2024-10-05 RX ADMIN — OXYCODONE 10 MG: 5 TABLET ORAL at 22:55

## 2024-10-05 RX ADMIN — KETOROLAC TROMETHAMINE 30 MG: 30 INJECTION, SOLUTION INTRAMUSCULAR at 22:23

## 2024-10-05 RX ADMIN — IOPAMIDOL 100 ML: 612 INJECTION, SOLUTION INTRAVENOUS at 23:53

## 2024-10-05 ASSESSMENT — PAIN DESCRIPTION - PAIN TYPE: TYPE: ACUTE PAIN

## 2024-10-05 ASSESSMENT — PAIN - FUNCTIONAL ASSESSMENT: PAIN_FUNCTIONAL_ASSESSMENT: 0-10

## 2024-10-05 ASSESSMENT — PAIN SCALES - GENERAL: PAINLEVEL_OUTOF10: 10

## 2024-10-05 NOTE — ED TRIAGE NOTES
Pt reports left leg pain that starts in inner, upper leg x5 days. Pt reports pain is deeper and worse close to inner groin area. Pt reports she has been seen by ortho for complaints of left hip pain radiating to left leg ongoing for past year. Pt reports had MRI within past month. Has tried hydrocodone and robaxin at home without relief.

## 2024-10-06 VITALS
BODY MASS INDEX: 36.99 KG/M2 | SYSTOLIC BLOOD PRESSURE: 131 MMHG | HEIGHT: 65 IN | RESPIRATION RATE: 20 BRPM | TEMPERATURE: 98 F | OXYGEN SATURATION: 96 % | HEART RATE: 89 BPM | DIASTOLIC BLOOD PRESSURE: 90 MMHG | WEIGHT: 222 LBS

## 2024-10-06 PROCEDURE — 96374 THER/PROPH/DIAG INJ IV PUSH: CPT

## 2024-10-06 PROCEDURE — 6360000002 HC RX W HCPCS: Performed by: STUDENT IN AN ORGANIZED HEALTH CARE EDUCATION/TRAINING PROGRAM

## 2024-10-06 RX ORDER — LORAZEPAM 1 MG/1
1 TABLET ORAL NIGHTLY PRN
Qty: 12 TABLET | Refills: 0 | Status: SHIPPED | OUTPATIENT
Start: 2024-10-06 | End: 2024-11-05

## 2024-10-06 RX ORDER — METHOCARBAMOL 500 MG/1
500 TABLET, FILM COATED ORAL 3 TIMES DAILY PRN
Qty: 30 TABLET | Refills: 0 | Status: SHIPPED | OUTPATIENT
Start: 2024-10-06 | End: 2024-10-06

## 2024-10-06 RX ORDER — METHOCARBAMOL 500 MG/1
500 TABLET, FILM COATED ORAL 3 TIMES DAILY PRN
Qty: 30 TABLET | Refills: 0 | Status: SHIPPED | OUTPATIENT
Start: 2024-10-06 | End: 2024-11-05

## 2024-10-06 RX ORDER — LORAZEPAM 1 MG/1
1 TABLET ORAL NIGHTLY PRN
Qty: 12 TABLET | Refills: 0 | Status: SHIPPED | OUTPATIENT
Start: 2024-10-06 | End: 2024-10-06

## 2024-10-06 RX ORDER — LORAZEPAM 2 MG/ML
2 INJECTION INTRAMUSCULAR ONCE
Status: COMPLETED | OUTPATIENT
Start: 2024-10-06 | End: 2024-10-06

## 2024-10-06 RX ADMIN — LORAZEPAM 2 MG: 2 INJECTION INTRAMUSCULAR; INTRAVENOUS at 00:49

## 2024-10-06 ASSESSMENT — PAIN DESCRIPTION - ORIENTATION: ORIENTATION: LEFT

## 2024-10-06 ASSESSMENT — PAIN SCALES - GENERAL: PAINLEVEL_OUTOF10: 7

## 2024-10-06 ASSESSMENT — PAIN DESCRIPTION - DESCRIPTORS: DESCRIPTORS: TEARING

## 2024-10-06 ASSESSMENT — PAIN DESCRIPTION - LOCATION: LOCATION: GROIN;HIP

## 2024-10-06 NOTE — DISCHARGE INSTRUCTIONS
You presented the ED with a change in your chronic left hip pain.  Over the last 5 days you had trouble sleeping due to severe pain deep in the tissue.  Based on location concern for possible perirectal abscess or deep tissue infection.  CT scan with contrast was obtained and showed no acute abnormalities in the pelvis.  No signs of infection or abscess.  Multimodal pain treatment was tried with Toradol followed by narcotics without much improvement.  Due to spasm type nature of the pain I trialed Ativan IV that did provide some relief.  I believe your symptoms are most consistent with a piriformis syndrome.  Please reaffirmation piriformis syndrome exercises and follow-up with your physical therapist for appropriate patient management.  If you are having more spasm type pain at night recommend taking the prescribed Ativan.  However do not take the Ativan with narcotic pain medication or methocarbamol.  You may take methocarbamol during the day and this prescription has been refilled for you.

## 2025-06-30 ENCOUNTER — HOSPITAL ENCOUNTER (EMERGENCY)
Facility: HOSPITAL | Age: 45
Discharge: HOME | End: 2025-06-30
Attending: EMERGENCY MEDICINE
Payer: COMMERCIAL

## 2025-06-30 VITALS
BODY MASS INDEX: 39.23 KG/M2 | TEMPERATURE: 97.9 F | HEART RATE: 92 BPM | RESPIRATION RATE: 18 BRPM | SYSTOLIC BLOOD PRESSURE: 101 MMHG | DIASTOLIC BLOOD PRESSURE: 58 MMHG | OXYGEN SATURATION: 96 % | WEIGHT: 235.45 LBS | HEIGHT: 65 IN

## 2025-06-30 DIAGNOSIS — G89.29 CHRONIC LEFT-SIDED LOW BACK PAIN WITH LEFT-SIDED SCIATICA: Primary | ICD-10-CM

## 2025-06-30 DIAGNOSIS — M54.42 CHRONIC LEFT-SIDED LOW BACK PAIN WITH LEFT-SIDED SCIATICA: Primary | ICD-10-CM

## 2025-06-30 PROCEDURE — 6370000000 HC RX 637 (ALT 250 FOR IP): Performed by: EMERGENCY MEDICINE

## 2025-06-30 PROCEDURE — 99284 EMERGENCY DEPT VISIT MOD MDM: CPT

## 2025-06-30 PROCEDURE — 2500000003 HC RX 250 WO HCPCS: Performed by: EMERGENCY MEDICINE

## 2025-06-30 PROCEDURE — 96372 THER/PROPH/DIAG INJ SC/IM: CPT

## 2025-06-30 RX ORDER — PREDNISONE 50 MG/1
50 TABLET ORAL DAILY
Qty: 5 TABLET | Refills: 0 | Status: SHIPPED | OUTPATIENT
Start: 2025-06-30 | End: 2025-07-05

## 2025-06-30 RX ORDER — CYCLOBENZAPRINE HCL 10 MG
5-10 TABLET ORAL 3 TIMES DAILY PRN
Qty: 21 TABLET | Refills: 0 | Status: SHIPPED | OUTPATIENT
Start: 2025-06-30 | End: 2025-07-07

## 2025-06-30 RX ORDER — HYDROMORPHONE HYDROCHLORIDE 1 MG/ML
1 INJECTION, SOLUTION INTRAMUSCULAR; INTRAVENOUS; SUBCUTANEOUS ONCE
Status: COMPLETED | OUTPATIENT
Start: 2025-06-30 | End: 2025-06-30

## 2025-06-30 RX ORDER — CYCLOBENZAPRINE HCL 10 MG
10 TABLET ORAL ONCE
Status: COMPLETED | OUTPATIENT
Start: 2025-06-30 | End: 2025-06-30

## 2025-06-30 RX ADMIN — HYDROMORPHONE HYDROCHLORIDE 1 MG: 1 INJECTION, SOLUTION INTRAMUSCULAR; INTRAVENOUS; SUBCUTANEOUS at 22:31

## 2025-06-30 RX ADMIN — CYCLOBENZAPRINE 10 MG: 10 TABLET, FILM COATED ORAL at 22:31

## 2025-06-30 ASSESSMENT — PAIN DESCRIPTION - PAIN TYPE: TYPE: CHRONIC PAIN

## 2025-06-30 ASSESSMENT — PAIN SCALES - GENERAL: PAINLEVEL_OUTOF10: 8

## 2025-06-30 ASSESSMENT — PAIN - FUNCTIONAL ASSESSMENT
PAIN_FUNCTIONAL_ASSESSMENT: PREVENTS OR INTERFERES SOME ACTIVE ACTIVITIES AND ADLS
PAIN_FUNCTIONAL_ASSESSMENT: 0-10

## 2025-06-30 ASSESSMENT — LIFESTYLE VARIABLES
HOW OFTEN DO YOU HAVE A DRINK CONTAINING ALCOHOL: NEVER
HOW MANY STANDARD DRINKS CONTAINING ALCOHOL DO YOU HAVE ON A TYPICAL DAY: PATIENT DOES NOT DRINK

## 2025-06-30 ASSESSMENT — PAIN DESCRIPTION - ORIENTATION: ORIENTATION: LEFT

## 2025-06-30 ASSESSMENT — PAIN DESCRIPTION - DESCRIPTORS: DESCRIPTORS: PATIENT UNABLE TO DESCRIBE

## 2025-06-30 ASSESSMENT — PAIN DESCRIPTION - LOCATION: LOCATION: BUTTOCKS

## 2025-07-01 NOTE — ED TRIAGE NOTES
Pt ambulatory to triage for L buttock pain since December. PT has hx of bursitis, fibromyalgia, and RA. Pt denies falls/injury/trauma. Pt  has been seeing physical therapy and taking prescribed PRNs with no relief. Pt denies loss of bowel or bladder.

## 2025-07-01 NOTE — ED PROVIDER NOTES
[facility]  EMERGENCY DEPARTMENT ENCOUNTER      Pt Name: Jeancarlos Alex  MRN: [id number]  Birthdate 1980  Date of evaluation: 2025  Provider: Earnest Crocker DO    CHIEF COMPLAINT       Chief Complaint   Patient presents with    Back Pain         HISTORY OF PRESENT ILLNESS   (Location/Symptom, Timing/Onset, Context/Setting, Quality, Duration, Modifying Factors, Severity)  Note limiting factors.   45-year-old female longstanding history of what sounds like sciatic type discomfort comes in with worsening pain.  She has seen multiple physicians in the last several years with MRIs, she has a history of fibromyalgia, takes Norco, Robaxin, has been on steroids, used to get spinal injections.  She comes in with worsening left gluteal discomfort when she stands.  Radiation of pain down the posterior left leg.  No weakness or numbness.  No loss of bowel or bladder.  She denies trauma fevers.  After long discussion with the patient, this is clear that this is a worsening of her underlying chronic type discomfort.    The history is provided by the patient.         Review of External Medical Records:     Nursing Notes were reviewed.    REVIEW OF SYSTEMS    (2-9 systems for level 4, 10 or more for level 5)     Review of Systems    Except as noted above the remainder of the review of systems was reviewed and negative.       PAST MEDICAL HISTORY     Past Medical History:   Diagnosis Date    Anxiety     Essential hypertension     chronic pt takes labetolol    Headache     Musculoskeletal disorder     Scoliosis     Snoring          SURGICAL HISTORY       Past Surgical History:   Procedure Laterality Date    CARPAL TUNNEL RELEASE Bilateral      DELIVERY ONLY      CHEMODERVATE FACIAL/TRIGEM/CERV MUSC MIGRAINE  10/24/2018    GYN      NEUROLOGICAL SURGERY      ORTHOPEDIC SURGERY      OTHER SURGICAL HISTORY      left knee surgery          CURRENT MEDICATIONS       Previous Medications